# Patient Record
Sex: MALE | Race: WHITE | NOT HISPANIC OR LATINO | Employment: FULL TIME | URBAN - METROPOLITAN AREA
[De-identification: names, ages, dates, MRNs, and addresses within clinical notes are randomized per-mention and may not be internally consistent; named-entity substitution may affect disease eponyms.]

---

## 2018-11-06 ENCOUNTER — TELEPHONE (OUTPATIENT)
Dept: PEDIATRICS | Facility: CLINIC | Age: 18
End: 2018-11-06

## 2018-11-06 NOTE — TELEPHONE ENCOUNTER
11/6/2018    Call Regarding ReattributionPhysical    Attempt 1    Message on voicemail     Comments:       Outreach   ZIGGY

## 2018-12-23 ENCOUNTER — OFFICE VISIT (OUTPATIENT)
Dept: URGENT CARE | Facility: URGENT CARE | Age: 18
End: 2018-12-23
Payer: COMMERCIAL

## 2018-12-23 VITALS
BODY MASS INDEX: 32.55 KG/M2 | HEART RATE: 126 BPM | WEIGHT: 240 LBS | SYSTOLIC BLOOD PRESSURE: 135 MMHG | DIASTOLIC BLOOD PRESSURE: 89 MMHG | OXYGEN SATURATION: 98 % | TEMPERATURE: 99 F

## 2018-12-23 DIAGNOSIS — T78.2XXA ACUTE ANAPHYLAXIS, INITIAL ENCOUNTER: Primary | ICD-10-CM

## 2018-12-23 PROCEDURE — 96372 THER/PROPH/DIAG INJ SC/IM: CPT | Performed by: INTERNAL MEDICINE

## 2018-12-23 PROCEDURE — 99213 OFFICE O/P EST LOW 20 MIN: CPT | Performed by: INTERNAL MEDICINE

## 2018-12-23 RX ORDER — EPINEPHRINE 1 MG/ML
0.3 INJECTION, SOLUTION, CONCENTRATE INTRAVENOUS ONCE
Status: COMPLETED | OUTPATIENT
Start: 2018-12-23 | End: 2018-12-23

## 2018-12-23 RX ORDER — DIPHENHYDRAMINE HYDROCHLORIDE 50 MG/ML
50 INJECTION INTRAMUSCULAR; INTRAVENOUS ONCE
Status: COMPLETED | OUTPATIENT
Start: 2018-12-23 | End: 2018-12-23

## 2018-12-23 RX ADMIN — EPINEPHRINE 0.3 MG: 1 INJECTION, SOLUTION, CONCENTRATE INTRAVENOUS at 12:18

## 2018-12-23 RX ADMIN — DIPHENHYDRAMINE HYDROCHLORIDE 50 MG: 50 INJECTION INTRAMUSCULAR; INTRAVENOUS at 12:20

## 2018-12-23 NOTE — PROGRESS NOTES
SUBJECTIVE:  Jamari Cota, a 18 year old male, presents for evaluation of difficulty breathing.  He has a history of dog allergy. This started just this AM.  He describes a feeling of a ball in the throat, making it difficult to breath and swallow.  States that he has been exposed to dogs and cats today.  States that the only thing he had to eat or drink this AM was coffee.  Having numbness in the mouth.    OBJECTIVE:  /89   Pulse 126   Temp 99  F (37.2  C) (Tympanic)   Wt 108.9 kg (240 lb)   SpO2 98%   BMI 32.55 kg/m    GEN: adult male, not able to speak full sentences, shaking  HEENT: edema of the palate and uvula  LUNG: breath sounds are present throughout but diminished; no clear stridor or wheeze  CARDIAC: regular tachycardia, normal S1/S2, no murmur or rub    CLINIC INTERVENTIONS: epinephrine 0.3 mg and diphenhydramine 50 mg given intramuscular.  911 called for emergent ambulance transport to ER.    ASSESSMENT/PLAN:    ICD-10-CM    1. Acute anaphylaxis, initial encounter T78.2XXA EPINEPHrine PF (ADRENALIN) injection 0.3 mg     diphenhydrAMINE (BENADRYL) injection 50 mg       Zeferino Garcia MD

## 2019-04-09 ENCOUNTER — TELEPHONE (OUTPATIENT)
Dept: PEDIATRICS | Facility: CLINIC | Age: 19
End: 2019-04-09

## 2019-04-09 NOTE — TELEPHONE ENCOUNTER
4/9/2019    Call Regarding ReattributionPhysical       Attempt 1    Message on voicemail    Comments:       Outreach   WYATT

## 2019-12-16 ENCOUNTER — HEALTH MAINTENANCE LETTER (OUTPATIENT)
Age: 19
End: 2019-12-16

## 2020-07-08 ENCOUNTER — VIRTUAL VISIT (OUTPATIENT)
Dept: INTERNAL MEDICINE | Facility: CLINIC | Age: 20
End: 2020-07-08
Payer: COMMERCIAL

## 2020-07-08 VITALS — WEIGHT: 230 LBS | BODY MASS INDEX: 31.19 KG/M2

## 2020-07-08 DIAGNOSIS — R05.9 COUGH: Primary | ICD-10-CM

## 2020-07-08 PROCEDURE — 99213 OFFICE O/P EST LOW 20 MIN: CPT | Mod: 95 | Performed by: INTERNAL MEDICINE

## 2020-07-08 RX ORDER — LEVOCETIRIZINE DIHYDROCHLORIDE 5 MG/1
5 TABLET, FILM COATED ORAL
COMMUNITY
End: 2021-10-01

## 2020-07-08 NOTE — PATIENT INSTRUCTIONS
COVID PCR testing ordered - you will be contacted to schedule this.    You should self isolate for at least 7 days from the start of his symptoms; you may discontinue self isolation after 7 days provided you have been fever free for 3 days (without medication) and your respiratory symptoms improved (resolution not necessary).

## 2020-07-08 NOTE — PROGRESS NOTES
"Jamari Cota is a 20 year old male who is being evaluated via a billable video visit.      The patient has been notified of following:     \"This video visit will be conducted via a call between you and your physician/provider. We have found that certain health care needs can be provided without the need for an in-person physical exam.  This service lets us provide the care you need with a video conversation.  If a prescription is necessary we can send it directly to your pharmacy.  If lab work is needed we can place an order for that and you can then stop by our lab to have the test done at a later time.    Video visits are billed at different rates depending on your insurance coverage.  Please reach out to your insurance provider with any questions.    If during the course of the call the physician/provider feels a video visit is not appropriate, you will not be charged for this service.\"    Patient has given verbal consent for Video visit? Yes    How would you like to obtain your AVS? Greg     Patient would like th video invitation sent by: Send to e-mail at: [email or web address]     Will anyone else be joining your video visit? No     VIDEO VISIT                                                      SUBJECTIVE:                                                      HPI: Jamari Cota is a pleasant 20 year old male who requested a video visit to discuss new symptoms:    Symptoms include headache, dry cough, nausea, and fatigue. They have been ongoing for the last 2 days. No known COVID-19 exposures, but COVID-19 is considered widespread at this time and can be asymptomatic.    No fevers or chills. No runny nose or sore throat. No shortness of breath or chest tightness. No diarrhea. No loss of bowel or taste.    No active medical problems.    OBJECTIVE:                                                      General: appears well    ASSESSMENT/PLAN:                                                      (R05) Cough  " (primary encounter diagnosis)  Comment: suspect symptoms represent COVID-19 infection.  Plan: COVID PCR testing ordered - patient be contacted to schedule; patient should self isolate for at least 7 days from the start of his symptoms; patient may discontinue self isolation after 7 days provided he has been fever free for 3 days and his respiratory symptoms improved (resolution not necessary).    Total time of video call between patient and provider was 8 minutes (11:37-11:45am).    (Chart documentation was completed, in part, with [employer] voice-recognition software. Even though reviewed, some grammatical, spelling, and word errors may remain.)    Destiny Prather MD   [address] [address]  T: [phone], F: [phone]

## 2020-07-08 NOTE — LETTER
07/08/20      Jamari Cota  2000  [address]  [facility] [address]        To whom it may concern,    Please excuse Mr. Cota from starting today. He will be needing time to rest and recuperate from an illness that I am seeing him for. He may return to work in 1 week provided he has been fever free x 3 days (without medication) and his respiratory symptoms have improved (though they do not need to resolve prior to him returning to work).     Please contact me with any question or concerns.        Destiny Prather MD   [place] [address] [place] [address]  T: [phone], F: [phone]

## 2021-01-15 ENCOUNTER — HEALTH MAINTENANCE LETTER (OUTPATIENT)
Age: 21
End: 2021-01-15

## 2021-01-16 ENCOUNTER — OFFICE VISIT (OUTPATIENT)
Dept: URGENT CARE | Facility: URGENT CARE | Age: 21
End: 2021-01-16
Payer: COMMERCIAL

## 2021-01-16 VITALS
DIASTOLIC BLOOD PRESSURE: 94 MMHG | HEIGHT: 72 IN | SYSTOLIC BLOOD PRESSURE: 145 MMHG | BODY MASS INDEX: 31.69 KG/M2 | WEIGHT: 234 LBS | HEART RATE: 104 BPM | OXYGEN SATURATION: 98 % | TEMPERATURE: 97.5 F

## 2021-01-16 DIAGNOSIS — L29.9 ITCHING: ICD-10-CM

## 2021-01-16 DIAGNOSIS — L50.9 HIVES: Primary | ICD-10-CM

## 2021-01-16 PROCEDURE — 96372 THER/PROPH/DIAG INJ SC/IM: CPT | Performed by: PHYSICIAN ASSISTANT

## 2021-01-16 PROCEDURE — 99214 OFFICE O/P EST MOD 30 MIN: CPT | Mod: 25 | Performed by: PHYSICIAN ASSISTANT

## 2021-01-16 RX ORDER — METHYLPREDNISOLONE SOD SUCC 125 MG
125 VIAL (EA) INJECTION ONCE
Status: COMPLETED | OUTPATIENT
Start: 2021-01-16 | End: 2021-01-16

## 2021-01-16 RX ORDER — CETIRIZINE HYDROCHLORIDE 10 MG/1
10 TABLET ORAL DAILY
Qty: 20 TABLET | Refills: 0 | Status: SHIPPED | OUTPATIENT
Start: 2021-01-16 | End: 2021-01-16

## 2021-01-16 RX ORDER — PREDNISONE 20 MG/1
20 TABLET ORAL DAILY
Qty: 5 TABLET | Refills: 0 | Status: SHIPPED | OUTPATIENT
Start: 2021-01-16 | End: 2021-03-24

## 2021-01-16 RX ADMIN — Medication 125 MG: at 13:55

## 2021-01-16 ASSESSMENT — MIFFLIN-ST. JEOR: SCORE: 2099.42

## 2021-01-16 NOTE — PATIENT INSTRUCTIONS
Patient Education     Hives (Adult)  Hives are pink or red bumps on the skin. These bumps are also known as wheals. The bumps can itch, burn, or sting. Hives can occur anywhere on the body. They vary in size and shape and can form in clusters. Individual hives can appear and go away quickly. New hives may develop as old ones fade. Hives are common and usually harmless. They are not contagious. Occasionally, hives are a sign of a serious allergy.  Hives are often caused by an allergic reaction. They may occur from:    Certain foods, such as shellfish, nuts, tomatoes, or berries    Contact with something in the environment, such as pollens, animals, or mold    Certain medicines    Sun or cold air    Viral infections, such as a cold, the flu, or strep throat  If the hives continue to come and go over many weeks without any other symptoms (chronic hives), the cause may be very hard to figure out.  You may be prescribed medicines to ease swelling and itching. Follow all instructions when using these medicines. The hives will usually fade in a few days. But they can last for weeks or months.  Home care  Follow these tips:    Try to find the cause of the hives and eliminate it. Discuss possible causes with your healthcare provider. Your healthcare provider may ask you to keep track of the food you eat and your lifestyle to help find the cause of the hives.    Don t scratch the hives. Scratching will delay healing. To reduce itching, apply cool, wet compresses to the skin.    Dress in soft, loose cotton clothing.    Don t bathe in hot water. This can make the itching worse.    Apply an ice pack or cool pack wrapped in a thin towel to your skin. This will help reduce redness and itching. But if your hives were caused by exposure to cold, then do not apply more cold to them.    You may use over-the counter antihistamines to reduce itching. Some older antihistamines, such as diphenhydramine and chlorpheniramine, are  inexpensive. But they need to be taken often and may make you sleepy. They are best used at bedtime. Don t use diphenhydramine if you have glaucoma or have trouble urinating because of an enlarged prostate. Newer antihistamines, such as loratadine, cetirizine, levocetirizine, and fexofenadine, are generally more expensive. But they tend to have fewer side effects. They can be taken less often.    Another type of antihistamine is used to treat heartburn. This type includes ranitidine, nizatidine, famotidine, and cimetidine. These are sometimes used along with the above antihistamines if a single medicine is not working.    If the hives are severe and you do not respond well to other medicines, you may be given a steroid, such as prednisone, to take for a short time. Follow all instructions carefully when taking this medicine. Tell your healthcare provider about any side effects.  Follow-up care  Follow up with your healthcare provider if your symptoms don't get better in 2 days. Ask your provider about allergy testing if you have had a severe reaction or have had several episodes of hives. Allergy testing may help figure out what you are allergic to. You may need blood tests, a urine test, or skin tests.  When to seek medical advice  Call your healthcare provider right away if any of these occur:    Fever of 100.4 F (38.0 C) or higher, or as directed by your healthcare provider    Redness, swelling, or pain    Foul-smelling fluid coming from the rash  Call 911  Call 911 if any of the following occur:    Swelling of the face, throat, or tongue    Trouble breathing or swallowing    Dizziness, weakness, or fainting  [employer] last reviewed this educational content on 6/1/2019 2000-2020 [employer]. [address] [place] [address], [place] [address]. All rights reserved. This information is not intended as a substitute for professional medical care. Always follow your healthcare professional's instructions.

## 2021-01-16 NOTE — PROGRESS NOTES
CHIEF COMPLAINT:   Chief Complaint   Patient presents with     Urgent Care     Pt in clinic c/o severe body itching.     Derm Problem       HPI: Jamari Cota is a 20 year old male who presents to clinic today for evaluation of itching. Patient reports that symptoms started approximately one month ago. He has had intermittent episodes since that time. He usually treats his symptoms with lotions, walking outside etc.   No new medications or products. He denies having fever, chills, tongue / lip swelling, difficulty breathing, wheezing or syncope.     Past Medical History:   Diagnosis Date     Otitis media with effusion     chronic     Varicella     about age 3     Past Surgical History:   Procedure Laterality Date     PE TUBES       SURGICAL HISTORY OF -       arm surgery     Social History     Tobacco Use     Smoking status: Never Smoker     Smokeless tobacco: Never Used     Tobacco comment: non smoking home, mother and step father smoke   Substance Use Topics     Alcohol use: No     Alcohol/week: 0.0 standard drinks     Current Outpatient Medications   Medication     fluticasone (FLONASE) 50 MCG/ACT nasal spray     levocetirizine (XYZAL) 5 MG tablet     Current Facility-Administered Medications   Medication     methylPREDNISolone sodium succinate (solu-MEDROL) injection 125 mg     Allergies   Allergen Reactions     Codeine      Hyper       10 point ROS of systems including Constitutional, Eyes, Respiratory, Cardiovascular, Gastroenterology, Genitourinary, Integumentary, Muscularskeletal, Psychiatric were all negative except for pertinent positives noted in my HPI.        Exam:  BP (!) 145/94   Pulse 104   Temp 97.5  F (36.4  C) (Oral)   Ht 1.829 m (6')   Wt 106.1 kg (234 lb)   SpO2 98%   BMI 31.74 kg/m    Constitutional: healthy, alert and no distress  Head: Normocephalic, atraumatic.  Eyes: conjunctiva clear, no drainage  ENT: TMs clear and shiny marisela, nasal mucosa pink and moist, throat without tonsillar  hypertrophy or erythema. No tongue or lip swelling. No trismus or drooling.   Neck: neck is supple, no cervical lymphadenopathy or nuchal rigidity  Cardiovascular: RRR  Respiratory: CTA bilaterally, no rhonchi or rales  Gastrointestinal: soft and nontender  Skin: multiple pleomorphic, raised, well-defined, blanching patches with wheals and flares.  Neurologic: Speech clear, gait normal. Moves all extremities.      ASSESSMENT/PLAN:  1. Hives  - methylPREDNISolone sodium succinate (solu-MEDROL) injection 125 mg  - predniSONE (DELTASONE) 20 MG tablet; Take 1 tablet (20 mg) by mouth daily  Dispense: 5 tablet; Refill: 0    2. Itching  - predniSONE (DELTASONE) 20 MG tablet; Take 1 tablet (20 mg) by mouth daily  Dispense: 5 tablet; Refill: 0      20 year old male presents to the clinic for evaluation of rash. He continues to have recurring episodes. Rash in appearance is consistent with hives. No evidence of anaphyalxis or severe systemic reaction. He was quite uncomfortable when he arrived in the clinic, and was given 125mg of solumedrol which greatly improved his symptoms.   Will have him take prednisone at home and continue with Xyzal.   Follow-up with PCP if symptoms fail to resolve or continue to recur.     Diagnosis and treatment plan was reviewed with patient and/or family.   We went over any labs or imaging. Discussed worsening symptoms or little to no relief despite treatment plan to follow-up with PCP   Patient verbalizes understanding. All questions were addressed and answered.   Janell Howard PA-C

## 2021-03-24 ENCOUNTER — OFFICE VISIT (OUTPATIENT)
Dept: INTERNAL MEDICINE | Facility: CLINIC | Age: 21
End: 2021-03-24
Payer: COMMERCIAL

## 2021-03-24 VITALS
OXYGEN SATURATION: 98 % | WEIGHT: 265.3 LBS | SYSTOLIC BLOOD PRESSURE: 122 MMHG | HEART RATE: 95 BPM | TEMPERATURE: 97.6 F | BODY MASS INDEX: 35.93 KG/M2 | HEIGHT: 72 IN | DIASTOLIC BLOOD PRESSURE: 88 MMHG

## 2021-03-24 DIAGNOSIS — L50.9 URTICARIA: Primary | ICD-10-CM

## 2021-03-24 DIAGNOSIS — J30.2 SEASONAL ALLERGIC RHINITIS, UNSPECIFIED TRIGGER: ICD-10-CM

## 2021-03-24 PROCEDURE — 99213 OFFICE O/P EST LOW 20 MIN: CPT | Performed by: INTERNAL MEDICINE

## 2021-03-24 RX ORDER — HYDROXYZINE HYDROCHLORIDE 25 MG/1
25 TABLET, FILM COATED ORAL 3 TIMES DAILY PRN
Qty: 90 TABLET | Refills: 0 | Status: SHIPPED | OUTPATIENT
Start: 2021-03-24 | End: 2021-06-19

## 2021-03-24 RX ORDER — MONTELUKAST SODIUM 10 MG/1
10 TABLET ORAL AT BEDTIME
Qty: 90 TABLET | Refills: 0 | Status: SHIPPED | OUTPATIENT
Start: 2021-03-24 | End: 2021-06-19

## 2021-03-24 RX ORDER — FAMOTIDINE 40 MG/1
40 TABLET, FILM COATED ORAL DAILY
Qty: 90 TABLET | Refills: 0 | Status: SHIPPED | OUTPATIENT
Start: 2021-03-24 | End: 2021-06-19

## 2021-03-24 ASSESSMENT — MIFFLIN-ST. JEOR: SCORE: 2236.39

## 2021-03-24 NOTE — PROGRESS NOTES
Assessment & Plan     (L50.9) Urticaria  (primary encounter diagnosis)  Comment:   Urticaria from unknown source.   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 He does not pretreat his living [place] backyard.  No evidence for respiratory compromise at this time.  The symptoms are not bad enough to warrant oral steroids.   Recommended the patient take over the counter non-sedating antihistamines indefinitely.  Will have the patient continue to check to see if there are any new features in their life that may have caused this and eliminate any items that might have caused this (being sure to let us know before they stop any medications).   Due to the extensive and persistent nature of his symptoms, return to allergy clinic for further evaluation. He may need allergy injection therapy.     Add montelukast once daily until further notice.  Add additional histamine blocking, famotidine 40 mg once daily.  Plan: ALLERGY/ASTHMA ADULT REFERRAL, montelukast         (SINGULAIR) 10 MG tablet, hydrOXYzine (ATARAX)         25 MG tablet, famotidine (PEPCID) 40 MG tablet            (J30.2) Seasonal allergic rhinitis, unspecified trigger  Comment: Reviewed seasonal allergies/ environmental allergies/allergic and/or chronic rhintis with the pt. and available treatment options.  Pt understands that the insurance companies have lately desiring to see trial and failure of claritin OTC before covering prescription.  Also disucssed the role of steroid nasal sprays in these types of situations. Discussed the concept of avoidance measures and taking an active role in modifying whatever can be changed in preventing exposures to knwon allergens.  Also discussed the imprtance of reconsdiering ets if they are part of the problem. Also discussed the absolute need for smoking cessation if any tobacco abuse present.  Plan: montelukast (SINGULAIR) 10 MG tablet                        BMI:   Estimated body mass  index is 35.98 kg/m  as calculated from the following:    Height as of this encounter: 1.829 m (6').    Weight as of this encounter: 120.3 kg (265 lb 4.8 oz).           Return in about 3 months (around 6/24/2021) for for recheck, if the symptoms fail to improve.    Toby Beaulieu MD  [facility] MYLES Kauffman is a 21 year old who presents for the following health issues     HPI     Derm Problem  Onset/Duration: on and off x3 months   Description  Location: whole body, worse on head, shoulders and legs   Character: red, itchy and burning   Itching: moderate to severe  Intensity:  moderate  Progression of Symptoms:  same and intermittent  Accompanying signs and symptoms:   Fever: no  Body aches or joint pain: no  Sore throat symptoms: no  Recent cold symptoms: no  History:           Previous episodes of similar rash: None  New exposures:  None  Recent travel: no  Exposure to similar rash: no  Precipitating or alleviating factors:   Therapies tried and outcome: none    Ongoing issues of urticaria over the last few months. Diffuse itching and occasional hives on his upper torso legs arms head.  All of the symptoms occurring despite taking daily Xyzal for his usual environmental allergies.  Had acute episode of urticaria associated severe shortness of breath and tightness of breathing for which she received epinephrine injection at the urgent care. Seen initially in urgent care but then transported to [facility] reason observed overnight.  Seen by allergist in follow-up, allergy testing reviewed intense allergies to birch trees among other things. The allergist recommended daily antihistamine use, with plans to follow-up if he did not improve..    That was the one only episode of severe shortness of breath associated with his recent symptoms.    Many year history of seasonal allergies with congestion rhinorrhea watery eyes.  **I reviewed the information recorded in the  patient's EPIC chart (including but not limited to medical history, surgical history, family history, problem list, medication list, and allergy list) and updated the information as indicated based on the patients reported information.         Review of Systems   Constitutional, HEENT, cardiovascular, pulmonary, gi and gu systems are negative, except as otherwise noted.      Objective    There were no vitals taken for this visit.  There is no height or weight on file to calculate BMI.  Physical Exam   GENERAL alert and no distress  EYES:  Normal sclera,conjunctiva, EOMI  HENT: oral and posterior pharynx without lesions or erythema, facies symmetric  NECK: Neck supple. No LAD, without thyroidmegaly.  RESP: Clear to ausculation bilaterally without wheezes or crackles. Normal BS in all fields.  CV: RRR normal S1S2 without murmurs, rubs or gallops.  LYMPH: no cervical lymph adenopathy appreciated  MS: extremities- no gross deformities of the visible extremities noted,   EXT:  no lower extremity edema  PSYCH: Alert and oriented times 3; speech- coherent  SKIN:  No obvious significant skin lesions on visible portions of face

## 2021-03-24 NOTE — PATIENT INSTRUCTIONS
"*  Urticaria or \"hives\" is a systemic allergic reaction that needs to be \"treated from the inside\".  Avoid any known triggers (if you can find out what they might have been.  Sometime you never find out what triggered the reaction.      *  Take over the counter allergy tablet every day.   Take either Xyzal (levocetirizine), or generic Allegra (fexofenadine), generic Zyrtec (Cetirizine), or generic Claritin (Loratadine) once per day for the next 3-4 weeks.  Sometimes the lingering allergy immune complexes can linger in the body for 2-4 weeks after the exposure.  Insurances do not cover over the counter medications.     *  MONTELUKAST (GENERIC SINGULAIR):  Take Montelukast (generic Singulair) 10 mg once per day every day during your main allergy and/or asthma season(s).  This medication will help reduce the inflammation inside your airways and thereby help reduce your asthma and allergy season.  It is not a \"rescue medication\" and will not get you out of acute troubles, always use your rescue albuterol inhaler if you develop any acute breathing troubles.       *  Also take Famotidine (another histamine blocker)    *  Hydroxyzine 25 or 50 mg 2-4 times per day as needed for itching relief, perhaps more in the evening or bedtime.    Beware of drowsiness after taking this medication, do not drive, use dangerous machinery, or perform dangerous tasks after taking this.      *  Allergy consultation:    --[facility] [phone]   [email or web address]    --[facility] [phone]   [email or web address]    --[facility] [phone]   [email or web address]          SEASONAL ALLERGIES:     *  Take one of the following over the counter non-sedating anti-histamines allergy tablet once per day, every day for the next 4-8 weeks during the duration of the allergy season.      --generic Allegra " "(fexofenidine)  OR  --generic Claritin (loratidine)     OR  --generic Zyrtec (cetirizine)      *  IF YOU HAVE A LOT OF EYE IRRITATION FROM ALLERGIES:Use allergy eye drops IF NEEDED for allergic conjunctivitis     --Patanol (or similar over the counter product):  1 drop into the affected eye twice per day as needed during the main allergy season(s)     --if this prescription eye drop is not covered by your insurance, then have the pharmacist direct you to a similar over the counter version.          *  IF YOU HAVE A LOT OF NASAL OR SINUS CONGESTION:  Use steroid nasal spray (available over the counter), Use 2 sprays into each nostril once per day, every day regardless of how you feel for the next 4-8 weeks, depending on the length of the allergy season.  This type of steroid nasal spray will take at least 10 days to reach full effect, please use it for at least 3 full weeks before deciding if it doesn't work for you.       --typical brands include Flonase (fluticasone) or Nasonex (mometasone) or Nasocort (triamcinolone)    Examples of steroid nasal spray:  (cheapest prices are from [employer] or [employer])             * Beware of decongestants or medications preparations that have a \"D\", these contain pseudoephedrine or phenylephrine which may raise your blood pressure. If your blood pressure is well controlled and you are not on multiple medications, then you may be able to take small amounts of decongestant without a large chance of side effects, but please monitor your blood pressure and if >140/90 while on the decongestants, then stop those products.     *  If you cannot tolerate decongestants or have been told not to take decongestants, you can use chlortrimeton (chlorpheniramine) if you react poorly to decongestants.  Always try and use the lowest dose needed.  If you have a low of overnight or early morning allergy symptoms, then try taking you favorite nighttime multi symptom cold reliever medication (such as " Nyquil, Vicks Formula 44, etc.)

## 2021-05-04 ENCOUNTER — IMMUNIZATION (OUTPATIENT)
Dept: NURSING | Facility: CLINIC | Age: 21
End: 2021-05-04
Payer: COMMERCIAL

## 2021-05-04 PROCEDURE — 91300 PR COVID VAC PFIZER DIL RECON 30 MCG/0.3 ML IM: CPT

## 2021-05-04 PROCEDURE — 0001A PR COVID VAC PFIZER DIL RECON 30 MCG/0.3 ML IM: CPT

## 2021-05-25 ENCOUNTER — IMMUNIZATION (OUTPATIENT)
Dept: NURSING | Facility: CLINIC | Age: 21
End: 2021-05-25
Attending: INTERNAL MEDICINE
Payer: COMMERCIAL

## 2021-05-25 PROCEDURE — 91300 PR COVID VAC PFIZER DIL RECON 30 MCG/0.3 ML IM: CPT

## 2021-05-25 PROCEDURE — 0002A PR COVID VAC PFIZER DIL RECON 30 MCG/0.3 ML IM: CPT

## 2021-06-19 ENCOUNTER — MYC REFILL (OUTPATIENT)
Dept: INTERNAL MEDICINE | Facility: CLINIC | Age: 21
End: 2021-06-19

## 2021-06-19 DIAGNOSIS — L50.9 URTICARIA: ICD-10-CM

## 2021-06-19 DIAGNOSIS — J30.2 SEASONAL ALLERGIC RHINITIS, UNSPECIFIED TRIGGER: ICD-10-CM

## 2021-06-21 RX ORDER — HYDROXYZINE HYDROCHLORIDE 25 MG/1
25 TABLET, FILM COATED ORAL 3 TIMES DAILY PRN
Qty: 90 TABLET | Refills: 2 | Status: SHIPPED | OUTPATIENT
Start: 2021-06-21

## 2021-06-21 RX ORDER — FAMOTIDINE 40 MG/1
40 TABLET, FILM COATED ORAL DAILY
Qty: 90 TABLET | Refills: 2 | Status: SHIPPED | OUTPATIENT
Start: 2021-06-21 | End: 2021-10-01

## 2021-06-21 RX ORDER — MONTELUKAST SODIUM 10 MG/1
10 TABLET ORAL AT BEDTIME
Qty: 90 TABLET | Refills: 2 | Status: SHIPPED | OUTPATIENT
Start: 2021-06-21 | End: 2021-10-01

## 2021-09-04 ENCOUNTER — HEALTH MAINTENANCE LETTER (OUTPATIENT)
Age: 21
End: 2021-09-04

## 2021-09-28 ENCOUNTER — HOSPITAL ENCOUNTER (EMERGENCY)
Facility: CLINIC | Age: 21
Discharge: HOME | End: 2021-09-28
Attending: EMERGENCY MEDICINE | Admitting: EMERGENCY MEDICINE
Payer: COMMERCIAL

## 2021-09-28 ENCOUNTER — APPOINTMENT (OUTPATIENT)
Dept: CT IMAGING | Facility: CLINIC | Age: 21
End: 2021-09-28
Attending: EMERGENCY MEDICINE
Payer: COMMERCIAL

## 2021-09-28 VITALS
SYSTOLIC BLOOD PRESSURE: 127 MMHG | TEMPERATURE: 98 F | DIASTOLIC BLOOD PRESSURE: 93 MMHG | RESPIRATION RATE: 16 BRPM | HEIGHT: 72 IN | WEIGHT: 270 LBS | HEART RATE: 89 BPM | OXYGEN SATURATION: 98 % | BODY MASS INDEX: 36.57 KG/M2

## 2021-09-28 DIAGNOSIS — R79.89 ELEVATED LIVER FUNCTION TESTS: ICD-10-CM

## 2021-09-28 DIAGNOSIS — R10.9 ABDOMINAL WALL PAIN: ICD-10-CM

## 2021-09-28 LAB
ALBUMIN SERPL-MCNC: 4.7 G/DL (ref 3.4–5)
ALP SERPL-CCNC: 67 U/L (ref 40–150)
ALT SERPL W P-5'-P-CCNC: 236 U/L (ref 0–70)
ANION GAP SERPL CALCULATED.3IONS-SCNC: 8 MMOL/L (ref 3–14)
AST SERPL W P-5'-P-CCNC: 78 U/L (ref 0–45)
BASOPHILS # BLD AUTO: 0.1 10E3/UL (ref 0–0.2)
BASOPHILS NFR BLD AUTO: 1 %
BILIRUB SERPL-MCNC: 1.9 MG/DL (ref 0.2–1.3)
BUN SERPL-MCNC: 15 MG/DL (ref 7–30)
CALCIUM SERPL-MCNC: 9.2 MG/DL (ref 8.5–10.1)
CHLORIDE BLD-SCNC: 104 MMOL/L (ref 94–109)
CO2 SERPL-SCNC: 25 MMOL/L (ref 20–32)
CREAT SERPL-MCNC: 1.1 MG/DL (ref 0.66–1.25)
EOSINOPHIL # BLD AUTO: 0.2 10E3/UL (ref 0–0.7)
EOSINOPHIL NFR BLD AUTO: 2 %
ERYTHROCYTE [DISTWIDTH] IN BLOOD BY AUTOMATED COUNT: 12.1 % (ref 10–15)
GFR SERPL CREATININE-BSD FRML MDRD: >90 ML/MIN/1.73M2
GLUCOSE BLD-MCNC: 91 MG/DL (ref 70–99)
HCT VFR BLD AUTO: 51 % (ref 40–53)
HGB BLD-MCNC: 17.2 G/DL (ref 13.3–17.7)
HOLD SPECIMEN: NORMAL
IMM GRANULOCYTES # BLD: 0 10E3/UL
IMM GRANULOCYTES NFR BLD: 0 %
LIPASE SERPL-CCNC: 158 U/L (ref 73–393)
LYMPHOCYTES # BLD AUTO: 3.5 10E3/UL (ref 0.8–5.3)
LYMPHOCYTES NFR BLD AUTO: 41 %
MCH RBC QN AUTO: 28.4 PG (ref 26.5–33)
MCHC RBC AUTO-ENTMCNC: 33.7 G/DL (ref 31.5–36.5)
MCV RBC AUTO: 84 FL (ref 78–100)
MONOCYTES # BLD AUTO: 0.8 10E3/UL (ref 0–1.3)
MONOCYTES NFR BLD AUTO: 9 %
NEUTROPHILS # BLD AUTO: 4 10E3/UL (ref 1.6–8.3)
NEUTROPHILS NFR BLD AUTO: 47 %
NRBC # BLD AUTO: 0 10E3/UL
NRBC BLD AUTO-RTO: 0 /100
PLATELET # BLD AUTO: 314 10E3/UL (ref 150–450)
POTASSIUM BLD-SCNC: 3.5 MMOL/L (ref 3.4–5.3)
PROT SERPL-MCNC: 8.5 G/DL (ref 6.8–8.8)
RBC # BLD AUTO: 6.05 10E6/UL (ref 4.4–5.9)
SODIUM SERPL-SCNC: 137 MMOL/L (ref 133–144)
WBC # BLD AUTO: 8.6 10E3/UL (ref 4–11)

## 2021-09-28 PROCEDURE — 83690 ASSAY OF LIPASE: CPT | Performed by: EMERGENCY MEDICINE

## 2021-09-28 PROCEDURE — 99285 EMERGENCY DEPT VISIT HI MDM: CPT | Mod: 25

## 2021-09-28 PROCEDURE — 85025 COMPLETE CBC W/AUTO DIFF WBC: CPT | Performed by: EMERGENCY MEDICINE

## 2021-09-28 PROCEDURE — 250N000011 HC RX IP 250 OP 636: Performed by: EMERGENCY MEDICINE

## 2021-09-28 PROCEDURE — 80053 COMPREHEN METABOLIC PANEL: CPT | Performed by: EMERGENCY MEDICINE

## 2021-09-28 PROCEDURE — 250N000009 HC RX 250: Performed by: EMERGENCY MEDICINE

## 2021-09-28 PROCEDURE — 36415 COLL VENOUS BLD VENIPUNCTURE: CPT | Performed by: EMERGENCY MEDICINE

## 2021-09-28 PROCEDURE — 74177 CT ABD & PELVIS W/CONTRAST: CPT

## 2021-09-28 RX ORDER — IOPAMIDOL 755 MG/ML
135 INJECTION, SOLUTION INTRAVASCULAR ONCE
Status: COMPLETED | OUTPATIENT
Start: 2021-09-28 | End: 2021-09-28

## 2021-09-28 RX ADMIN — SODIUM CHLORIDE 79 ML: 900 INJECTION INTRAVENOUS at 21:26

## 2021-09-28 RX ADMIN — IOPAMIDOL 135 ML: 755 INJECTION, SOLUTION INTRAVENOUS at 21:26

## 2021-09-28 ASSESSMENT — MIFFLIN-ST. JEOR: SCORE: 2257.71

## 2021-09-28 ASSESSMENT — ENCOUNTER SYMPTOMS
VOMITING: 0
SORE THROAT: 0
ABDOMINAL PAIN: 1
COUGH: 0
FEVER: 0

## 2021-09-29 NOTE — ED PROVIDER NOTES
History   Chief Complaint:  Abdominal Pain     The history is provided by the patient.      Jamari Cota is a 21 year old male with history of obesity and allergic rhinitis who presents with abdominal pain. The patient reports that he received blood work for an allergy test back in August with his PCP that revealed elevated liver function. The allergy is thought to be environmental based on testing. He had blood work again in September that still showed elevated liver function. He has an ultrasound scheduled for tomorrow at [facility] but was told by his PCP to come to the ED due to the abdominal pain. It began today and is localized in his upper left abdomen. The pain is worse when sitting rather than standing, and it worsens with touch. He denies any recent injury or trauma to the area. He does note that he has started to jog recently. He has been having normal bowel movements and no drastic diet changes. He denies any fever, cough, sore throat, or vomiting.  Of note he does take CBD and supplements.  He has recently cut out alcohol.    Lab results from [facility] visit on 08/17/2021:  CMP: AST 71(H), (H), o/w WNL (Creatinine: 1.00)    Lab results from [facility] visit on 09/15/2021:  CMP: AST 69(H), (H), Bilirubin 1.8(H), o/w WNL (Creatinine: 1.00)    Review of Systems   Constitutional: Negative for fever.   HENT: Negative for sore throat.    Respiratory: Negative for cough.    Gastrointestinal: Positive for abdominal pain. Negative for vomiting.   All other systems reviewed and are negative.        Allergies:  Codeine    Medications:  Pepcid  Singulair  Atarax    Past Medical History:    Allergic rhinitis  Depression  Obesity  Sepsis      Past Surgical History:    PE tube placement   Arm surgery  [place] teeth extraction    Family History:    Allergic rhinitis, father    Social History:  Presents to the ED alone.    Physical Exam     Patient Vitals for  the past 24 hrs:   BP Temp Temp src Pulse Resp SpO2 Height Weight   09/28/21 1953 (!) 127/93 98  F (36.7  C) Oral 89 16 98 % -- --   09/28/21 1658 (!) 155/87 98.8  F (37.1  C) Temporal 110 18 99 % 1.829 m (6') 122.5 kg (270 lb)       Physical Exam  Physical Exam   Constitutional:  Patient is oriented to person, place, and time. They appear well-developed and well-nourished. Mild distress secondary to abdominal pain.   HENT:   Mouth/Throat:   Oropharynx is clear and moist.   Eyes:    Conjunctivae normal and EOM are normal. Pupils are equal, round, and reactive to light.   Neck:    Normal range of motion.   Cardiovascular: Normal rate, regular rhythm and normal heart sounds.  Exam reveals no gallop and no friction rub.  No murmur heard.  Pulmonary/Chest:  Effort normal and breath sounds normal. Patient has no wheezes. Patient has no rales. No pleuritic pain.  Abdominal:   Soft. Bowel sounds are normal. Patient exhibits no mass. There is tenderness to palpation on the left upper abdomen.  There is no rebound and no guarding.   Musculoskeletal:  Normal range of motion. Patient exhibits no edema.   Neurological:   Patient is alert and oriented to person, place, and time. Patient has normal strength. No cranial nerve deficit or sensory deficit. GCS 15  Skin:   Skin is warm and dry. No rash noted. No erythema.   Psychiatric:   Patient has a normal mood and affect. Patient's behavior is normal. Judgment and thought content normal.     Emergency Department Course     Imaging:  CT Abdomen/Pelvis w contrast:   1.  There is marked diffuse fatty infiltration of the liver.   2.  No other cause for abdominal pain is identified.      Reading per radiology    Laboratory:  CBC: WBC 8.6, HGB 17.2,    CMP: AST 78(H), (H), Bilirubin 1.9(H), o/w WNL (Creatinine: 1.10)    Lipase: 158    Emergency Department Course:  Reviewed:  I reviewed the patient's nursing notes, vitals, past medical records, Care Everywhere.      Assessments:  2058 I performed an assessment and examination of the patient as noted above.      2219 Findings and plan explained to the Patient. Patient discharged home with instructions regarding supportive care, medications, and reasons to return. The importance of close follow-up was reviewed.     Disposition:  The patient was discharged to home.     Impression & Plan   Medical Decision Making:  Jamari Cota is a 21 year old male presenting to the emergency department with left upper quadrant abdominal pain.  No chest pain shortness of breath, cough.  He had very reproducible pain to palpation on the left side of his abdomen.  There is no bruising, redness, ecchymosis.  Basic blood work does show persistently elevated liver function.  Based on where he had his pain I elected to do a CT.  This shows fatty infiltration of the liver which he was previously aware of.  There are otherwise no acute findings.  I suspect that his discomfort based on exam is due to abdominal muscle pain.  At this time I counseled him regarding his results.  Recommended he get his liver function rechecked in a month.  Cut out CBD and other supplements as well as Tylenol products.  He will follow-up with his outpatient ultrasound and follow-up with his primary care doctor in a week.    Covid-19  Jamari Cota was evaluated during a global COVID-19 pandemic, which necessitated consideration that the patient might be at risk for infection with the SARS-CoV-2 virus that causes COVID-19.   Applicable protocols for evaluation were followed during the patient's care.     Diagnosis:    ICD-10-CM    1. Abdominal wall pain  R10.9    2. Elevated liver function tests  R79.89      Scribe Disclosure:  I, Nellie Marina, am serving as a scribe at 8:46 PM on 9/28/2021 to document services personally performed by Uma Weinstein MD based on my observations and the provider's statements to me.       Uma Weinstein MD  09/28/21  0315

## 2021-09-29 NOTE — DISCHARGE INSTRUCTIONS
Your liver function tests are elevated today but stable from your previous checks.  Avoid ibuprofen products stop using any supplements or CBD products.  Have this rechecked in 1 month.

## 2021-10-01 ENCOUNTER — OFFICE VISIT (OUTPATIENT)
Dept: INTERNAL MEDICINE | Facility: CLINIC | Age: 21
End: 2021-10-01
Payer: COMMERCIAL

## 2021-10-01 VITALS
BODY MASS INDEX: 35.25 KG/M2 | OXYGEN SATURATION: 98 % | HEART RATE: 107 BPM | RESPIRATION RATE: 16 BRPM | TEMPERATURE: 98.4 F | DIASTOLIC BLOOD PRESSURE: 84 MMHG | HEIGHT: 73 IN | SYSTOLIC BLOOD PRESSURE: 124 MMHG | WEIGHT: 266 LBS

## 2021-10-01 DIAGNOSIS — Z11.4 ENCOUNTER FOR SCREENING FOR HIV: ICD-10-CM

## 2021-10-01 DIAGNOSIS — R79.89 ELEVATED LFTS: ICD-10-CM

## 2021-10-01 DIAGNOSIS — Z11.59 NEED FOR HEPATITIS C SCREENING TEST: ICD-10-CM

## 2021-10-01 DIAGNOSIS — K76.0 FATTY LIVER: Primary | ICD-10-CM

## 2021-10-01 DIAGNOSIS — L29.9 ITCHING: ICD-10-CM

## 2021-10-01 LAB
CHOLEST SERPL-MCNC: 241 MG/DL
FASTING STATUS PATIENT QL REPORTED: YES
GGT SERPL-CCNC: 107 U/L (ref 0–75)
HAV IGG SER QL IA: REACTIVE
HBV CORE AB SERPL QL IA: NONREACTIVE
HBV SURFACE AB SERPL IA-ACNC: 0.95 M[IU]/ML
HBV SURFACE AG SERPL QL IA: NONREACTIVE
HCV AB SERPL QL IA: NONREACTIVE
HDLC SERPL-MCNC: 30 MG/DL
HIV 1+2 AB+HIV1 P24 AG SERPL QL IA: NONREACTIVE
IRON SATN MFR SERPL: 34 % (ref 15–46)
IRON SERPL-MCNC: 109 UG/DL (ref 35–180)
LDLC SERPL CALC-MCNC: 180 MG/DL
NONHDLC SERPL-MCNC: 211 MG/DL
TIBC SERPL-MCNC: 318 UG/DL (ref 240–430)
TRIGL SERPL-MCNC: 154 MG/DL

## 2021-10-01 PROCEDURE — 82977 ASSAY OF GGT: CPT | Performed by: NURSE PRACTITIONER

## 2021-10-01 PROCEDURE — 86708 HEPATITIS A ANTIBODY: CPT | Performed by: NURSE PRACTITIONER

## 2021-10-01 PROCEDURE — 86803 HEPATITIS C AB TEST: CPT | Performed by: NURSE PRACTITIONER

## 2021-10-01 PROCEDURE — 99215 OFFICE O/P EST HI 40 MIN: CPT | Performed by: NURSE PRACTITIONER

## 2021-10-01 PROCEDURE — 80061 LIPID PANEL: CPT | Performed by: NURSE PRACTITIONER

## 2021-10-01 PROCEDURE — 86704 HEP B CORE ANTIBODY TOTAL: CPT | Performed by: NURSE PRACTITIONER

## 2021-10-01 PROCEDURE — 36415 COLL VENOUS BLD VENIPUNCTURE: CPT | Performed by: NURSE PRACTITIONER

## 2021-10-01 PROCEDURE — 86038 ANTINUCLEAR ANTIBODIES: CPT | Performed by: NURSE PRACTITIONER

## 2021-10-01 PROCEDURE — 86706 HEP B SURFACE ANTIBODY: CPT | Performed by: NURSE PRACTITIONER

## 2021-10-01 PROCEDURE — 87389 HIV-1 AG W/HIV-1&-2 AB AG IA: CPT | Performed by: NURSE PRACTITIONER

## 2021-10-01 PROCEDURE — 83550 IRON BINDING TEST: CPT | Performed by: NURSE PRACTITIONER

## 2021-10-01 PROCEDURE — 87340 HEPATITIS B SURFACE AG IA: CPT | Performed by: NURSE PRACTITIONER

## 2021-10-01 PROCEDURE — 86709 HEPATITIS A IGM ANTIBODY: CPT | Performed by: NURSE PRACTITIONER

## 2021-10-01 ASSESSMENT — MIFFLIN-ST. JEOR: SCORE: 2247.51

## 2021-10-01 NOTE — PROGRESS NOTES
"    Assessment & Plan     Fatty liver  Needs further evaluation - lab today   GI referral   He is very young for fatty liver   - Lipid panel reflex to direct LDL Fasting  - Adult Gastro Ref - Consult Only  - Anti Nuclear Eliza IgG by IFA with Reflex  - Hepatitis Antibody A IgG  - Hepatitis B surface antigen  - Hepatitis B Surface Antibody  - Hepatitis B core antibody  - Iron and iron binding capacity  - GGT    Need for hepatitis C screening test    - Hepatitis C Screen Reflex to HCV RNA Quant and Genotype    Elevated LFTs  Needs further evaluation   - Lipid panel reflex to direct LDL Fasting  - Hepatitis C Screen Reflex to HCV RNA Quant and Genotype  - Adult Gastro Ref - Consult Only  - Anti Nuclear Eliza IgG by IFA with Reflex  - Hepatitis Antibody A IgG  - Hepatitis B surface antigen  - Hepatitis B Surface Antibody  - Hepatitis B core antibody  - Iron and iron binding capacity  - GGT    Encounter for screening for HIV    - HIV Antigen Antibody Combo; Future    Itching  Hydroxyzine working         40 minutes spent on the date of the encounter doing chart review, history and exam, documentation and further activities per the note       BMI:   Estimated body mass index is 35.58 kg/m  as calculated from the following:    Height as of this encounter: 1.842 m (6' 0.5\").    Weight as of this encounter: 120.7 kg (266 lb).   Weight management plan: Discussed healthy diet and exercise guidelines    Patient Instructions   Lab in [address]    Gastroenterology referral       Return in about 4 weeks (around 10/29/2021) for GI referral - follow up depending on lab  .    LUPILLO Vilchis CNP  M [facility] is a 21 year old who presents for the following health issues     HPI   Establish care   Concerns regarding fatty liver     He has been seen and had lab and imaging done which shows progressive increase in ALT, AST and bili over past couple weeks and fatty liver on ct scan     He " "denies a lot of alcohol use and currently no alcohol being taken     He has had some itching which hydroxyzine has helped with         Review of Systems   Constitutional, HEENT, cardiovascular, pulmonary, GI, , musculoskeletal, neuro, skin, endocrine and psych systems are negative, except as otherwise noted.      Objective    /84   Pulse 107   Temp 98.4  F (36.9  C) (Oral)   Resp 16   Ht 1.842 m (6' 0.5\")   Wt 120.7 kg (266 lb)   SpO2 98%   BMI 35.58 kg/m    Body mass index is 35.58 kg/m .  Physical Exam   GENERAL: alert and no distress  RESP: lungs clear to auscultation - no rales, rhonchi or wheezes  CV: regular rate and rhythm, normal S1 S2, no S3 or S4, no murmur, click or rub, no peripheral edema and peripheral pulses strong  ABDOMEN: soft, nontender, no hepatosplenomegaly, no masses and bowel sounds normal  MS: no gross musculoskeletal defects noted, no edema  NEURO: Normal strength and tone, mentation intact and speech normal  PSYCH: mentation appears normal, affect normal/bright    Lab             "

## 2021-10-05 LAB
ANA SER QL IF: NEGATIVE
HAV IGM SERPL QL IA: NONREACTIVE

## 2021-10-06 DIAGNOSIS — E78.00 HYPERCHOLESTEREMIA: Primary | ICD-10-CM

## 2021-10-06 RX ORDER — ROSUVASTATIN CALCIUM 20 MG/1
20 TABLET, COATED ORAL DAILY
Qty: 90 TABLET | Refills: 3 | Status: SHIPPED | OUTPATIENT
Start: 2021-10-06 | End: 2022-09-02

## 2021-11-08 ENCOUNTER — VIRTUAL VISIT (OUTPATIENT)
Dept: GASTROENTEROLOGY | Facility: CLINIC | Age: 21
End: 2021-11-08
Attending: NURSE PRACTITIONER
Payer: COMMERCIAL

## 2021-11-08 VITALS — BODY MASS INDEX: 36.57 KG/M2 | WEIGHT: 270 LBS | HEIGHT: 72 IN

## 2021-11-08 DIAGNOSIS — E78.2 MIXED HYPERLIPIDEMIA: Primary | ICD-10-CM

## 2021-11-08 PROCEDURE — 99204 OFFICE O/P NEW MOD 45 MIN: CPT | Mod: GT | Performed by: INTERNAL MEDICINE

## 2021-11-08 ASSESSMENT — MIFFLIN-ST. JEOR: SCORE: 2257.71

## 2021-11-08 ASSESSMENT — PAIN SCALES - GENERAL: PAINLEVEL: NO PAIN (0)

## 2021-11-08 NOTE — NURSING NOTE
Chief Complaint   Patient presents with     New Patient     Fatty Liver; Elevated LFT's       Vitals:    11/08/21 1239   Weight: 122.5 kg (270 lb)   Height: 1.829 m (6')       Body mass index is 36.62 kg/m .    Misti Mars MA

## 2021-11-08 NOTE — PROGRESS NOTES
Jamari is a 21 year old who is being evaluated via a billable video visit.      How would you like to obtain your AVS? MyChart  If the video visit is dropped, the invitation should be resent by: Text to cell phone: [phone]  Will anyone else be joining your video visit? No      Video Start Time: 1:04 PM.    [facility]    Hepatology New Patient Visit    Referring provider:  Twyla Sterling    Chief complaint: Fatty infiltration of the liver      HPI: Mr. Cota is a 21-year-old male whom we have a video visit with him for abnormal liver function tests.  In summary he carries a diagnosis of hyperlipidemia, and obesity and his primary care physician did a full work-up.  This included etiology for abnormal liver function tests and imaging.  The etiology for abnormal liver function test this did not show viral hepatitis nor autoimmune markers.  His iron studies were also normal,    Mr. Cota denies denies jaundice, abdominal distension, lower extremity edema, lethargy or confusion. No history of melena, hematemesis or hematochezia.  He did have mild pruritus which responded to antihistamines.  He denies any abdominal pain nausea vomiting and he is moving his bowels at least once a day with no blood in it. Patient denies fevers, sweats or chills. His  Weight his weight is 270 pounds and he is 6 feet tall.  As far as vaccinations for the Covid is consented he did get the Pfizer x2 doses.    Medical hx Surgical hx   Past Medical History:   Diagnosis Date     Fatty liver      Otitis media with effusion     chronic     Varicella     about age 3      Past Surgical History:   Procedure Laterality Date     PE TUBES       SURGICAL HISTORY OF -       arm surgery          Medications  Prior to Admission medications    Medication Sig Start Date End Date Taking? Authorizing Provider   hydrOXYzine (ATARAX) 25 MG tablet Take 1 tablet (25 mg) by mouth 3 times daily as needed for itching 6/21/21  Yes Smith,  Toby Motta MD   rosuvastatin (CRESTOR) 20 MG tablet Take 1 tablet (20 mg) by mouth daily 10/6/21  Yes Twyla Sterling APRN CNP       Allergies  Allergies   Allergen Reactions     Codeine      Hyper       Family hx Social hx   Family History   Problem Relation Age of Onset     Diabetes Paternal Grandmother         Type II     Diabetes Paternal Grandfather         Type II     Cancer Maternal Uncle         eye cancer     Hypertension No family hx of      Coronary Artery Disease No family hx of      Breast Cancer No family hx of      Colon Cancer No family hx of      Prostate Cancer No family hx of       Social History     Tobacco Use     Smoking status: Never Smoker     Smokeless tobacco: Never Used     Tobacco comment: non smoking home, mother and step father smoke   Vaping Use     Vaping Use: Never used   Substance Use Topics     Alcohol use: No     Alcohol/week: 0.0 standard drinks     Drug use: No          Review of systems  Mr. Cota denies any recent infections. Denies any headaches or seizures.  He denies also any cough shortness of breath or chest pain.  He has no known diabetes.  He has no thyroid disease.  He also denies any anemia or easy bruising.  He is not known to have any psychiatric problems.  He has no eye hearing or skin issues.  He denies also any musculoskeletal problems.  Otherwise comprehensive review of symptoms was noncontributory.    Examination  Ht 1.829 m (6')   Wt 122.5 kg (270 lb)   BMI 36.62 kg/m    Body mass index is 36.62 kg/m .    Gen- well, NAD, A+Ox3, normal color  Psych- normal mood    Laboratory  Lab Results   Component Value Date     09/28/2021     02/13/2008    POTASSIUM 3.5 09/28/2021    POTASSIUM 3.6 02/13/2008    CHLORIDE 104 09/28/2021    CHLORIDE 101 02/13/2008    CO2 25 09/28/2021    CO2 28 02/13/2008    BUN 15 09/28/2021    BUN 7 02/13/2008    CR 1.10 09/28/2021    CR 0.50 02/13/2008       Lab Results   Component Value Date    BILITOTAL 1.9  09/28/2021     09/28/2021    AST 78 09/28/2021    ALKPHOS 67 09/28/2021       Lab Results   Component Value Date    ALBUMIN 4.7 09/28/2021    PROTTOTAL 8.5 09/28/2021        Lab Results   Component Value Date    WBC 8.6 09/28/2021    WBC 15.6 02/13/2008    HGB 17.2 09/28/2021    HGB 12.3 02/13/2008    MCV 84 09/28/2021    MCV 81 02/13/2008     09/28/2021     02/13/2008       No results found for: INR      Radiology    Assessment  21 year old male with obesity and hyperlipidemia.  Imaging which was a CT scan showing marked diffuse fatty infiltration of the liver.  HOANG is the most likely diagnosis.    Plan  We will plan to do some lifestyle modifications including diet and physical activity.  He will try it himself initially and if that works well in good, otherwise he will be referred to the medical weight management group.    For his hyperlipidemia he will need to follow-up with our lipid clinic with Dr. Trent Hirsch. from cardiology.    He will be seen here in 3 months and at that time we will look if he has made any progress.  For all his other medical issues he will follow with his primary care physician.    This was a 45-minute visit of which more than 50% was spent in coordination of care and chart review.    Carlota Heard MD  Hepatology  [facility]      Video-Visit Details    Type of service:  Video Visit    Video End Time:1:29 PM.    Originating Location (pt. Location): Home    Distant Location (provider location):  [facility]     Platform used for Video Visit: Remicalm

## 2021-12-03 ENCOUNTER — DOCUMENTATION ONLY (OUTPATIENT)
Dept: BEHAVIORAL HEALTH | Facility: HOSPITAL | Age: 21
End: 2021-12-03
Payer: COMMERCIAL

## 2021-12-03 ENCOUNTER — VIRTUAL VISIT (OUTPATIENT)
Dept: BEHAVIORAL HEALTH | Facility: HOSPITAL | Age: 21
End: 2021-12-03
Payer: COMMERCIAL

## 2021-12-03 DIAGNOSIS — F43.21 ADJUSTMENT DISORDER WITH DEPRESSED MOOD: Primary | ICD-10-CM

## 2021-12-03 PROCEDURE — 90834 PSYTX W PT 45 MINUTES: CPT | Mod: GT,95 | Performed by: SOCIAL WORKER

## 2021-12-03 ASSESSMENT — ANXIETY QUESTIONNAIRES
1. FEELING NERVOUS, ANXIOUS, OR ON EDGE: SEVERAL DAYS
GAD7 TOTAL SCORE: 5
3. WORRYING TOO MUCH ABOUT DIFFERENT THINGS: SEVERAL DAYS
4. TROUBLE RELAXING: SEVERAL DAYS
7. FEELING AFRAID AS IF SOMETHING AWFUL MIGHT HAPPEN: NOT AT ALL
8. IF YOU CHECKED OFF ANY PROBLEMS, HOW DIFFICULT HAVE THESE MADE IT FOR YOU TO DO YOUR WORK, TAKE CARE OF THINGS AT HOME, OR GET ALONG WITH OTHER PEOPLE?: SOMEWHAT DIFFICULT
7. FEELING AFRAID AS IF SOMETHING AWFUL MIGHT HAPPEN: NOT AT ALL
5. BEING SO RESTLESS THAT IT IS HARD TO SIT STILL: SEVERAL DAYS
GAD7 TOTAL SCORE: 5
GAD7 TOTAL SCORE: 5
6. BECOMING EASILY ANNOYED OR IRRITABLE: NOT AT ALL
2. NOT BEING ABLE TO STOP OR CONTROL WORRYING: SEVERAL DAYS

## 2021-12-03 ASSESSMENT — PATIENT HEALTH QUESTIONNAIRE - PHQ9
SUM OF ALL RESPONSES TO PHQ QUESTIONS 1-9: 6
SUM OF ALL RESPONSES TO PHQ QUESTIONS 1-9: 6
10. IF YOU CHECKED OFF ANY PROBLEMS, HOW DIFFICULT HAVE THESE PROBLEMS MADE IT FOR YOU TO DO YOUR WORK, TAKE CARE OF THINGS AT HOME, OR GET ALONG WITH OTHER PEOPLE: SOMEWHAT DIFFICULT

## 2021-12-03 NOTE — PROGRESS NOTES
[facility]   Mental Health & Addiction Services     Progress Note - Initial Visit    Patient  Name:  Jamari Cota Date: 12/03/2021         Service Type: Individual     Visit Start Time: 8:00  Visit End Time: 8:52    Visit #: 1    Attendees: Client    Service Modality:  Video Visit:      Provider verified identity through the following two step process.  Patient provided:  Patient address; full name    Telemedicine Visit: The patient's condition can be safely assessed and treated via synchronous audio and visual telemedicine encounter.      Reason for Telemedicine Visit: Services only offered telehealth    Originating Site (Patient Location): Patient's home    Distant Site (Provider Location): [facility]    Consent:  The patient/guardian has verbally consented to: the potential risks and benefits of telemedicine (video visit) versus in person care; bill my insurance or make self-payment for services provided; and responsibility for payment of non-covered services.     Patient would like the video invitation sent by:  My Chart    Mode of Communication:  Video Conference via Amwell    As the provider I attest to compliance with applicable laws and regulations related to telemedicine.    DATA:   Interactive Complexity: No   Crisis: No     Presenting Concerns/  Current Stressors:  Much has been going on over time: Medical health issues( Liver Issues that dictated different life styles. Has to lose lbs 70 by March next year) down 20 lbs already. Food also is restricted. Can no longer drink alcohol which was helping with socialization. Graduated from College with a degree in computer science, in the middle of the pandemic last year. Has not found a job in his field. Feels okay for current FT job but not what he wishes to be at for a long time. Family issues: Maternal Grand father past away 2 months ago. Were very close. Step father has been  "diagnosed with stage 4 lung cancer since 5-6 years ago. He is getting weaker due to treatments. Mother is not accepting the patient as he is: bisexual/singh.  Patient broke up with his girl friend on 1/15/2020 after 4 years in relationship. Step little sister(15) is having some issues due to seeing father ill all the time. Patient has had a short term counseling while in school at [place] [employer]. He did not get much. Today, he stated,  \"I want to make a plan of how I should go about feeling better\"    ASSESSMENT:  Mental Status Assessment:  Appearance:   Appropriate   Eye Contact:   Good   Psychomotor Behavior: Normal   Attitude:   Cooperative   Orientation:   Person Place Time Situation  Speech   Rate / Production: Normal/ Responsive   Volume:  Normal   Mood:    Anxious  Depressed   Affect:    Appropriate   Thought Content:  Clear   Thought Form:  Coherent  Logical   Insight:    Good     Safety Issues and Plan for Safety and Risk Management:     [place] Suicide Severity Rating Scale (Short Version)  [place] Suicide Severity Rating (Short Version) 9/28/2021 12/3/2021   Over the past 2 weeks have you felt down, depressed, or hopeless? no no   Over the past 2 weeks have you had thoughts of killing yourself? no no   Have you ever attempted to kill yourself? no no     Patient denies current fears or concerns for personal safety.  Patient denies current or recent suicidal ideation or behaviors.  Patient denies current or recent homicidal ideation or behaviors.  Patient denies current or recent self injurious behavior or ideation.  Patient denies other safety concerns.  Recommended that patient call 911 or go to the local ED should there be a change in any of these risk factors.  Patient reports there are no firearms in the house.     Diagnostic Criteria:  Anxiety:   - Being easily fatigued- several days   - Difficulty concentrating- several days    - Irritability- several days   - Difficulty falling or staying asleep, or " restless unsatisfying sleep- several days    Depression:   - Depressed mood- several days     - Diminished interest or pleasure in all, or almost all, activities- more than half the days    - Fatigue or loss of energy- several days   - Diminished ability to think or concentrate, or indecisiveness- several days     DSM5 Diagnoses: (Sustained by DSM5 Criteria Listed Above)  Diagnoses: Adjustment Disorders  309.28 (F43.23) With mixed anxiety and depressed mood.  Psychosocial & Contextual Factors:  Health issues, family illness, recent loss of grand father. Step father has stage 4 lung cancer for the last 5-6 years. Issues around acceptance from mom, concerns about little sister. On the read for a suitable employment in the field he has education in.   WHODAS 2.0 (12 item):   WHODAS 2.0 Total Score 11/29/2021   Total Score 24   Total Score MyChart 24     Intervention:   Mindfulness- Patient was educated on relaxation and mindfulness techniques and some education on CBT:  on how our thoughts influence our feelings and behaviors. encouagement to continue practicing current coping skills.   Collateral Reports Completed:  Not Applicable    PLAN: (Homework, other):  1. Provider will continue Diagnostic Assessment.  Patient was given the following to do until next session: 12/14/2021    2. Provider recommended the following referrals:  Referral will be made as necessary and appropriate with a discussion with the patient.     3.  Suicide Risk and Safety Concerns were assessed for Jamari Cota.    Patient meets the following risk assessment and triage: When the [place] Suicide Severity Rating Scale has been completed, the patient identifies lifetime history of suicidal ideation and/or Suicidal Behavior that is greater than 10 years.      The recommendation is to provide the Brief Safety Plan:    Adult Short Safety Plan:   Name: Jamari Cota  YOB: 2000  Date: December 3, 2021   My primary care provider: No  Ref-Primary, Physician  My primary care clinic: Has specilists  My prescriber: Not ID  Other care team support:  Patient is working with a specialist for his GI issues.    My Triggers:  Relationship conflict : sexual orientation not accepted by mother. was very close to grand father , he passed away.  very close to step father, he is very ill. , Financial concerns : would like to find a job in his field and Medical Health : GI concerns affect his daily functionning, his social life.      Additional People, Places, and Things that I can access for support: some friends, step father, father some times         What is important to me and makes life worth living: think about a better future, family.        GREEN    Good Control  1. I feel good  2. No suicidal thoughts   3. Can work, sleep and play      Action Steps  1. Self-care: balanced meals, exercising, sleep practices, etc.  2. Take your medications as prescribed.  3. Continue meetings with therapist and prescriber.  4.  Do the healthy things that I enjoy.  5.            YELLOW  Getting Worse  I have ANY of these:  1. I do not feel good  2. Difficulty Concentrating  3. Sleep is changing  4. Increase/Change in my thoughts to hurt self and/or others, but I can still manage and not act on it.   5. Not taking care of self.  6.              Action Steps (in addition to the above):  1. Inform your therapist and psychiatric prescriber/PCP.  2. Keep taking your medications as prescribed.    3. Turn to people you can ask for help.  4. Use internal coping strategies -see below.  5. Create safe [id number]: notify friends/family of increase in symptoms  6.            RED  Get Help  If I have ANY of these:  1. Current and uncontrollable thoughts and/or behaviors to hurt self and/or others.   2.   Actions to manage my safety  1. Contact your emergency person: Pam Walsh @ [phone]  2. Call my crisis team- [facility] [phone] [facility]  Emergencies  3. Or Call 911 or go to the emergency room right away  4.          My Internal Coping Strategies include the following:  take a bath, blow bubbles, belly breathing, arts and crafts, color, chew gum, fidget toys, use my coping box, exercise and use my coping skills    [End for Brief Safety Plan]     Safety Concerns  How To Identify Situations That Make Your Mental Health Worse:  Triggers are things that make your mental health worse.  Look at the list below to help you find your triggers and what you can do about them.     1. Identify Early Warning Signs:  Sometimes symptoms return, even when people do their best to stay well. Symptoms can develop over a short period of time with little or no warning, but most of the time they emerge gradually over several weeks.  Early warning signs are changes that people experience when a relapse is starting. Some early warning signs are common and others are not as common.     Common Early Warning Signs:    Feeling tense or nervous, Eating less or eating more, Trouble sleeping -either too much or too little sleep, Feeling depressed or low, Feeling irritable, Feeling like not being around other people, Trouble concentrating, Urges to harm self, Urges to harm others and Urges to use drugs or alcohol .    2. Identify action steps to take when warning signs are noticed:  Taking Action- It is important to take action if you are experiencing early warning signs of a relapse.  The faster you act, the more likely it is that you can avoid a full relapse.  It is helpful to identify several specific ways to cope with symptoms.      The following is my list of symptoms and coping strategies that I can use when they are present:    Symptom Coping Strategies   Anxiety -Talk with someone in your support system and let him or her know how you are feeling.  -Use relaxation techniques such as deep breathing or imagery.  -Use positive affirmations to counteract negative self-talk such  as  I am learning to let go of worry.    Depression - Schedule your day; include activities you have to do and activities you enjoy doing.  - Get some exercise - walk, run, bike, or swim.  - Give yourself credit for even the smallest things you get done.   Sleep Difficulties   - Go to sleep at the same time every day.  - Do something relaxing before bed, such as drinking herbal tea or listening to music.  - Avoid having discussions about upsetting topics before going to bed.   Concentration Difficulties - Minimize distractions so there is only one thing for you to focus on at a time.    - Ask the person you are having a conversation with to slow down or repeat things you are unsure of.      Alex Merchant [facility]  December 3, 2021    Answers for HPI/ROS submitted by the patient on 12/3/2021  If you checked off any problems, how difficult have these problems made it for you to do your work, take care of things at home, or get along with other people?: Somewhat difficult  PHQ9 TOTAL SCORE: 6  MILAD 7 TOTAL SCORE: 5

## 2021-12-04 ASSESSMENT — ANXIETY QUESTIONNAIRES: GAD7 TOTAL SCORE: 5

## 2021-12-04 ASSESSMENT — PATIENT HEALTH QUESTIONNAIRE - PHQ9: SUM OF ALL RESPONSES TO PHQ QUESTIONS 1-9: 6

## 2021-12-14 ENCOUNTER — VIRTUAL VISIT (OUTPATIENT)
Dept: BEHAVIORAL HEALTH | Facility: HOSPITAL | Age: 21
End: 2021-12-14
Payer: COMMERCIAL

## 2021-12-14 DIAGNOSIS — F43.23 ADJUSTMENT DISORDER WITH MIXED ANXIETY AND DEPRESSED MOOD: Primary | ICD-10-CM

## 2021-12-14 PROCEDURE — 90791 PSYCH DIAGNOSTIC EVALUATION: CPT | Mod: GT,95 | Performed by: SOCIAL WORKER
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
## 2021-12-27 ENCOUNTER — VIRTUAL VISIT (OUTPATIENT)
Dept: BEHAVIORAL HEALTH | Facility: HOSPITAL | Age: 21
End: 2021-12-27
Payer: COMMERCIAL

## 2021-12-27 DIAGNOSIS — F43.23 ADJUSTMENT DISORDER WITH MIXED ANXIETY AND DEPRESSED MOOD: Primary | ICD-10-CM

## 2021-12-27 PROCEDURE — 90834 PSYTX W PT 45 MINUTES: CPT | Mod: GT,95 | Performed by: SOCIAL WORKER

## 2021-12-27 NOTE — PROGRESS NOTES
Progress Note    Patient Name: Jamari Cota  Date: 12/27/2021         Service Type: Individual      Session Start Time: 12:01  Session End Time: 12:53     Session Length:52    Session #1 for therapy    Attendees: Client    Service Modality:  Video Visit:      Provider verified identity through the following two step process.  Patient provided:  Patient is known previously to provider    Telemedicine Visit: The patient's condition can be safely assessed and treated via synchronous audio and visual telemedicine encounter.      Reason for Telemedicine Visit: Services only offered telehealth    Originating Site (Patient Location): Patient's home    Distant Site (Provider Location): Provider Remote Setting    Consent:  The patient/guardian has verbally consented to: the potential risks and benefits of telemedicine (video visit) versus in person care; bill my insurance or make self-payment for services provided; and responsibility for payment of non-covered services.     Patient would like the video invitation sent by:  My Chart    Mode of Communication:  Video Conference via [employer]    As the provider I attest to compliance with applicable laws and regulations related to telemedicine.     Treatment Plan Last Reviewed: 12/27/2021/Initial    PHQ-9 / MILAD-7 :6/5    DATA  Interactive Complexity: No  Crisis: No       Progress Since Last Session (Related to Symptoms / Goals / Homework):   Symptoms: No change : today is the first therapy session following the DA.    Homework: Achieved / completed to satisfaction: Has been writing which he notes helps to keep his thoughts together and express himself through writing.       Episode of Care Goals: Satisfactory progress - ACTION (Actively working towards change); Intervened by reinforcing change plan / affirming steps taken: Patient is taking action clearing his schedule for sessions and trying some coping skills discussed so far.  "     Current / Ongoing Stressors and Concerns: Patient has been reflecting on the previous sessions doing his assessment. Has started practicing some free reading and notes some how it helps. Spent a couple of days with mom, step father, and sister. No concerns during that time. Continues to work on his weight control goal. Notes progress as he is up to Lbs 30. Still feels much stress adjusting to the change especially around social interactions. He is not sure how to conduct himself with friends without having to drink. Though patient also notes it is difficult for him to feel happy. Stated, \" I don't know what to do to get better, get to the point where I don't feel sad\" . He shared, yes he can feel happy but it is short lived. He notes he tends to feel he has to be sad by default. Though he recalls being happy sometimes before college time. He is not sure how to regain that but he notes he is ready to work to figure things out. Patient expressed he can not pin point what is making him sad. Though, he lost his grand pas 3 months who was his best friend. Step father has been very weak as he is battling a stage 4 cancer for the last 5-6 years. There has been other stressors like breaking up with girl friend early in 2020. Difficulties to communicate with family about his sexual orientation and of changes in his diet dictated by his own health issues. Patient has been exposed to therapy before for a short time. He is open to start processing these issues for support and coping strategies.      Treatment Objective(s) Addressed in This Session:   identify at least 4  stressors which contribute to feelings of depression and  stressors which contribute to feelings of anxiety  increase understanding of steps in the grief process      Intervention:   CBT: Introduction to CBT modality and it's components. Patient to read more on the hand out provided via [employer].     Situation        Automatic Thoughts  Cognitive " Distortions      Feelings        Behavior        Questioning Thoughts          Motivational Interviewing    MI Intervention: Co-Developed Goal: Targeting anxiety, depression in the context of adjustment and grief. , Expressed Empathy/Understanding and Supported Autonomy, Collaboration, Evocation     Change Talk Expressed by the Patient: Committment to change    Provider Response to Change Talk: E - Evoked more info from patient about behavior change, A - Affirmed patient's thoughts, decisions, or attempts at behavior change, R - Reflected patient's change talk and S - Summarized patient's change talk statements    ASSESSMENT: Current Emotional / Mental Status (status of significant symptoms):   Risk status (Self / Other harm or suicidal ideation)   Patient denies current fears or concerns for personal safety.   Patient denies current or recent suicidal ideation or behaviors.   Patient denies current or recent homicidal ideation or behaviors.   Patient denies current or recent self injurious behavior or ideation.   Patient denies other safety concerns.   Patient reports there has been no change in risk factors since their last session.     Patient reports there has been no change in protective factors since their last session.     Recommended that patient call 911 or go to the local ED should there be a change in any of these risk factors.     Appearance:   Appropriate    Eye Contact:   Good    Psychomotor Behavior: Normal    Attitude:   Cooperative    Orientation:   Person Place Time Situation   Speech    Rate / Production: Normal/ Responsive Normal     Volume:  Normal    Mood:    Anxious  Depressed    Affect:    Appropriate    Thought Content:  Clear    Thought Form:  Coherent  Logical    Insight:    Good      Medication Review:   No changes to current psychiatric medication(s)     Current Outpatient Medications:      hydrOXYzine (ATARAX) 25 MG tablet, Take 1 tablet (25 mg) by mouth 3 times daily as needed for  itching, Disp: 90 tablet, Rfl: 2     rosuvastatin (CRESTOR) 20 MG tablet, Take 1 tablet (20 mg) by mouth daily, Disp: 90 tablet, Rfl: 3     Medication Compliance:   Yes     Changes in Health Issues:   None reported- no change as of today     Chemical Use Review:   Substance Use: Chemical use reviewed, no active concerns identified      Tobacco Use: No current tobacco use.      Diagnosis:  1. Adjustment disorder with mixed anxiety and depressed mood      Collateral Reports Completed:   Not Applicable- None assigned to patient at this time.     PLAN: (Patient Tasks / Therapist Tasks / Other)  Patient will reflect on today's discussion around his care  Patient will review his Treatment plan for questions at the next visit.   Patient will review his reading materials sent via my chart  Patient's next visit is in 2 weeks    AWA Amado                                 _________________________________________________________________  Treatment Plan    Patient's Name: Jamari Cota  YOB: 2000    Date: 12/27/2021    DSM5 Diagnoses: Adjustment Disorders  309.28 (F43.23) With mixed anxiety and depressed mood     Psychosocial / Contextual Factors: Adjusting to different issues: personal, family, social, and work.    WHODAS: 24    Referral / Collaboration:  Referral to another professional/service is not indicated at this time..    Anticipated number of session or this episode of care: 12    MeasurableTreatment Goal(s) related to diagnosis / functional impairment(s)  Goal 1: Patient will stabilize anxiety level while increasing ability to function on a daily basis.    I will know I've met my goal when anxiety level of 4/4 is reduced to 3/4 or better by the next review.      Objective #A (Patient Action)    Patient will identify at least 4 stressors which contribute to feelings of anxiety.  Status: New - Date: 12/27/2021   Intervention(s)  Therapist will assign homework : read and practice coping  "skills as assigned.    Objective #B  Patient will identify at least 4 strategies to more effectively address stressors.  Status: New - Date: 12/27/2021   Intervention(s)  Therapist will teach distraction skills. using the 5 senses to alleviate the level of stress.    Objective #C  Patient will Identify negative self-talk and behaviors: challenge core beliefs, myths, and actions.  Status: New - Date: 12/27/2021   Intervention(s)  Therapist will teach CBT skills .    Goal 2: Patient will stabilize depression level while increasing ability to function on a daily basis.    I will know I've met my goal when depression level of 4/4 is reduced to 3/4 or better by the next review.      Objective #A (Patient Action)    Status: New - Date: 12/27/2021   Patient will Improve concentration, focus, and mindfulness in daily activities .  Intervention(s)  Therapist will teach Mindfulness and mood regulation skills.    Objective #B  Patient will Increase interest, engagement, and pleasure in doing things.    Status: New - Date: 12/27/2021   Intervention(s)  Therapist will assign homework : practice distraction activities using the 5 senses.    Objective #C  Patient will Improve diet, appetite, mindful eating, and / or meal planning.  Status: New - Date: 12/27/2021   Intervention(s)  Therapist will Provide space to share feelings and thoughts, offer support,encouragement around weight control process.    Goal 3: Patient will accept the loss of her grand father and return to stable level of functioning as evidenced by a decreased level of sadness.   I will know I've met my goal when I display an understanding of the grief process and how this process may be exacerbated by or may exacerbate mental illness symptoms\"    Objective #A (Patient Action)    Status: New - Date: 12/27/2021   Patient will increase understanding of steps in the grief process.  Intervention(s)  Therapist will teach emotional regulation skills. related to the loss " and grief.    Objective #B  Patient will Express unresolved emotions regarding losses.    Status: New - Date: 12/27/2021   Intervention(s)  Therapist will provide space and time to process feellings around the grief and loss.  Redevelop a supportive social system and improve interpersonal relationships    Patient has reviewed and agreed to the above plan.    AWA Amado  December 27, 2021

## 2022-01-10 ENCOUNTER — VIRTUAL VISIT (OUTPATIENT)
Dept: BEHAVIORAL HEALTH | Facility: HOSPITAL | Age: 22
End: 2022-01-10
Payer: COMMERCIAL

## 2022-01-10 DIAGNOSIS — F43.23 ADJUSTMENT DISORDER WITH MIXED ANXIETY AND DEPRESSED MOOD: Primary | ICD-10-CM

## 2022-01-10 PROCEDURE — 90834 PSYTX W PT 45 MINUTES: CPT | Mod: GT,95 | Performed by: SOCIAL WORKER

## 2022-01-10 NOTE — PROGRESS NOTES
Progress Note    Patient Name: Jamari Cota  Date:1/10/2022         Service Type: Individual      Session Start Time: 12:02  Session End Time: 12:54     Session Length:52    Session #2     Attendees: Client    Service Modality:  Video Visit:      Provider verified identity through the following two step process.  Patient provided:  Patient is known previously to provider    Telemedicine Visit: The patient's condition can be safely assessed and treated via synchronous audio and visual telemedicine encounter.      Reason for Telemedicine Visit: Services only offered telehealth    Originating Site (Patient Location): Patient's home    Distant Site (Provider Location): Provider Remote Setting    Consent:  The patient/guardian has verbally consented to: the potential risks and benefits of telemedicine (video visit) versus in person care; bill my insurance or make self-payment for services provided; and responsibility for payment of non-covered services.     Patient would like the video invitation sent by:  My Chart    Mode of Communication:  Video Conference via [employer]    As the provider I attest to compliance with applicable laws and regulations related to telemedicine.     Treatment Plan Last Reviewed: 12/27/2021/Initial    PHQ-9 / MILAD-7 :6/5    DATA  Interactive Complexity: No  Crisis: No       Progress Since Last Session (Related to Symptoms / Goals / Homework):   Symptoms: No change : today is the first therapy session following the DA.    Homework: Achieved / completed to satisfaction: Has been writing which he notes helps to keep his thoughts together and express himself through writing. He continues to do his writing and finds it helpful.       Episode of Care Goals: Satisfactory progress - ACTION (Actively working towards change); Intervened by reinforcing change plan / affirming steps taken: Patient is showing self determination to have a different outlook on his  "life and future.      Current / Ongoing Stressors and Concerns: Patient shared his concerns today; much involving his health now, his future and how to handle certain uncomfortable concerns within the family, work and social setting. Has been feeling discouraged to have goals. Has had many and accomplished few in life.Though, remembers some of the few he accomplished such as earning a bachelors' degreed in computer science at age 19; having a 's license, buying his own car, and even rescuing a job during he pandemic which allows him to pay his rent and some other basic needs. Discussed doing and having. Discussed expectations about self, family, and work and social life. He feels he is not living up to family is much; like \" I am not a good enough brother\" Wonders how he can live comfortably like financially so he does not have to worry should he have to need more advanced care. Or how to live comfortably without having to hid who his is to people in his life or or at work. Has been careful about what to share based on some cases against one's sexual orientation. Has heard comments by his own family and wonders what they would say should they find out certain things. Patient finds some comforts in writing/journaling. He has been reviewing some CBT skills which help him to focus on positive thoughts and small accomplishments as well defining his own okay versus what the world says. Patient will review his expectations vis a vis self, family and the rest of the world. Patient indicated that there are some topics that will be hard to tackle now but will be addressed when he is ready.  He is open to continue processing these issues for support and coping strategies.      Treatment Objective(s) Addressed in This Session:   identify at least 4  stressors which contribute to feelings of depression and  stressors which contribute to feelings of anxiety  increase understanding of steps in the grief process " "     Intervention:   CBT: Review the CBT modality and it's components. Patient to continue to practice his 3 Cs as needed.    Situation        Automatic Thoughts  Cognitive Distortions      Feelings        Behavior        Questioning Thoughts          Motivational Interviewing  MI Intervention: Co-Developed Goal: Targeting anxiety, depression in the context of adjustment and grief. , Expressed Empathy/Understanding and Supported Autonomy, Collaboration, Evocation     Change Talk Expressed by the Patient: Committment to change    Provider Response to Change Talk: E - Evoked more info from patient about behavior change, A - Affirmed patient's thoughts, decisions, or attempts at behavior change, R - Reflected patient's change talk and S - Summarized patient's change talk statements     Patient might be inspired by the following quote by Henrry Hollis \"Ever Tried. Ever Failed. No matter. Try again. Fail again. Fail better\"    ASSESSMENT: Current Emotional / Mental Status (status of significant symptoms):   Risk status (Self / Other harm or suicidal ideation)   Patient denies current fears or concerns for personal safety.   Patient denies current or recent suicidal ideation or behaviors.   Patient denies current or recent homicidal ideation or behaviors.   Patient denies current or recent self injurious behavior or ideation.   Patient denies other safety concerns.   Patient reports there has been no change in risk factors since their last session.     Patient reports there has been no change in protective factors since their last session.     Recommended that patient call 911 or go to the local ED should there be a change in any of these risk factors.     Appearance:   Appropriate    Eye Contact:   Good    Psychomotor Behavior: Normal    Attitude:   Cooperative    Orientation:   Person Place Time Situation   Speech    Rate / Production: Normal/ Responsive Normal     Volume:  Normal    Mood:    Anxious  Depressed " "   Affect:    Appropriate    Thought Content:  Clear    Thought Form:  Coherent  Logical    Insight:    Good      Medication Review:   No changes to current psychiatric medication(s)     Current Outpatient Medications:      hydrOXYzine (ATARAX) 25 MG tablet, Take 1 tablet (25 mg) by mouth 3 times daily as needed for itching, Disp: 90 tablet, Rfl: 2     rosuvastatin (CRESTOR) 20 MG tablet, Take 1 tablet (20 mg) by mouth daily, Disp: 90 tablet, Rfl: 3     Medication Compliance:   Yes     Changes in Health Issues:   None reported- no change as of today     Chemical Use Review:   Substance Use: Chemical use reviewed, no active concerns identified      Tobacco Use: No current tobacco use.      Diagnosis:  1. Adjustment disorder with mixed anxiety and depressed mood      Collateral Reports Completed:   Not Applicable- None assigned to patient at this time.     PLAN: (Patient Tasks / Therapist Tasks / Other)  Patient will reflect on today's discussion around his care  Patient will continue to practice his coping skills in already.   Patient will reflect on today's visit to a better decision  Patient will get familiar with his quote by Henrry Hollis \"Ever Tried. Ever Failed. No matter. Try again. Fail again. Fail better\"  Patient's next visit is in 2 weeks    AWA Amado                                 _________________________________________________________________  Treatment Plan    Patient's Name: Jamari Cota  YOB: 2000    Date: 12/27/2021    DSM5 Diagnoses: Adjustment Disorders  309.28 (F43.23) With mixed anxiety and depressed mood     Psychosocial / Contextual Factors: Adjusting to different issues: personal, family, social, and work.    WHODAS: 24    Referral / Collaboration:  Referral to another professional/service is not indicated at this time..    Anticipated number of session or this episode of care: 12    MeasurableTreatment Goal(s) related to diagnosis / functional " impairment(s)  Goal 1: Patient will stabilize anxiety level while increasing ability to function on a daily basis.    I will know I've met my goal when anxiety level of 4/4 is reduced to 3/4 or better by the next review.      Objective #A (Patient Action)    Patient will identify at least 4 stressors which contribute to feelings of anxiety.  Status: New - Date: 12/27/2021   Intervention(s)  Therapist will assign homework : read and practice coping skills as assigned.    Objective #B  Patient will identify at least 4 strategies to more effectively address stressors.  Status: New - Date: 12/27/2021   Intervention(s)  Therapist will teach distraction skills. using the 5 senses to alleviate the level of stress.    Objective #C  Patient will Identify negative self-talk and behaviors: challenge core beliefs, myths, and actions.  Status: New - Date: 12/27/2021   Intervention(s)  Therapist will teach CBT skills .    Goal 2: Patient will stabilize depression level while increasing ability to function on a daily basis.    I will know I've met my goal when depression level of 4/4 is reduced to 3/4 or better by the next review.      Objective #A (Patient Action)    Status: New - Date: 12/27/2021   Patient will Improve concentration, focus, and mindfulness in daily activities .  Intervention(s)  Therapist will teach Mindfulness and mood regulation skills.    Objective #B  Patient will Increase interest, engagement, and pleasure in doing things.    Status: New - Date: 12/27/2021   Intervention(s)  Therapist will assign homework : practice distraction activities using the 5 senses.    Objective #C  Patient will Improve diet, appetite, mindful eating, and / or meal planning.  Status: New - Date: 12/27/2021   Intervention(s)  Therapist will Provide space to share feelings and thoughts, offer support,encouragement around weight control process.    Goal 3: Patient will accept the loss of her grand father and return to stable level of  "functioning as evidenced by a decreased level of sadness.   I will know I've met my goal when I display an understanding of the grief process and how this process may be exacerbated by or may exacerbate mental illness symptoms\"    Objective #A (Patient Action)    Status: New - Date: 12/27/2021   Patient will increase understanding of steps in the grief process.  Intervention(s)  Therapist will teach emotional regulation skills. related to the loss and grief.    Objective #B  Patient will Express unresolved emotions regarding losses.    Status: New - Date: 12/27/2021   Intervention(s)  Therapist will provide space and time to process feellings around the grief and loss.  Redevelop a supportive social system and improve interpersonal relationships    Patient has reviewed and agreed to the above plan.    AWA Amado  December 27, 2021        "

## 2022-01-24 ENCOUNTER — VIRTUAL VISIT (OUTPATIENT)
Dept: BEHAVIORAL HEALTH | Facility: HOSPITAL | Age: 22
End: 2022-01-24
Payer: COMMERCIAL

## 2022-01-24 DIAGNOSIS — F43.23 ADJUSTMENT DISORDER WITH MIXED ANXIETY AND DEPRESSED MOOD: Primary | ICD-10-CM

## 2022-01-24 PROCEDURE — 90834 PSYTX W PT 45 MINUTES: CPT | Mod: GT,95 | Performed by: SOCIAL WORKER

## 2022-01-24 ASSESSMENT — ANXIETY QUESTIONNAIRES
2. NOT BEING ABLE TO STOP OR CONTROL WORRYING: SEVERAL DAYS
3. WORRYING TOO MUCH ABOUT DIFFERENT THINGS: SEVERAL DAYS
GAD7 TOTAL SCORE: 7
6. BECOMING EASILY ANNOYED OR IRRITABLE: NOT AT ALL
IF YOU CHECKED OFF ANY PROBLEMS ON THIS QUESTIONNAIRE, HOW DIFFICULT HAVE THESE PROBLEMS MADE IT FOR YOU TO DO YOUR WORK, TAKE CARE OF THINGS AT HOME, OR GET ALONG WITH OTHER PEOPLE: SOMEWHAT DIFFICULT
5. BEING SO RESTLESS THAT IT IS HARD TO SIT STILL: NEARLY EVERY DAY
1. FEELING NERVOUS, ANXIOUS, OR ON EDGE: SEVERAL DAYS
7. FEELING AFRAID AS IF SOMETHING AWFUL MIGHT HAPPEN: NOT AT ALL

## 2022-01-24 ASSESSMENT — PATIENT HEALTH QUESTIONNAIRE - PHQ9
SUM OF ALL RESPONSES TO PHQ QUESTIONS 1-9: 8
5. POOR APPETITE OR OVEREATING: SEVERAL DAYS

## 2022-01-24 NOTE — PROGRESS NOTES
Progress Note    Patient Name: Jamari Cota  Date:1/24/2022         Service Type: Individual      Session Start Time: 12:02  Session End Time: 12:45     Session Length:43    Session #3     Attendees: Client    Service Modality:  Video Visit:      Provider verified identity through the following two step process.  Patient provided:  Patient is known previously to provider    Telemedicine Visit: The patient's condition can be safely assessed and treated via synchronous audio and visual telemedicine encounter.      Reason for Telemedicine Visit: Services only offered telehealth    Originating Site (Patient Location): Patient's home    Distant Site (Provider Location): Provider Remote Setting    Consent:  The patient/guardian has verbally consented to: the potential risks and benefits of telemedicine (video visit) versus in person care; bill my insurance or make self-payment for services provided; and responsibility for payment of non-covered services.     Patient would like the video invitation sent by:  My Chart    Mode of Communication:  Video Conference via [employer]    As the provider I attest to compliance with applicable laws and regulations related to telemedicine.     Treatment Plan Last Reviewed: 12/27/2021/Initial    PHQ-9 / MILAD-7 :8/7    DATA  Interactive Complexity: No  Crisis: No       Progress Since Last Session (Related to Symptoms / Goals / Homework):   Symptoms: No change : today is the first therapy session following the DA.    Homework: Achieved / completed to satisfaction: Has been writing which he notes helps to keep his thoughts together and express himself through writing. He continues to do his writing and finds it helpful.       Episode of Care Goals: Satisfactory progress - ACTION (Actively working towards change); Intervened by reinforcing change plan / affirming steps taken: Patient is showing self determination to have a different outlook on  "his life and future.      Current / Ongoing Stressors and Concerns: Patient returned to his session with some mixed report. Has been doing well with is weight loss. Feels better and breathing much better when walking and a the gym  and noticing some more energy. He also stated he feels bad about self. He does not see/feel his success. He feels \" I don't like success\" which he also states it is not true. Only he has a hard time to see his small steps as his friends are much more better off with good jobs, independency, and stable relationships.  He notes he is not going faster to achieve these things. He would like to build his confidence which he stated is at 3/10 today. He has noted his 3 Cs are working sometimes and he plans to continue using them. His next visit is in 2 weeks.      Treatment Objective(s) Addressed in This Session:   identify at least 4  stressors which contribute to feelings of depression and  stressors which contribute to feelings of anxiety  increase understanding of steps in the grief process      Intervention:   CBT: Review the CBT modality and it's components. Patient to continue to practice his 3 Cs as needed.    Situation        Automatic Thoughts  Cognitive Distortions      Feelings        Behavior        Questioning Thoughts          Motivational Interviewing  MI Intervention: Co-Developed Goal: Targeting anxiety, depression in the context of adjustment and grief. , Expressed Empathy/Understanding and Supported Autonomy, Collaboration, Evocation     Change Talk Expressed by the Patient: Committment to change    Provider Response to Change Talk: E - Evoked more info from patient about behavior change, A - Affirmed patient's thoughts, decisions, or attempts at behavior change, R - Reflected patient's change talk and S - Summarized patient's change talk statements     Patient might be inspired by the following quote by Henrry Hollis \"Ever Tried. Ever Failed. No matter. Try again. Fail " "again. Fail better\"    ASSESSMENT: Current Emotional / Mental Status (status of significant symptoms):   Risk status (Self / Other harm or suicidal ideation)   Patient denies current fears or concerns for personal safety.   Patient denies current or recent suicidal ideation or behaviors.   Patient denies current or recent homicidal ideation or behaviors.   Patient denies current or recent self injurious behavior or ideation.   Patient denies other safety concerns.   Patient reports there has been no change in risk factors since their last session.     Patient reports there has been no change in protective factors since their last session.     Recommended that patient call 911 or go to the local ED should there be a change in any of these risk factors.     Appearance:   Appropriate    Eye Contact:   Good    Psychomotor Behavior: Normal    Attitude:   Cooperative    Orientation:   Person Place Time Situation   Speech    Rate / Production: Normal/ Responsive Normal     Volume:  Normal    Mood:    Anxious  Depressed    Affect:    Appropriate    Thought Content:  Clear    Thought Form:  Coherent  Logical    Insight:    Good      Medication Review:   No changes to current psychiatric medication(s)     Current Outpatient Medications:      hydrOXYzine (ATARAX) 25 MG tablet, Take 1 tablet (25 mg) by mouth 3 times daily as needed for itching, Disp: 90 tablet, Rfl: 2     rosuvastatin (CRESTOR) 20 MG tablet, Take 1 tablet (20 mg) by mouth daily, Disp: 90 tablet, Rfl: 3     Medication Compliance:   Yes     Changes in Health Issues:   None reported- no change as of today     Chemical Use Review:   Substance Use: Chemical use reviewed, no active concerns identified      Tobacco Use: No current tobacco use.      Diagnosis:  1. Adjustment disorder with mixed anxiety and depressed mood      Collateral Reports Completed:   Not Applicable- None assigned to patient at this time.     PLAN: (Patient Tasks / Therapist Tasks / Other): Keep " "these goals  Patient will reflect on today's discussion around his care  Patient will continue to practice his coping skills in already.   Patient will reflect on today's visit to a better decision  Patient will get familiar with his quote by Henrry Hollis \"Ever Tried. Ever Failed. No matter. Try again. Fail again. Fail better\"  Patient's next visit is in 2 weeks    Alex Merchant, LICSW                                 _________________________________________________________________  Treatment Plan    Patient's Name: Jamari Cota  YOB: 2000    Date: 12/27/2021    DSM5 Diagnoses: Adjustment Disorders  309.28 (F43.23) With mixed anxiety and depressed mood     Psychosocial / Contextual Factors: Adjusting to different issues: personal, family, social, and work.    WHODAS: 24    Referral / Collaboration:  Referral to another professional/service is not indicated at this time..    Anticipated number of session or this episode of care: 12    MeasurableTreatment Goal(s) related to diagnosis / functional impairment(s)  Goal 1: Patient will stabilize anxiety level while increasing ability to function on a daily basis.    I will know I've met my goal when anxiety level of 4/4 is reduced to 3/4 or better by the next review.      Objective #A (Patient Action)    Patient will identify at least 4 stressors which contribute to feelings of anxiety.  Status: New - Date: 12/27/2021   Intervention(s)  Therapist will assign homework : read and practice coping skills as assigned.    Objective #B  Patient will identify at least 4 strategies to more effectively address stressors.  Status: New - Date: 12/27/2021   Intervention(s)  Therapist will teach distraction skills. using the 5 senses to alleviate the level of stress.    Objective #C  Patient will Identify negative self-talk and behaviors: challenge core beliefs, myths, and actions.  Status: New - Date: 12/27/2021   Intervention(s)  Therapist will teach CBT " "skills .    Goal 2: Patient will stabilize depression level while increasing ability to function on a daily basis.    I will know I've met my goal when depression level of 4/4 is reduced to 3/4 or better by the next review.      Objective #A (Patient Action)    Status: New - Date: 12/27/2021   Patient will Improve concentration, focus, and mindfulness in daily activities .  Intervention(s)  Therapist will teach Mindfulness and mood regulation skills.    Objective #B  Patient will Increase interest, engagement, and pleasure in doing things.    Status: New - Date: 12/27/2021   Intervention(s)  Therapist will assign homework : practice distraction activities using the 5 senses.    Objective #C  Patient will Improve diet, appetite, mindful eating, and / or meal planning.  Status: New - Date: 12/27/2021   Intervention(s)  Therapist will Provide space to share feelings and thoughts, offer support,encouragement around weight control process.    Goal 3: Patient will accept the loss of her grand father and return to stable level of functioning as evidenced by a decreased level of sadness.   I will know I've met my goal when I display an understanding of the grief process and how this process may be exacerbated by or may exacerbate mental illness symptoms\"    Objective #A (Patient Action)    Status: New - Date: 12/27/2021   Patient will increase understanding of steps in the grief process.  Intervention(s)  Therapist will teach emotional regulation skills. related to the loss and grief.    Objective #B  Patient will Express unresolved emotions regarding losses.    Status: New - Date: 12/27/2021   Intervention(s)  Therapist will provide space and time to process feellings around the grief and loss.  Redevelop a supportive social system and improve interpersonal relationships    Patient has reviewed and agreed to the above plan.    MILAN AmadoSW  December 27, 2021      "

## 2022-01-25 ASSESSMENT — ANXIETY QUESTIONNAIRES: GAD7 TOTAL SCORE: 7

## 2022-02-03 NOTE — PROGRESS NOTES
PREVENTIVE CARDIOLOGY INITIAL CONSULTATION    HPI:  Jamari Cota is a 21 year old male who was referred to Cardiology clinic by Dr. Heard in gastroenterology for evaluation management of hyperlipidemia in the setting of nonalcoholic steatohepatitis.    Jamari is a pleasant young man with no history of clinical atherosclerotic CVD.  Other than elevated cholesterol he had some elevated blood pressures in the past, but more recently they have been very well controlled.  He does not smoke and his fasting glucose is normal.  No family history of atherosclerotic CVD.    We was referred to the GI clinic after he was found to have elevated transaminases.  Subsequent CT showed fatty liver disease.  After this diagnosis knee has made substantial changes to his diet which is outlined below.  In addition he has started to go to the gym 3-4 times per week for 45-minute workout.  He does 20 to 30 minutes of cardio and the rest of the time he lifts weights.  He started making these changes about 3 months ago and has already lost about 25 pounds.  Prior to these changes he was eating fast food every day and was relatively sedentary.    Breakfast: water and granola bar  Lunch: chicken stir deal with vegetables; poke bowl; little rice  Dinner: pasta, veggie lasagna, frozen pasta; very little red meat; some hot dishes  Snacks: dried bean pods; veggie straws, banana  Drinks: water, cut out soda, sparkling water, lemonade or tea; no alcohol  Not too much fruit    Other relevant history includes snoring, but no known history of apnea.    The ASCVD Risk score (Jacob RUSH Jr., et al., 2013) failed to calculate for the following reasons:    The 2013 ASCVD risk score is only valid for ages 40 to 79     ASSESSMENT AND PLAN  Jamari Cota is a 21 year old male with recent diagnosis of HOANG in the setting of very high LDL cholesterol and normal fasting glucose.  Prior to this diagnosis he had a very unhealthy dietary pattern and a sedentary  lifestyle which is most likely the cause of his elevated cholesterol rather than a genetic component.  Today we discussed healthy dietary patterns and I congratulated him on the progress he has made.  We discussed ways to continue reducing simple carbohydrates and to increase fruits and vegetables.  For physical activity we discussed adding in high intensity interval training to his treadmill routine.  We repeated his lipid panel which showed an outstanding decrease in LDL cholesterol from 180 to 93 mg/dL.    Recommendations:  -Continue working on reducing simple carbohydrates and adding more fruits and vegetables into the diet.  -Look into high intensity interval training  -No change in medications at this time  -Follow-up with me in 3 to 6 months    Thank you for the opportunity to participate in the care of Mr. Jamari Cota. Please feel free to contact me with any additional questions or concerns.    Davon Ocasio MD    Preventive Cardiology  Pager: [phone]    The total time of this encounter amounted to 50 minutes. This time included time spent with the patient, reviewing records, ordering tests, and performing post visit documentation.     PAST MEDICAL HISTORY:  Patient Active Problem List   Diagnosis     Overweight child     Adjustment disorder with depressed mood     Asymmetrical sensorineural hearing loss     Allergic rhinitis due to animals     Past Medical History:   Diagnosis Date     Fatty liver      Otitis media with effusion     chronic     Varicella     about age 3       CURRENT MEDICATIONS:  Current Outpatient Medications   Medication Sig Dispense Refill     hydrOXYzine (ATARAX) 25 MG tablet Take 1 tablet (25 mg) by mouth 3 times daily as needed for itching 90 tablet 2     rosuvastatin (CRESTOR) 20 MG tablet Take 1 tablet (20 mg) by mouth daily 90 tablet 3       PAST SURGICAL HISTORY:  Past Surgical History:   Procedure Laterality Date     PE TUBES       SURGICAL HISTORY OF -  "      arm surgery       ALLERGIES  Codeine    FAMILY HX:  Family History   Problem Relation Age of Onset     Hyperlipidemia Father      Diabetes Paternal Grandmother         Type II     Hyperlipidemia Paternal Grandmother      Diabetes Paternal Grandfather         Type II     Hyperlipidemia Paternal Grandfather      Cancer Maternal Uncle         eye cancer     Hypertension No family hx of      Coronary Artery Disease No family hx of      Breast Cancer No family hx of      Colon Cancer No family hx of      Prostate Cancer No family hx of        SOCIAL HX:  Social History     Tobacco Use     Smoking status: Never Smoker     Smokeless tobacco: Never Used     Tobacco comment: non smoking home, mother and step father smoke   Vaping Use     Vaping Use: Never used   Substance Use Topics     Alcohol use: No     Alcohol/week: 0.0 standard drinks     Drug use: No        ROS:  Comprehensive ROS is negative except as noted in HPI.    VITAL SIGNS:  /72 (BP Location: Right arm, Patient Position: Chair, Cuff Size: Adult Large)   Pulse 80   Ht 1.84 m (6' 0.44\")   Wt 108.8 kg (239 lb 12.8 oz)   SpO2 98%   BMI 32.13 kg/m    Body mass index is 32.13 kg/m .  Wt Readings from Last 2 Encounters:   02/04/22 108.8 kg (239 lb 12.8 oz)   11/08/21 122.5 kg (270 lb)       PHYSICAL EXAM  Gen: pleasant male sitting comfortably in NAD  Head: nc/at  Eyes: EOMI, no corneal arcus  CV: nml s1/s2, no murmur or gallop; no JVD  Chest: clear lungs  Abd: soft, NT, NABS  Ext: warm, no LE edema  Skin: no rash on limited exam  Neuro: awake, alert, oriented, nml speech and gait  Vasc: 2+ bilateral carotid, radial, DP and PT pulses; no bruits noted    LABS: personally reviewed  CMP  Recent Labs   Lab Test 09/28/21  1707      POTASSIUM 3.5   CHLORIDE 104   CO2 25   ANIONGAP 8   GLC 91   BUN 15   CR 1.10   GFRESTIMATED >90   TORI 9.2   PROTTOTAL 8.5   ALBUMIN 4.7   BILITOTAL 1.9*   ALKPHOS 67   AST 78*   *     CBC  Recent Labs   Lab Test " 09/28/21  1707   WBC 8.6   RBC 6.05*   HGB 17.2   HCT 51.0   MCV 84   MCH 28.4   MCHC 33.7   RDW 12.1        INRNo lab results found.  Recent Labs   Lab Test 02/04/22  1022 10/01/21  0831   CHOL 148 241*   HDL 34* 30*   LDL 93 180*   TRIG 104 154*     No lab results found.

## 2022-02-04 ENCOUNTER — OFFICE VISIT (OUTPATIENT)
Dept: CARDIOLOGY | Facility: CLINIC | Age: 22
End: 2022-02-04
Attending: INTERNAL MEDICINE
Payer: COMMERCIAL

## 2022-02-04 ENCOUNTER — LAB (OUTPATIENT)
Dept: LAB | Facility: CLINIC | Age: 22
End: 2022-02-04
Attending: INTERNAL MEDICINE
Payer: COMMERCIAL

## 2022-02-04 VITALS
SYSTOLIC BLOOD PRESSURE: 118 MMHG | WEIGHT: 239.8 LBS | DIASTOLIC BLOOD PRESSURE: 72 MMHG | OXYGEN SATURATION: 98 % | HEIGHT: 72 IN | HEART RATE: 80 BPM | BODY MASS INDEX: 32.48 KG/M2

## 2022-02-04 DIAGNOSIS — E66.09 CLASS 1 OBESITY DUE TO EXCESS CALORIES WITH SERIOUS COMORBIDITY AND BODY MASS INDEX (BMI) OF 32.0 TO 32.9 IN ADULT: ICD-10-CM

## 2022-02-04 DIAGNOSIS — K75.81 NASH (NONALCOHOLIC STEATOHEPATITIS): Primary | ICD-10-CM

## 2022-02-04 DIAGNOSIS — E78.2 MIXED HYPERLIPIDEMIA: ICD-10-CM

## 2022-02-04 DIAGNOSIS — K75.81 NASH (NONALCOHOLIC STEATOHEPATITIS): ICD-10-CM

## 2022-02-04 DIAGNOSIS — E66.811 CLASS 1 OBESITY DUE TO EXCESS CALORIES WITH SERIOUS COMORBIDITY AND BODY MASS INDEX (BMI) OF 32.0 TO 32.9 IN ADULT: ICD-10-CM

## 2022-02-04 LAB
CHOLEST SERPL-MCNC: 148 MG/DL
FASTING STATUS PATIENT QL REPORTED: ABNORMAL
HDLC SERPL-MCNC: 34 MG/DL
LDLC SERPL CALC-MCNC: 93 MG/DL
NONHDLC SERPL-MCNC: 114 MG/DL
TRIGL SERPL-MCNC: 104 MG/DL

## 2022-02-04 PROCEDURE — G0463 HOSPITAL OUTPT CLINIC VISIT: HCPCS

## 2022-02-04 PROCEDURE — 36415 COLL VENOUS BLD VENIPUNCTURE: CPT | Performed by: PATHOLOGY

## 2022-02-04 PROCEDURE — 80061 LIPID PANEL: CPT | Performed by: PATHOLOGY

## 2022-02-04 PROCEDURE — 99204 OFFICE O/P NEW MOD 45 MIN: CPT | Performed by: INTERNAL MEDICINE

## 2022-02-04 ASSESSMENT — PAIN SCALES - GENERAL: PAINLEVEL: NO PAIN (0)

## 2022-02-04 ASSESSMENT — MIFFLIN-ST. JEOR: SCORE: 2127.73

## 2022-02-04 NOTE — NURSING NOTE
Chief Complaint   Patient presents with     New Patient      New [facility] pt; referred for mixed HLD     Vitals were taken and medications reconciled.    Declan Quintero, EMT  8:46 AM

## 2022-02-04 NOTE — PATIENT INSTRUCTIONS
"You were seen today in the Cardiovascular Clinic at the [facility].     Cardiology Providers you saw during your visit: Dr. Virgil Ocasio     Diagnosis:   Encounter Diagnoses   Name Primary?     Mixed hyperlipidemia      HOANG (nonalcoholic steatohepatitis) Yes        Recommendations:   1. Continue all the healthy changes you have made. Continue to work on reducing simple carbohydrates (white rice, white bread, white pasta, potatoes) and replacing with more complex carbohydrate options. Work on adding in more fruits and vegetables.   2. Continue your exercise routine. Look into high intensity interval training.   3. No change in medications at this time.  4. Lipid panel today.  5. Follow up with Dr. Virgil Ocasio in 3-6 months.        Please feel free to call me with any questions or concerns.       Ivon Bejarano RN     Questions: [phone].   First press #1 for the [employer] and then press #4 for \"Medical Questions\" to reach us Cardiology Nurses.     [phone].   First press #1 for the [employer] and then press #1     On Call Cardiologist for after hours or on weekends: [phone]   option #4 and ask to speak to the on-call Cardiologist.          If you need a medication refill please contact your pharmacy.  Please allow 3 business days for your refill to be completed.  ________________________________________________________________________________________________________________________________    Patient Education     Mediterranean Diet  A heart healthy eating plan  The Mediterranean Diet is based on the eating habits of people in countries near the [place] Sea. People living in this part of the world have long lives and low rates of chronic diseases. They have lower rates of death from heart disease, cancer and other illnesses.  When you follow this eating plan you'll have better control of your blood sugar and weight. The plan requires simple changes in your habits of " eating, and the whole family can take part.  Mediterranean lifestyle   Enjoy eating with others. Sit at the table with family and friends and enjoy your meal. When you eat slowly, you are able to tune in to your body's hunger and fullness signals. You're less likely to overeat.  Be physically active: Being active every day is important for overall good health. Run, walk, dance and do lighter activities such as house and yard work. Move more and sit less!  Drink plenty of water during the day.  Drink red wine with meals in modest amounts (optional).  The Mediterranean Pyramid   The pyramid shows the food groups and the amounts eaten in relation to the whole diet. It is more than a diet; it is also a life-style plan.  The largest food group at bottom contains plant foods: vegetables, fruits, grains, nuts, legumes, seeds, olives and olive oil. These foods make up the largest part of your meals.   The next groups above: fish and seafood, then poultry, cheese, eggs and yogurt are eaten less often and in smaller servings.   The top group, meats and sweets, are eaten the least often and in the smallest amounts.    Tips for adding plant foods to your meals   Vegetables and fruits:     Aim for 3 to 8 servings each day. A serving is   to 2 cups, depending on the food.    Choose a variety of colors. Big green salads are a great way to include several vegetable servings.    Try fresh fruit as a dessert: oranges, grapes, apples pomegranates or fresh figs.    At home keep fresh fruit in a bowl to tempt family.    Bring fruit and vegetables to work for a snack.  Oil     Replace butter and margarine with healthy fats such as olive oil. Other plant-based oils are canola, walnut and peanut oil. These are high in good fats. Use them in cooking, salad dressings and baking.    Drizzle your bread with olive oil instead of butter or margarine. Use herbs and spices to add flavor and aroma and to reduce fat and salt when cooking.  Whole  "grains    Look for the term \"whole\" or \"whole grain\" on the package. Processing grains removes vitamins, minerals and fiber. Whole grains may include: corn, wheat, oats, rye, rice and barley.    Examples of Mediterranean grains include: barley, farro, buckwheat, bulgur, couscous, and wheatberries.    Slowly switch to a whole grain by using whole-grain blends of pastas and rice. Or mix whole grains with refined; for example, mix whole-wheat pasta with white pasta.  Beans and legumes     Beans are a good source of protein and fiber, adding flavor and texture to dishes. Examples include cannellini beans, chickpea, blanca beans, green beans, kidney beans, lentils and split peas.    Cook a vegetarian meal one night a week: use beans or legumes with vegetables and grains.    Nuts are high in healthy fats. Try walnuts, almonds, pine nuts, hazelnuts and cashews. Avoid candied, honey-roasted and heavily salted nuts.    Limit your intake of nuts to a small handful each day. Explore ways to add to nuts to salads and other dishes.  Tips for using fish and seafood in your meals    Aim for meals with fish or shellfish at least twice a week.    Tuna, herring, salmon and sardines are rich in heart-healthy omega-3. Shellfish, such as mussels, oysters and clams, have similar benefits for brain and heart health.  Tips for using poultry, eggs and cheese and yogurt in your meals    Eat poultry or eggs at least twice a week.    Roast, broil or grill your poultry. Season with fresh or dried herbs.    Enjoy low-fat cheese or yogurt every day.  Tips for using red meat and sweets in your meals     Limit lean red meat to one time a week or 4 times a month.    Red meat has more saturated fats. Choose a lean cut, like top loin, sirloin, flank steak, strip steak or 90% lean ground beef.    Limit portions to 3 to 4 ounces.    Make whole grains and vegetables the main focus of a meal. Add meat in small amounts for flavor.    Limit sweets such as ice " cream or cookies for a special times or holidays.  Snacks    Snack on a handful of almonds, walnuts or sunflower seeds in place of chips, cookies or other processed snack foods.    Calcium-rich, low-fat cheese or low- and nonfat plain yogurt with fresh fruit are healthy snacks that are easy to take with you.  Like to know more?  For tips on shoppping, cooking and eating well the Mediterranean way, go to the [employer] website at www.[employer].  For informational purposes only. Not to replace the advice of your health care provider.   Copyright   2014 [employer]. All rights reserved.   Mediterranean pyramrid used with permission of the [employer]. [employer] 536260 - 09/15.

## 2022-02-04 NOTE — LETTER
Date:February 7, 2022      Patient was self referred, no letter generated. Do not send.        [facility] Health Information

## 2022-02-04 NOTE — LETTER
2/4/2022      RE: Jamari Cota  [address]  [facility] [address]       Dear Colleague,    Thank you for the opportunity to participate in the care of your patient, Jamari Cota, at the [facility] [place] [facility]. Please see a copy of my visit note below.    PREVENTIVE CARDIOLOGY INITIAL CONSULTATION    HPI:  Jamari Cota is a 21 year old male who was referred to Cardiology clinic by Dr. Heard in gastroenterology for evaluation management of hyperlipidemia in the setting of nonalcoholic steatohepatitis.    Jamari is a pleasant young man with no history of clinical atherosclerotic CVD.  Other than elevated cholesterol he had some elevated blood pressures in the past, but more recently they have been very well controlled.  He does not smoke and his fasting glucose is normal.  No family history of atherosclerotic CVD.    We was referred to the GI clinic after he was found to have elevated transaminases.  Subsequent CT showed fatty liver disease.  After this diagnosis knee has made substantial changes to his diet which is outlined below.  In addition he has started to go to the gym 3-4 times per week for 45-minute workout.  He does 20 to 30 minutes of cardio and the rest of the time he lifts weights.  He started making these changes about 3 months ago and has already lost about 25 pounds.  Prior to these changes he was eating fast food every day and was relatively sedentary.    Breakfast: water and granola bar  Lunch: chicken stir deal with vegetables; poke bowl; little rice  Dinner: pasta, veggie lasagna, frozen pasta; very little red meat; some hot dishes  Snacks: dried bean pods; veggie straws, banana  Drinks: water, cut out soda, sparkling water, lemonade or tea; no alcohol  Not too much fruit    Other relevant history includes snoring, but no known history of apnea.    The ASCVD Risk score ([place] RUSH Jr., et al., 2013) failed to  calculate for the following reasons:    The 2013 ASCVD risk score is only valid for ages 40 to 79     ASSESSMENT AND PLAN  Jamari Cota is a 21 year old male with recent diagnosis of HOANG in the setting of very high LDL cholesterol and normal fasting glucose.  Prior to this diagnosis he had a very unhealthy dietary pattern and a sedentary lifestyle which is most likely the cause of his elevated cholesterol rather than a genetic component.  Today we discussed healthy dietary patterns and I congratulated him on the progress he has made.  We discussed ways to continue reducing simple carbohydrates and to increase fruits and vegetables.  For physical activity we discussed adding in high intensity interval training to his treadmill routine.  We repeated his lipid panel which showed an outstanding decrease in LDL cholesterol from 180 to 93 mg/dL.    Recommendations:  -Continue working on reducing simple carbohydrates and adding more fruits and vegetables into the diet.  -Look into high intensity interval training  -No change in medications at this time  -Follow-up with me in 3 to 6 months    Thank you for the opportunity to participate in the care of Mr. Jamari Cota. Please feel free to contact me with any additional questions or concerns.    Davon Ocasio MD    Preventive Cardiology  Pager: [phone]    The total time of this encounter amounted to 50 minutes. This time included time spent with the patient, reviewing records, ordering tests, and performing post visit documentation.     PAST MEDICAL HISTORY:  Patient Active Problem List   Diagnosis     Overweight child     Adjustment disorder with depressed mood     Asymmetrical sensorineural hearing loss     Allergic rhinitis due to animals     Past Medical History:   Diagnosis Date     Fatty liver      Otitis media with effusion     chronic     Varicella     about age 3       CURRENT MEDICATIONS:  Current Outpatient Medications   Medication Sig  "Dispense Refill     hydrOXYzine (ATARAX) 25 MG tablet Take 1 tablet (25 mg) by mouth 3 times daily as needed for itching 90 tablet 2     rosuvastatin (CRESTOR) 20 MG tablet Take 1 tablet (20 mg) by mouth daily 90 tablet 3       PAST SURGICAL HISTORY:  Past Surgical History:   Procedure Laterality Date     PE TUBES       SURGICAL HISTORY OF -       arm surgery       ALLERGIES  Codeine    FAMILY HX:  Family History   Problem Relation Age of Onset     Hyperlipidemia Father      Diabetes Paternal Grandmother         Type II     Hyperlipidemia Paternal Grandmother      Diabetes Paternal Grandfather         Type II     Hyperlipidemia Paternal Grandfather      Cancer Maternal Uncle         eye cancer     Hypertension No family hx of      Coronary Artery Disease No family hx of      Breast Cancer No family hx of      Colon Cancer No family hx of      Prostate Cancer No family hx of        SOCIAL HX:  Social History     Tobacco Use     Smoking status: Never Smoker     Smokeless tobacco: Never Used     Tobacco comment: non smoking home, mother and step father smoke   Vaping Use     Vaping Use: Never used   Substance Use Topics     Alcohol use: No     Alcohol/week: 0.0 standard drinks     Drug use: No        ROS:  Comprehensive ROS is negative except as noted in HPI.    VITAL SIGNS:  /72 (BP Location: Right arm, Patient Position: Chair, Cuff Size: Adult Large)   Pulse 80   Ht 1.84 m (6' 0.44\")   Wt 108.8 kg (239 lb 12.8 oz)   SpO2 98%   BMI 32.13 kg/m    Body mass index is 32.13 kg/m .  Wt Readings from Last 2 Encounters:   02/04/22 108.8 kg (239 lb 12.8 oz)   11/08/21 122.5 kg (270 lb)       PHYSICAL EXAM  Gen: pleasant male sitting comfortably in NAD  Head: nc/at  Eyes: EOMI, no corneal arcus  CV: nml s1/s2, no murmur or gallop; no JVD  Chest: clear lungs  Abd: soft, NT, NABS  Ext: warm, no LE edema  Skin: no rash on limited exam  Neuro: awake, alert, oriented, nml speech and gait  Vasc: 2+ bilateral carotid, " radial, DP and PT pulses; no bruits noted    LABS: personally reviewed  CMP  Recent Labs   Lab Test 09/28/21  1707      POTASSIUM 3.5   CHLORIDE 104   CO2 25   ANIONGAP 8   GLC 91   BUN 15   CR 1.10   GFRESTIMATED >90   TORI 9.2   PROTTOTAL 8.5   ALBUMIN 4.7   BILITOTAL 1.9*   ALKPHOS 67   AST 78*   *     CBC  Recent Labs   Lab Test 09/28/21  1707   WBC 8.6   RBC 6.05*   HGB 17.2   HCT 51.0   MCV 84   MCH 28.4   MCHC 33.7   RDW 12.1        INRNo lab results found.  Recent Labs   Lab Test 02/04/22  1022 10/01/21  0831   CHOL 148 241*   HDL 34* 30*   LDL 93 180*   TRIG 104 154*     No lab results found.        Please do not hesitate to contact me if you have any questions/concerns.     Sincerely,     Davon Guardado'avi Ocasio MD

## 2022-02-07 ENCOUNTER — VIRTUAL VISIT (OUTPATIENT)
Dept: BEHAVIORAL HEALTH | Facility: HOSPITAL | Age: 22
End: 2022-02-07
Payer: COMMERCIAL

## 2022-02-07 DIAGNOSIS — F43.23 ADJUSTMENT DISORDER WITH MIXED ANXIETY AND DEPRESSED MOOD: Primary | ICD-10-CM

## 2022-02-07 PROCEDURE — 90834 PSYTX W PT 45 MINUTES: CPT | Mod: GT,95 | Performed by: SOCIAL WORKER

## 2022-02-08 NOTE — PROGRESS NOTES
Progress Note    Patient Name: Jamari Cota  Date:2/07/2022         Service Type: Individual      Session Start Time: 12:02  Session End Time: 12:48     Session Length:46    Session #4    Attendees: Client    Service Modality:  Video Visit:      Provider verified identity through the following two step process.  Patient provided:  Patient is known previously to provider    Telemedicine Visit: The patient's condition can be safely assessed and treated via synchronous audio and visual telemedicine encounter.      Reason for Telemedicine Visit: Services only offered telehealth    Originating Site (Patient Location): Patient's home    Distant Site (Provider Location): Provider Remote Setting    Consent:  The patient/guardian has verbally consented to: the potential risks and benefits of telemedicine (video visit) versus in person care; bill my insurance or make self-payment for services provided; and responsibility for payment of non-covered services.     Patient would like the video invitation sent by:  My Chart    Mode of Communication:  Video Conference via [employer]    As the provider I attest to compliance with applicable laws and regulations related to telemedicine.     Treatment Plan Last Reviewed: 12/27/2021/Initial    PHQ-9 / MILAD-7 :8/7    DATA  Interactive Complexity: No  Crisis: No       Progress Since Last Session (Related to Symptoms / Goals / Homework):   Symptoms: No change : today is the first therapy session following the DA.    Homework: Achieved / completed to satisfaction: Has been writing which he notes helps to keep his thoughts together and express himself through writing. He continues to do his writing and finds it helpful.       Episode of Care Goals: Satisfactory progress - ACTION (Actively working towards change); Intervened by reinforcing change plan / affirming steps taken: Patient is showing self determination to have a different outlook on  "his life and future.      Current / Ongoing Stressors and Concerns:  Patient returned to his session as scheduled. Prepared to share his feelings. Has been making effort to challenge some of the thoughts around his life in general not just about his mental health. Notes his confidence has gotten from 3/10 to 4/10 shares his reasons he is no longer at 3/10. Plans to continue working toward increase of confidence around his progress. He talked to some people, did some chores which made him feel good about himself. He also has been  things/breaking big projects, ideas in more manageable pieces to help on implementing them. He is prioritizing his needs and made an effort to take his health both mental and physical health seriously.  Got some relief from the cardiologist about his progress. Numbers are better now. Continues to lose weight to feel better. Lost Lbs 33 our of lbs 70 so far.   Patient expressed two major concerns today: \"If I want to be completely okay, I have to do big things. 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 This might require to even move out of the state. 2. Talk to parents about his ID. Patient notes neither of these things is easy. His anxiety is rooted, mainly, to this second issue. Patient notes his step dad's health looks very rough and might not live much longer. He is wondering when it is the right time to move to these two issues. With these issues, patient now is ready to work with someone specialized in areas of 'Coming out\"  In meantime, he is going to continue to focus on his immediate needs. Focusing on breaking things down to the manageable state. Think about both sides of the scenarios - can be accepted as he is, can be rejected by beloved ones. Either way, will need to be prepared for the next steps. He is going to continue to see family as planned( once a week) for mom,sister and step dad " "and. every Thursday with grand mother. Will be seen for at least another session until his next provider is ready for him. His next visit is in 2 weeks.      Treatment Objective(s) Addressed in This Session:   identify at least 4  stressors which contribute to feelings of depression and  stressors which contribute to feelings of anxiety  increase understanding of steps in the grief process      Intervention:   CBT: Review the CBT modality and it's components. Patient to continue to practice his 3 Cs as needed.    Situation        Automatic Thoughts  Cognitive Distortions      Feelings        Behavior        Questioning Thoughts          Motivational Interviewing  MI Intervention: Co-Developed Goal: Targeting anxiety, depression in the context of adjustment and grief. , Expressed Empathy/Understanding and Supported Autonomy, Collaboration, Evocation     Change Talk Expressed by the Patient: Committment to change    Provider Response to Change Talk: E - Evoked more info from patient about behavior change, A - Affirmed patient's thoughts, decisions, or attempts at behavior change, R - Reflected patient's change talk and S - Summarized patient's change talk statements   Patient might be inspired by the following quote by Henrry Hollis \"Ever Tried. Ever Failed. No matter. Try again. Fail again. Fail better\"  Continue to support the patient in the process of meeting his well defined needs.     ASSESSMENT: Current Emotional / Mental Status (status of significant symptoms):   Risk status (Self / Other harm or suicidal ideation)   Patient denies current fears or concerns for personal safety.   Patient denies current or recent suicidal ideation or behaviors.   Patient denies current or recent homicidal ideation or behaviors.   Patient denies current or recent self injurious behavior or ideation.   Patient denies other safety concerns.   Patient reports there has been no change in risk factors since their last session.  " "   Patient reports there has been no change in protective factors since their last session.     Recommended that patient call 911 or go to the local ED should there be a change in any of these risk factors.     Appearance:   Appropriate    Eye Contact:   Good    Psychomotor Behavior: Normal    Attitude:   Cooperative    Orientation:   Person Place Time Situation   Speech    Rate / Production: Normal/ Responsive Normal     Volume:  Normal    Mood:    Anxious  Depressed    Affect:    Appropriate    Thought Content:  Clear    Thought Form:  Coherent  Logical    Insight:    Good      Medication Review:   No changes to current psychiatric medication(s)     Current Outpatient Medications:      hydrOXYzine (ATARAX) 25 MG tablet, Take 1 tablet (25 mg) by mouth 3 times daily as needed for itching, Disp: 90 tablet, Rfl: 2     rosuvastatin (CRESTOR) 20 MG tablet, Take 1 tablet (20 mg) by mouth daily, Disp: 90 tablet, Rfl: 3     Medication Compliance:   Yes     Changes in Health Issues:   None reported- no change as of today 2/07/2022     Chemical Use Review:   Substance Use: Chemical use reviewed, no active concerns identified      Tobacco Use: No current tobacco use.      Diagnosis:  1. Adjustment disorder with mixed anxiety and depressed mood      Collateral Reports Completed:   Not Applicable- None assigned to patient at this time.     PLAN: (Patient Tasks / Therapist Tasks / Other): Keep these goals  Patient will reflect on today's discussion around his care  Patient will continue to practice his coping skills discussed throughout the sessions.   Patient will reflect on today's visit to a better decision  Patient will get familiar with his quote by Henrry Hollis \"Ever Tried. Ever Failed. No matter. Try again. Fail again. Fail better\"  Patient will start thinking about the transition to a provider specialized in GLBT care  Patient's next visit is in 2 weeks    AWA Amado    Provider Oversight:  Dr. Belcher " Norberto  _________________________________________________________________  Treatment Plan    Patient's Name: Jamari Cota  YOB: 2000    Date: 12/27/2021    DSM5 Diagnoses: Adjustment Disorders  309.28 (F43.23) With mixed anxiety and depressed mood     Psychosocial / Contextual Factors: Adjusting to different issues: personal, family, social, and work.    WHODAS: 24    Referral / Collaboration:  Referral to another professional/service is not indicated at this time..    Anticipated number of session or this episode of care: 12    MeasurableTreatment Goal(s) related to diagnosis / functional impairment(s)  Goal 1: Patient will stabilize anxiety level while increasing ability to function on a daily basis.    I will know I've met my goal when anxiety level of 4/4 is reduced to 3/4 or better by the next review.      Objective #A (Patient Action)    Patient will identify at least 4 stressors which contribute to feelings of anxiety.  Status: New - Date: 12/27/2021   Intervention(s)  Therapist will assign homework : read and practice coping skills as assigned.    Objective #B  Patient will identify at least 4 strategies to more effectively address stressors.  Status: New - Date: 12/27/2021   Intervention(s)  Therapist will teach distraction skills. using the 5 senses to alleviate the level of stress.    Objective #C  Patient will Identify negative self-talk and behaviors: challenge core beliefs, myths, and actions.  Status: New - Date: 12/27/2021   Intervention(s)  Therapist will teach CBT skills .    Goal 2: Patient will stabilize depression level while increasing ability to function on a daily basis.    I will know I've met my goal when depression level of 4/4 is reduced to 3/4 or better by the next review.      Objective #A (Patient Action)    Status: New - Date: 12/27/2021   Patient will Improve concentration, focus, and mindfulness in daily activities .  Intervention(s)  Therapist will teach  "Mindfulness and mood regulation skills.    Objective #B  Patient will Increase interest, engagement, and pleasure in doing things.    Status: New - Date: 12/27/2021   Intervention(s)  Therapist will assign homework : practice distraction activities using the 5 senses.    Objective #C  Patient will Improve diet, appetite, mindful eating, and / or meal planning.  Status: New - Date: 12/27/2021   Intervention(s)  Therapist will Provide space to share feelings and thoughts, offer support,encouragement around weight control process.    Goal 3: Patient will accept the loss of her grand father and return to stable level of functioning as evidenced by a decreased level of sadness.   I will know I've met my goal when I display an understanding of the grief process and how this process may be exacerbated by or may exacerbate mental illness symptoms\"    Objective #A (Patient Action)    Status: New - Date: 12/27/2021   Patient will increase understanding of steps in the grief process.  Intervention(s)  Therapist will teach emotional regulation skills. related to the loss and grief.    Objective #B  Patient will Express unresolved emotions regarding losses.    Status: New - Date: 12/27/2021   Intervention(s)  Therapist will provide space and time to process feellings around the grief and loss.  Redevelop a supportive social system and improve interpersonal relationships    Patient has reviewed and agreed to the above plan.    AWA Amado  December 27, 2021    "

## 2022-02-09 ENCOUNTER — DOCUMENTATION ONLY (OUTPATIENT)
Dept: LAB | Facility: CLINIC | Age: 22
End: 2022-02-09
Payer: COMMERCIAL

## 2022-02-09 DIAGNOSIS — K75.81 NASH (NONALCOHOLIC STEATOHEPATITIS): Primary | ICD-10-CM

## 2022-02-09 NOTE — PROGRESS NOTES
Pt coming in for labs on 2/14/22 ahead of appt w/ Dr. Heard. No orders in chart, please place necessary orders.     Claudia Langston on 2/9/2022 at 7:48 AM

## 2022-02-14 ENCOUNTER — LAB (OUTPATIENT)
Dept: LAB | Facility: CLINIC | Age: 22
End: 2022-02-14

## 2022-02-14 ENCOUNTER — OFFICE VISIT (OUTPATIENT)
Dept: GASTROENTEROLOGY | Facility: CLINIC | Age: 22
End: 2022-02-14
Payer: COMMERCIAL

## 2022-02-14 VITALS
SYSTOLIC BLOOD PRESSURE: 112 MMHG | BODY MASS INDEX: 32.37 KG/M2 | HEIGHT: 72 IN | WEIGHT: 239 LBS | TEMPERATURE: 98.5 F | HEART RATE: 66 BPM | OXYGEN SATURATION: 98 % | DIASTOLIC BLOOD PRESSURE: 73 MMHG

## 2022-02-14 DIAGNOSIS — K75.81 NASH (NONALCOHOLIC STEATOHEPATITIS): Primary | ICD-10-CM

## 2022-02-14 DIAGNOSIS — K75.81 NASH (NONALCOHOLIC STEATOHEPATITIS): ICD-10-CM

## 2022-02-14 LAB
ALBUMIN SERPL-MCNC: 4.1 G/DL (ref 3.4–5)
ALP SERPL-CCNC: 57 U/L (ref 40–150)
ALT SERPL W P-5'-P-CCNC: 49 U/L (ref 0–70)
ANION GAP SERPL CALCULATED.3IONS-SCNC: 5 MMOL/L (ref 3–14)
AST SERPL W P-5'-P-CCNC: 20 U/L (ref 0–45)
BILIRUB DIRECT SERPL-MCNC: 0.3 MG/DL (ref 0–0.2)
BILIRUB SERPL-MCNC: 1.6 MG/DL (ref 0.2–1.3)
BUN SERPL-MCNC: 11 MG/DL (ref 7–30)
CALCIUM SERPL-MCNC: 9.1 MG/DL (ref 8.5–10.1)
CHLORIDE BLD-SCNC: 109 MMOL/L (ref 94–109)
CO2 SERPL-SCNC: 26 MMOL/L (ref 20–32)
CREAT SERPL-MCNC: 0.92 MG/DL (ref 0.66–1.25)
ERYTHROCYTE [DISTWIDTH] IN BLOOD BY AUTOMATED COUNT: 13.5 % (ref 10–15)
GFR SERPL CREATININE-BSD FRML MDRD: >90 ML/MIN/1.73M2
GLUCOSE BLD-MCNC: 93 MG/DL (ref 70–99)
HCT VFR BLD AUTO: 48.5 % (ref 40–53)
HGB BLD-MCNC: 15.6 G/DL (ref 13.3–17.7)
INR PPP: 0.99 (ref 0.85–1.15)
MCH RBC QN AUTO: 28.7 PG (ref 26.5–33)
MCHC RBC AUTO-ENTMCNC: 32.2 G/DL (ref 31.5–36.5)
MCV RBC AUTO: 89 FL (ref 78–100)
PLATELET # BLD AUTO: 253 10E3/UL (ref 150–450)
POTASSIUM BLD-SCNC: 4.1 MMOL/L (ref 3.4–5.3)
PROT SERPL-MCNC: 7.7 G/DL (ref 6.8–8.8)
RBC # BLD AUTO: 5.43 10E6/UL (ref 4.4–5.9)
SODIUM SERPL-SCNC: 140 MMOL/L (ref 133–144)
WBC # BLD AUTO: 6.5 10E3/UL (ref 4–11)

## 2022-02-14 PROCEDURE — 85027 COMPLETE CBC AUTOMATED: CPT

## 2022-02-14 PROCEDURE — 36415 COLL VENOUS BLD VENIPUNCTURE: CPT

## 2022-02-14 PROCEDURE — 85610 PROTHROMBIN TIME: CPT

## 2022-02-14 PROCEDURE — 82248 BILIRUBIN DIRECT: CPT

## 2022-02-14 PROCEDURE — 99214 OFFICE O/P EST MOD 30 MIN: CPT | Performed by: INTERNAL MEDICINE

## 2022-02-14 PROCEDURE — 80053 COMPREHEN METABOLIC PANEL: CPT

## 2022-02-14 ASSESSMENT — PAIN SCALES - GENERAL: PAINLEVEL: NO PAIN (0)

## 2022-02-14 ASSESSMENT — MIFFLIN-ST. JEOR: SCORE: 2117.1

## 2022-02-14 NOTE — PROGRESS NOTES
[facility]    Follow-up Visit    Follow-up visit for HOANG    Referring provider:               Twyla Sterling    Subjective:  21 year old male with multiple risk factors for HOANG whom we have initially seen for abnormal liver function tests.  He had hyperlipidemia and was recently started on Crestor.  These have normalized since then.  He also loss intentionally 35 pounds in 3 months.    Patient denies jaundice, lower extremity edema, abdominal distension, lethargy or confusion. Patient denies melena, hematemesis or hematochezia.  He denies any abdominal pain, nausea, vomiting or change in bowel habits.     Patient denies fevers, sweats or chills.  He has been vaccinated for the COVID-19 and has done the Pfizer first 2 doses and is scheduled to have his booster tomorrow.        Medical hx Surgical hx   Past Medical History:   Diagnosis Date     Fatty liver      Otitis media with effusion     chronic     Varicella     about age 3      Past Surgical History:   Procedure Laterality Date     PE TUBES       SURGICAL HISTORY OF -       arm surgery          Medications  Prior to Admission medications    Medication Sig Start Date End Date Taking? Authorizing Provider   hydrOXYzine (ATARAX) 25 MG tablet Take 1 tablet (25 mg) by mouth 3 times daily as needed for itching 6/21/21  Yes Toby Beaulieu MD   rosuvastatin (CRESTOR) 20 MG tablet Take 1 tablet (20 mg) by mouth daily 10/6/21  Yes Twyla Sterling APRN CNP       Allergies  Allergies   Allergen Reactions     Codeine      Hyper       Review of systems  A 10-point review of systems was negative    Examination  /73 (BP Location: Left arm, Patient Position: Sitting, Cuff Size: Adult Large)   Pulse 66   Temp 98.5  F (36.9  C) (Oral)   Ht 1.829 m (6')   Wt 108.4 kg (239 lb)   SpO2 98%   BMI 32.41 kg/m    Body mass index is 32.41 kg/m .    Gen- well, NAD, A+Ox3, normal color  Lym- no palpable LAD  CVS- RRR  RS- CTA  Abd- Mildly  obese.  Extr- hands normal, no MATTHEW  Skin- no rash or jaundice  Neuro- no asterixis  Psych- normal mood    Laboratory  Lab Results   Component Value Date     09/28/2021     02/13/2008    POTASSIUM 3.5 09/28/2021    POTASSIUM 3.6 02/13/2008    CHLORIDE 104 09/28/2021    CHLORIDE 101 02/13/2008    CO2 25 09/28/2021    CO2 28 02/13/2008    BUN 15 09/28/2021    BUN 7 02/13/2008    CR 1.10 09/28/2021    CR 0.50 02/13/2008       Lab Results   Component Value Date    BILITOTAL 1.9 09/28/2021     09/28/2021    AST 78 09/28/2021    ALKPHOS 67 09/28/2021       Lab Results   Component Value Date    ALBUMIN 4.7 09/28/2021    PROTTOTAL 8.5 09/28/2021        Lab Results   Component Value Date    WBC 6.5 02/14/2022    WBC 15.6 02/13/2008    HGB 15.6 02/14/2022    HGB 12.3 02/13/2008    MCV 89 02/14/2022    MCV 81 02/13/2008     02/14/2022     02/13/2008       No results found for: INR    Radiology    Assessment  21 year old male with multiple risk factors for HOANG and we initially saw him for abnormal liver function tests. Mr. Cota is very motivated and he was successful in losing 35 pounds in 3 months.  He also had his lipid profile normalize after he started Crestor.  He exercises 3 times per week.  We congratulated him on succeeding in weight loss and starting also a statin.  We also advised him that he  needs to follow up with his primary care to  monitor at least every months as him being on Crestor as this medication can also lead to abnormal liver function tests but never severe enough to cause cessation of the medication.    Plan  Await his liver function tests done this morning.  I agree starting the statin.  Congratulated him for his motivation and significant intentional weight loss.  He will be seen here in 6 months.    For all his other medical issues he will follow with his primary care physician.      I spent 30 minutes on this encounter of 2/14/2022  in chart reviewing, history  taking, physical examination and charting.  I also spent some time in counseling and coordination of care.    Carlota Heard MD  Hepatology  [facility]

## 2022-02-14 NOTE — LETTER
2/14/2022         RE: Jamari Cota  [address]  [facility] [address]        Dear Colleague,    Thank you for referring your patient, Jamari Cota, to the [facility] [place]. Please see a copy of my visit note below.    [facility] Hepatology    Follow-up Visit    Follow-up visit for HOANG    Referring provider:               Twyla Sterling    Subjective:  21 year old male with multiple risk factors for HOANG whom we have initially seen for abnormal liver function tests.  He had hyperlipidemia and was recently started on Crestor.  These have normalized since then.  He also loss intentionally 35 pounds in 3 months.    Patient denies jaundice, lower extremity edema, abdominal distension, lethargy or confusion.  Patient denies melena, hematemesis or hematochezia.  He denies any abdominal pain, nausea, vomiting or change in bowel habits.     Patient denies fevers, sweats or chills.  He has been vaccinated for the COVID-19 and has done the Pfizer first 2 doses and is scheduled to have his booster tomorrow.        Medical hx Surgical hx   Past Medical History:   Diagnosis Date     Fatty liver      Otitis media with effusion     chronic     Varicella     about age 3      Past Surgical History:   Procedure Laterality Date     PE TUBES       SURGICAL HISTORY OF -       arm surgery          Medications  Prior to Admission medications    Medication Sig Start Date End Date Taking? Authorizing Provider   hydrOXYzine (ATARAX) 25 MG tablet Take 1 tablet (25 mg) by mouth 3 times daily as needed for itching 6/21/21  Yes Toby Beaulieu MD   rosuvastatin (CRESTOR) 20 MG tablet Take 1 tablet (20 mg) by mouth daily 10/6/21  Yes Twyla Sterling APRN CNP       Allergies  Allergies   Allergen Reactions     Codeine      Hyper       Review of systems  A 10-point review of systems was negative    Examination  /73 (BP Location: Left arm, Patient Position: Sitting, Cuff Size:  Adult Large)   Pulse 66   Temp 98.5  F (36.9  C) (Oral)   Ht 1.829 m (6')   Wt 108.4 kg (239 lb)   SpO2 98%   BMI 32.41 kg/m    Body mass index is 32.41 kg/m .    Gen- well, NAD, A+Ox3, normal color  Lym- no palpable LAD  CVS- RRR  RS- CTA  Abd- Mildly obese.  Extr- hands normal, no MATTHEW  Skin- no rash or jaundice  Neuro- no asterixis  Psych- normal mood    Laboratory  Lab Results   Component Value Date     09/28/2021     02/13/2008    POTASSIUM 3.5 09/28/2021    POTASSIUM 3.6 02/13/2008    CHLORIDE 104 09/28/2021    CHLORIDE 101 02/13/2008    CO2 25 09/28/2021    CO2 28 02/13/2008    BUN 15 09/28/2021    BUN 7 02/13/2008    CR 1.10 09/28/2021    CR 0.50 02/13/2008       Lab Results   Component Value Date    BILITOTAL 1.9 09/28/2021     09/28/2021    AST 78 09/28/2021    ALKPHOS 67 09/28/2021       Lab Results   Component Value Date    ALBUMIN 4.7 09/28/2021    PROTTOTAL 8.5 09/28/2021        Lab Results   Component Value Date    WBC 6.5 02/14/2022    WBC 15.6 02/13/2008    HGB 15.6 02/14/2022    HGB 12.3 02/13/2008    MCV 89 02/14/2022    MCV 81 02/13/2008     02/14/2022     02/13/2008       No results found for: INR    Radiology    Assessment  21 year old male with multiple risk factors for HOANG and we initially saw him for abnormal liver function tests. Mr. Cota is very motivated and he was successful in losing 35 pounds in 3 months.  He also had his lipid profile normalize after he started Crestor.  He is very motivated and he exercises 3 times per week.  We congratulated him on succeeding in weight loss and starting also a statin.  We also advised him that he might need to get up with his primary monitor at least early months of him being on Crestor as this medication can also lead to abnormal liver function tests but never severe enough to cause cessation of the medication.    Plan  Await his liver function tests done this morning.  I agree starting the  statin.  Congratulated him for his motivation and significant intentional weight loss.  He will be seen here in 6 months.    For all his other medical issues he will follow with his primary care physician.      I spent 30 minutes on this encounter in chart reviewing, history taking, physical examination and charting.  I also spent some time in counseling and coordination of care.    Carlota Heard MD  Hepatology  [facility]      Again, thank you for allowing me to participate in the care of your patient.        Sincerely,        Carlota Heard MD

## 2022-02-14 NOTE — NURSING NOTE
Chief Complaint   Patient presents with     RECHECK     Mixed hyperlipidemia       Vitals:    02/14/22 0750   BP: 112/73   BP Location: Left arm   Patient Position: Sitting   Cuff Size: Adult Large   Pulse: 66   Temp: 98.5  F (36.9  C)   TempSrc: Oral   SpO2: 98%   Weight: 108.4 kg (239 lb)   Height: 1.829 m (6')       Body mass index is 32.41 kg/m .    Misti Mars MA

## 2022-02-19 ENCOUNTER — HEALTH MAINTENANCE LETTER (OUTPATIENT)
Age: 22
End: 2022-02-19

## 2022-02-21 ENCOUNTER — VIRTUAL VISIT (OUTPATIENT)
Dept: BEHAVIORAL HEALTH | Facility: HOSPITAL | Age: 22
End: 2022-02-21
Payer: COMMERCIAL

## 2022-02-21 DIAGNOSIS — F43.23 ADJUSTMENT DISORDER WITH MIXED ANXIETY AND DEPRESSED MOOD: Primary | ICD-10-CM

## 2022-02-21 PROCEDURE — 90834 PSYTX W PT 45 MINUTES: CPT | Mod: GT,95 | Performed by: SOCIAL WORKER

## 2022-02-21 ASSESSMENT — ANXIETY QUESTIONNAIRES
7. FEELING AFRAID AS IF SOMETHING AWFUL MIGHT HAPPEN: SEVERAL DAYS
2. NOT BEING ABLE TO STOP OR CONTROL WORRYING: SEVERAL DAYS
6. BECOMING EASILY ANNOYED OR IRRITABLE: NOT AT ALL
GAD7 TOTAL SCORE: 8
5. BEING SO RESTLESS THAT IT IS HARD TO SIT STILL: MORE THAN HALF THE DAYS
1. FEELING NERVOUS, ANXIOUS, OR ON EDGE: MORE THAN HALF THE DAYS
3. WORRYING TOO MUCH ABOUT DIFFERENT THINGS: SEVERAL DAYS
IF YOU CHECKED OFF ANY PROBLEMS ON THIS QUESTIONNAIRE, HOW DIFFICULT HAVE THESE PROBLEMS MADE IT FOR YOU TO DO YOUR WORK, TAKE CARE OF THINGS AT HOME, OR GET ALONG WITH OTHER PEOPLE: SOMEWHAT DIFFICULT

## 2022-02-21 ASSESSMENT — PATIENT HEALTH QUESTIONNAIRE - PHQ9
SUM OF ALL RESPONSES TO PHQ QUESTIONS 1-9: 6
5. POOR APPETITE OR OVEREATING: SEVERAL DAYS

## 2022-02-21 NOTE — PROGRESS NOTES
Discharge Summary  Multiple Sessions    Client Name: Jamari Cota MRN#: [id number] YOB: 2000    Discharge Date:   February 21, 2022    Service Modality: Video Visit:      Provider verified identity through the following two step process.  Patient provided:  Patient is known previously to provider    Telemedicine Visit: The patient's condition can be safely assessed and treated via synchronous audio and visual telemedicine encounter.      Reason for Telemedicine Visit: Services only offered telehealth    Originating Site (Patient Location): Patient's home    Distant Site (Provider Location): Provider Remote Setting    Consent:  The patient/guardian has verbally consented to: the potential risks and benefits of telemedicine (video visit) versus in person care; bill my insurance or make self-payment for services provided; and responsibility for payment of non-covered services.     Patient would like the video invitation sent by:  My Chart    Mode of Communication:  Video Conference via [facility]    As the provider I attest to compliance with applicable laws and regulations related to telemedicine.    Service Type: Individual      Session Start Time: 12:03  Session End Time: 12:55      Session Length: 52     Session #:5     Attendees: Client    Focus of Treatment Objective(s):  Client's presenting concerns included: Adjustment Difficulties related to: family concerns and personal issues as well as career need  Details about patient's initial presenting concerns are found in his DA completed on 12/14/2021     Stage of Change at time of Discharge: ACTION (Actively working towards change); had developed understanding of his needs needs, feelings and thoughts that they generate. His level of confidence about where he is going in life is at 5/10. He hope to increase this rate in coming sessions with a different provider    Medication Adherence:  Yes    Chemical  Use:  Yes    Assessment: Current Emotional / Mental Status (status of significant symptoms):  Risk status (Self / Other harm or suicidal ideation)  Client denies current fears or concerns for personal safety.  Client denies current or recent suicidal ideation or behaviors.  Client denies current or recent homicidal ideation or behaviors.  Client denies current or recent self injurious behavior or ideation.  Client denies other safety concerns.    Appearance:   Appropriate   Eye Contact:   Good   Psychomotor Behavior: Normal   Attitude:   Cooperative   Orientation:   Person Place Time Situation  Speech   Rate / Production: Normal/ Responsive   Volume:  Normal   Mood:    Normal  Affect:    Appropriate   Thought Content:  Clear   Thought Form:  Coherent  Logical   Insight:   Good     DSM5 Diagnoses: (Sustained by DSM5 Criteria Listed Above)  Diagnoses: Adjustment Disorders  309.28 (F43.23) With mixed anxiety and depressed mood  Psychosocial & Contextual Factors: As reported during the assessment and therapy sessions, patient had identified there is much going on in his life: That includes recent loss of his maternal grand father about 6 months ago. They were very close. Step father's health declining due to stage 4 lung cancer for the last 5/6 years. 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  Was told he has a liver disease which changed his life styles. He no longer drinks which limits his socialization He is to lose up to lbs 70. Was initially told he has to lose it by March this year. Though he is comfortable with the pace he has; has lost Lbs 40 so far which is now to see major change in life so far. Recent labs show an incredible improvement with his cholesterol and liver. This eases his anxiety and brings more confidence in him. Patient broke up with his girl friend on 1/15/2020  after 4 years in relationship. This also affected him. Patient has some concerns about coming out to mother and the rest of the family about being bisexual/singh.Step little sister(15) is having some issues due to seeing father ill all the time.    WHODAS 2.0 (12 item) Score:  Not completed today  PROMIS 10: 31  Reason for Discharge:  Referred to Reji Stein [facility]. This was a plan with the patient to see someone specialized in some of the concerns discussed in sessions. These include, preparing for coming out to the family and friends.  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 Patient was given information of his next provider.     Aftercare Plan:  Patient will be working with a different provider within the clinic.     AWA Amado                                           February 21, 2022

## 2022-02-22 ASSESSMENT — ANXIETY QUESTIONNAIRES: GAD7 TOTAL SCORE: 8

## 2022-03-04 ENCOUNTER — VIRTUAL VISIT (OUTPATIENT)
Dept: BEHAVIORAL HEALTH | Facility: HOSPITAL | Age: 22
End: 2022-03-04
Payer: COMMERCIAL

## 2022-03-04 DIAGNOSIS — F43.23 ADJUSTMENT DISORDER WITH MIXED ANXIETY AND DEPRESSED MOOD: Primary | ICD-10-CM

## 2022-03-04 PROCEDURE — 90834 PSYTX W PT 45 MINUTES: CPT | Mod: GT,95 | Performed by: COUNSELOR

## 2022-03-04 NOTE — PROGRESS NOTES
[facility]                                     Progress Note    Patient Name: Jamari Cota  Date: 3/04/22         Service Type: Individual      Session Start Time: 11:00 am  Session End Time: 11:52 am     Session Length: 52 minutes    Session #: 1    Attendees: Client attended alone    Service Modality:  Video Visit:      Provider verified identity through the following two step process.  Patient provided:  Patient  and Patient address    Telemedicine Visit: The patient's condition can be safely assessed and treated via synchronous audio and visual telemedicine encounter.      Reason for Telemedicine Visit: Services only offered telehealth    Originating Site (Patient Location): Patient's home    Distant Site (Provider Location): Provider Remote Setting- Home Office    Consent:  The patient/guardian has verbally consented to: the potential risks and benefits of telemedicine (video visit) versus in person care; bill my insurance or make self-payment for services provided; and responsibility for payment of non-covered services.     Patient would like the video invitation sent by:  My Chart    Mode of Communication:  Video Conference via [facility]    As the provider I attest to compliance with applicable laws and regulations related to telemedicine.    DATA  Interactive Complexity: No  Crisis: No        Progress Since Last Session (Related to Symptoms / Goals / Homework):   Symptoms: Improving symptoms, pt is feeling his symptoms are more managable.    Homework: First session, pt had no prior homework to complete.      Episode of Care Goals: Achieved / completed to satisfaction - PREPARATION (Decided to change - considering how); Intervened by negotiating a change plan and determining options / strategies for behavior change, identifying triggers, exploring social supports, and working towards setting a date to begin behavior change     Current / Ongoing Stressors and Concerns:    Pt is  currently seeking therapy related to depression and anxiety around self-exploration related to his sexual identity (bi-sexual, possibly homosexual). Pt grew up in a very conservative Protestant home, where same-sex relations were not supported, which has caused him to have an internal struggle between perceived expectations and his desire to be authentic. Pt feels like he can't be his authentic self or safely explore/express his sexual identity to his family, due to uncertainty about how they will react; pt assumes it will not be supportive. Pt's father current is dying from cancer, and pt's grandfather  in the past year, which has been a stressor for him. Pt reported that recently he has been having nightmares about how his grandfather would respond to learning pt is not straight and this is upsetting to pt. Pt has a limited [place] of friends who are somewhat accepting of his sexual identity, but are not very supportive overall. Pt did state that he recently started dating someone, and that while it is not the best timing, it has been a good experience so far. Pt does not have any connections to the LGBT community and has some lingering shame around his sexual identity, which he is still trying to figure out. Pt is hopeful for the future, but feels kind of lost at this time.     Treatment Objective(s) Addressed in This Session:   identify and compliment positives at least 3 times daily to support positive self-image  identify current thoughts, triggers, and stressors which contribute to feelings of depression  identify unhelpful or unrealistic fears / thoughts that contribute to feeling anxious  Decrease frequency and intensity of feeling down, depressed, hopeless  Identify negative self-talk and behaviors: challenge core beliefs, myths, and actions  identify coping strategies for improving mood  identify two areas of life that you would like to have improved functioning  Develop trx  plan     Intervention:   CBT: Reviewed the triangle    Motivational Interviewing    MI Intervention: Co-Developed Goal: trx plan, Expressed Empathy/Understanding, Supported Autonomy, Collaboration, Evocation, Open-ended questions, Reflections: simple and complex, Change talk (evoked) and Reframe     Change Talk Expressed by the Patient: Desire to change Ability to change Reasons to change Committment to change    Provider Response to Change Talk: E - Evoked more info from patient about behavior change, A - Affirmed patient's thoughts, decisions, or attempts at behavior change, R - Reflected patient's change talk and S - Summarized patient's change talk statements    Psychodynamic: Processed internal-experiences related to sexual identity and family expectations  Solution Focused: Identified immediate areas pt would like to be more successful in and discussed potential strategies to overcome barriers to success.    Assessments completed prior to visit:  PHQ2:   PHQ-2 ( 1999 Pfizer) 12/14/2021 8/4/2016   Q1: Little interest or pleasure in doing things 1 0   Q2: Feeling down, depressed or hopeless 1 0   PHQ-2 Score 2 0   Q1: Little interest or pleasure in doing things Several days -   Q2: Feeling down, depressed or hopeless Several days -   PHQ-2 Score 2 -     PHQ9:   PHQ-9 SCORE 7/13/2015 12/3/2021 1/24/2022 2/21/2022   PHQ-9 Total Score 17 - - -   PHQ-9 Total Score MyChart - 6 (Mild depression) - -   PHQ-9 Total Score - 6 8 6     GAD2:   MILAD-2 12/14/2021   Feeling nervous, anxious, or on edge 1   Not being able to stop or control worrying 1   MILAD-2 Total Score 2     GAD7:   MILAD-7 SCORE 12/3/2021 1/24/2022 2/21/2022   Total Score 5 (mild anxiety) - -   Total Score 5 7 8     PROMIS 10-Global Health (only subscores and total score):   PROMIS-10 Scores Only 12/14/2021 2/21/2022   Global Mental Health Score 10 14   Global Physical Health Score 14 17   PROMIS TOTAL - SUBSCORES 24 31         ASSESSMENT: Current Emotional /  Mental Status (status of significant symptoms):   Risk status (Self / Other harm or suicidal ideation)   Patient denies current fears or concerns for personal safety.   Patient denies current or recent suicidal ideation or behaviors.   Patient denies current or recent homicidal ideation or behaviors.   Patient denies current or recent self injurious behavior or ideation.   Patient denies other safety concerns.   Patient reports there has been no change in risk factors since their last session.     Patient reports there has been no change in protective factors since their last session.     Recommended that patient call 911 or go to the local ED should there be a change in any of these risk factors.     Appearance:   Appropriate    Eye Contact:   Good    Psychomotor Behavior: Normal    Attitude:   Cooperative  Interested Friendly Pleasant   Orientation:   All   Speech    Rate / Production: Normal/ Responsive    Volume:  Normal    Mood:    Anxious  Normal   Affect:    Appropriate    Thought Content:  Clear    Thought Form:  Coherent  Logical    Insight:    Fair  and Intellectual Insight     Medication Review:   No changes to current psychiatric medication(s)     Medication Compliance:   Yes     Changes in Health Issues:   None reported     Chemical Use Review:   Substance Use: Chemical use reviewed, no active concerns identified      Tobacco Use: No current tobacco use.      Diagnosis:  1. Adjustment disorder with mixed anxiety and depressed mood        Collateral Reports Completed:   Not Applicable    PLAN: (Patient Tasks / Therapist Tasks / Other)  Write down current coping skills  Make list of 5 potential jobs or job fields you may want to go into  Continue practice the three C's  Look up LGBT people who are surprising to pt, and bring 3 examples    Keaton Stein [facility], Provider Oversight:  Dr. Ye Thornton                                                            ______________________________________________________________________    Individual Treatment Plan    Patient's Name: Jamari Cota  YOB: 2000    Date of Creation: 3/4/22  Date Treatment Plan Last Reviewed/Revised: 3/4/22    DSM5 Diagnoses: Adjustment Disorders  309.28 (F43.23) With mixed anxiety and depressed mood  Psychosocial / Contextual Factors: concerns about acceptance by family, pt has started dating, financial stressors  PROMIS (reviewed every 90 days):   PROMIS-10 Scores             31    Referral / Collaboration:  Referral to another professional/service is not indicated at this time..    Anticipated number of session for this episode of care: Pt will engage in bi-weekly therapy  Anticipation frequency of session: Biweekly  Anticipated Duration of each session: 38-52 minutes  Treatment plan will be reviewed in 90 days or when goals have been changed.       MeasurableTreatment Goal(s) related to diagnosis / functional impairment(s)  Goal 1: Patient will develop helpful coping skills, while learning to recognize unhelpful ones, and increase/expand his support system.     I will know I've met my goal when I am more able to feel more confident in myself and decisions/presetation.     Objective #A (Patient Action)    Patient will identify helpful vs unhelpful coping strategies to more effectively address stressors  exercise for 30 minutes 3 times per week for 30 minutes  Decrease frequency and intensity of feeling down, depressed, hopeless  Improve diet, appetite, mindful eating, and / or meal planning  Identify negative self-talk and behaviors: challenge core beliefs, myths, and actions  identify 5 coping strategies for improving mood  practice one mindfulness skill each day for 5-10 minutes.  Status: New - Date: 3/4/22     Intervention(s)  Therapist will assign homework related to target objective with the intent to promote pt autonomy and better manage MH.  Facilitate increase in  "self-awareness  Provide psycho-education on mindfulness and helpful vs. unhelpful coping identification  Teach/promote utilization of coping skill development/exploration and mindfulness techniques    Objective #B Pt will learn how to develop and identify a helpful support system.  Patient will participate in social or interpersonal activities to improve mood  attend and participate in social or recreational activities to expand social network/support  Increase interest, engagement, and pleasure in doing things  Identify negative self-talk and behaviors: challenge core beliefs, myths, and actions  track and record at least 3 pleasant exchanges with friends and new people.  Status: New - Date: 3/4/22     Intervention(s)  Therapist will assign homework related to target objective with the intent to promote pt autonomy and better manage MH.  Facilitate increase in self-awareness  Provide psycho-education on social distortions/fallacies and boundaries  Teach/promote utilization of social engagement skills and problem solving    Goal 2: Patient will be more able to engage socially and feel less anxious when interacting in a group setting.    I will know I've met my goal when I am able to be less stressed/anxious in social interactions, especially in group settings/activities.      Objective #A (Patient Action)    Status: New - Date: 3/4/22     Patient will identify and compliment positives at least 3 times daily to support positive self-image  identify unhelpful or unrealistic thoughts/concerns and stressors which contribute to feelings of anxiety  learn and demonstrate pro-social and communication assertiveness skill(s)  initiate 3 social contact(s) per day for increasing lengths of time  practice setting boundaries 3 times in the next several weeks  accept compliment with a \"thank you\" and no self deprecating comments.    Intervention(s)  Therapist will assign homework related to target objective with the intent to " promote pt autonomy and better manage MH.  Facilitate increase in self-awareness  Provide psycho-education on social anxiety thinking and communication styles  Teach/promote utilization of thought challenging and self-confidence building    Goal 3: Patient will work on processing through self-identity and how to express his authentic or genuine self.     I will know I've met my goal when I feel more comfortable being my authentic or genuine self.      Objective #A (Patient Action)    Status: New - Date: 3/4/22     Patient will identify communication strategies to more effectively address stressors  use thought-stopping strategy daily to reduce intrusive thoughts  Identify negative self-talk and behaviors: challenge core beliefs, myths, and actions  identify how the use of substances contributes to the avoidance of feeling associated with the loss  identify the time in your life when you began to feel poorly about themselves.  Use positive self-affirmation daily to promote self-acceptance.    Intervention(s)  Therapist will assign homework related to target objective with the intent to promote pt autonomy and better manage MH.  Facilitate increase in self-awareness  Provide psycho-education on self-compassion and anticipatory grief and avoidance  Teach/promote utilization of self-care/acceptance and processing through internal-experiences    Patient has reviewed and agreed to the above plan.      Keaton Stein  [facility]  March 4, 2022

## 2022-03-18 ENCOUNTER — VIRTUAL VISIT (OUTPATIENT)
Dept: BEHAVIORAL HEALTH | Facility: HOSPITAL | Age: 22
End: 2022-03-18
Payer: COMMERCIAL

## 2022-03-18 DIAGNOSIS — F43.23 ADJUSTMENT DISORDER WITH MIXED ANXIETY AND DEPRESSED MOOD: Primary | ICD-10-CM

## 2022-03-18 PROCEDURE — 90834 PSYTX W PT 45 MINUTES: CPT | Mod: GT,95 | Performed by: COUNSELOR

## 2022-03-18 NOTE — PROGRESS NOTES
[facility]                                     Progress Note    Patient Name: Jamari Cota  Date: 3/18/22         Service Type: Individual      Session Start Time: 11:01 am  Session End Time: 11:51 am     Session Length: 51 minutes    Session #: 2    Attendees: Client attended alone    Service Modality:  Video Visit:      Provider verified identity through the following two step process.  Patient provided:  Patient is known previously to provider    Telemedicine Visit: The patient's condition can be safely assessed and treated via synchronous audio and visual telemedicine encounter.      Reason for Telemedicine Visit: Services only offered telehealth    Originating Site (Patient Location): Patient's place of employment    Distant Site (Provider Location): Provider Remote Setting- Home Office    Consent:  The patient/guardian has verbally consented to: the potential risks and benefits of telemedicine (video visit) versus in person care; bill my insurance or make self-payment for services provided; and responsibility for payment of non-covered services.     Patient would like the video invitation sent by:  My Chart    Mode of Communication:  Video Conference via [employer]    As the provider I attest to compliance with applicable laws and regulations related to telemedicine.    DATA  Interactive Complexity: No  Crisis: No        Progress Since Last Session (Related to Symptoms / Goals / Homework):   Symptoms: Worsening symptoms. Pt endorses an increase in overall anxiety.    Homework: Partially completed      Episode of Care Goals: Achieved / completed to satisfaction - PREPARATION (Decided to change - considering how); Intervened by negotiating a change plan and determining options / strategies for behavior change, identifying triggers, exploring social supports, and working towards setting a date to begin behavior change     Current / Ongoing Stressors and Concerns:   Pt is currently seeking  therapy related to depression and anxiety around self-exploration related to his sexual identity (bi-sexual, possibly homosexual), and has some lingering shame around his sexual identity, which he is still trying to figure out. Pt grew up in a very conservative Christianity home, where same-sex relations were not supported, which has caused him to have an internal struggle between perceived expectations and his desire to be authentic. Since last session, pt reported going on three dates with a zoraida, and reconnecting with a number of people from his past (highschool/college). Pt states that reconnecting with old and new people has been anxiety provoking but mostly positive. Pt did identify that one of these interactions went really well and is looking to potentially connect with them on an ongoing basis, as he came out to her and it was a good/empowering experience. Pt did reflect on his dating situation and feels that they aren't connecting on the same level, so is likely going to end it or just stay friends. Pt did process through where he is dating wise in general a bit as well. Pt reported feeling he made a mistake as he overspent on a PS5 for his step-dad as his health is declining, and processed through his feelings related to this. Pt expressed feeling guilty over spending money in general, and concerns about not feeling selfless in his decision, which was reinforcing his feelings of guilt. Pt was able to recognize how his feelings did not line up with his actions and re-framing was discussed as a potential tool for him to use. Pt acknowledges that he has been living with guilt around a lot of things throughout his life and it is hard for him to let that feeling go.      For next time, talk about staying healthy and habit forming skills, and wanting to not use delta 8 to manage anxiety (helpful vs. Unhelpful coping skills)     Treatment Objective(s) Addressed in This Session:   identify current thoughts, triggers,  and stressors which contribute to feelings of depression  identify unhelpful or unrealistic fears / thoughts that contribute to feeling anxious  Decrease frequency and intensity of feeling down, depressed, hopeless  Identify negative self-talk and behaviors: challenge core beliefs, myths, and actions  identify coping strategies for improving mood  identify two areas of life that you would like to have improved functioning     Intervention:   CBT: Discussed how feelings aren't facts and that reframing can help use thoughts to modify emotions    Motivational Interviewing    MI Intervention: Expressed Empathy/Understanding, Supported Autonomy, Collaboration, Evocation, Open-ended questions, Reflections: simple and complex and Reframe     Change Talk Expressed by the Patient: Desire to change Ability to change Reasons to change Taking steps    Provider Response to Change Talk: E - Evoked more info from patient about behavior change, A - Affirmed patient's thoughts, decisions, or attempts at behavior change, R - Reflected patient's change talk and S - Summarized patient's change talk statements    Psychodynamic: Processed internal-experiences related to sexual identity and family expectations  Solution Focused: Identified immediate areas pt would like to be more successful in and discussed potential strategies to overcome barriers to success.    Assessments completed prior to visit:  PHQ2:   PHQ-2 ( 1999 [employer]) 3/18/2022 12/14/2021 8/4/2016   Q1: Little interest or pleasure in doing things 1 1 0   Q2: Feeling down, depressed or hopeless 1 1 0   PHQ-2 Score 2 2 0   Q1: Little interest or pleasure in doing things Several days Several days -   Q2: Feeling down, depressed or hopeless Several days Several days -   PHQ-2 Score 2 2 -     PHQ9:   PHQ-9 SCORE 7/13/2015 12/3/2021 1/24/2022 2/21/2022   PHQ-9 Total Score 17 - - -   PHQ-9 Total Score MyChart - 6 (Mild depression) - -   PHQ-9 Total Score - 6 8 6     GAD2:   MILAD-2  12/14/2021 3/18/2022   Feeling nervous, anxious, or on edge 1 1   Not being able to stop or control worrying 1 1   MILAD-2 Total Score 2 2     GAD7:   MILAD-7 SCORE 12/3/2021 1/24/2022 2/21/2022   Total Score 5 (mild anxiety) - -   Total Score 5 7 8     PROMIS 10-Global Health (only subscores and total score):   PROMIS-10 Scores Only 12/14/2021 2/21/2022 3/18/2022   Global Mental Health Score 10 14 11   Global Physical Health Score 14 17 16   PROMIS TOTAL - SUBSCORES 24 31 27         ASSESSMENT: Current Emotional / Mental Status (status of significant symptoms):   Risk status (Self / Other harm or suicidal ideation)   Patient denies current fears or concerns for personal safety.   Patient denies current or recent suicidal ideation or behaviors.   Patient denies current or recent homicidal ideation or behaviors.   Patient denies current or recent self injurious behavior or ideation.   Patient denies other safety concerns.   Patient reports there has been no change in risk factors since their last session.     Patient reports there has been no change in protective factors since their last session.     Recommended that patient call 911 or go to the local ED should there be a change in any of these risk factors.     Appearance:   Appropriate    Eye Contact:   Good    Psychomotor Behavior: Normal    Attitude:   Cooperative  Interested Friendly Pleasant   Orientation:   All   Speech    Rate / Production: Normal/ Responsive    Volume:  Normal    Mood:    Anxious  Normal   Affect:    Appropriate  Blunted    Thought Content:  Clear    Thought Form:  Coherent  Logical    Insight:    Fair  and Intellectual Insight     Medication Review:   No changes to current psychiatric medication(s)     Medication Compliance:   Yes     Changes in Health Issues:   None reported     Chemical Use Review:   Substance Use: Chemical use reviewed, no active concerns identified      Tobacco Use: No current tobacco use.      Diagnosis:  1. Adjustment  disorder with mixed anxiety and depressed mood        Collateral Reports Completed:   Not Applicable    PLAN: (Patient Tasks / Therapist Tasks / Other)  Try to let go of guilt related to purchase  Reach out to friend again if they don't before next session  Attempt to use reframing     Check on next time:  Write down current coping skills  Make list of 5 potential jobs or job fields you may want to go into  Look up LGBT people who are surprising to pt, and bring 3 examples    Keaton Stein [facility], Provider Oversight:  Dr. Ye Thornton                                                           ______________________________________________________________________    Individual Treatment Plan    Patient's Name: Jamari Cota  YOB: 2000    Date of Creation: 3/4/22  Date Treatment Plan Last Reviewed/Revised: 3/4/22    DSM5 Diagnoses: Adjustment Disorders  309.28 (F43.23) With mixed anxiety and depressed mood  Psychosocial / Contextual Factors: concerns about acceptance by family, pt has started dating, financial stressors  PROMIS (reviewed every 90 days):   PROMIS-10 Scores  Global Mental Health Score: (P) 11  Global Physical Health Score: (P) 16   PROMIS TOTAL - SUBSCORES: (P) 27   31    Referral / Collaboration:  Referral to another professional/service is not indicated at this time..    Anticipated number of session for this episode of care: 8-13  Anticipation frequency of session: Biweekly  Anticipated Duration of each session: 38-52 minutes  Treatment plan will be reviewed in 90 days or when goals have been changed.       MeasurableTreatment Goal(s) related to diagnosis / functional impairment(s)  Goal 1: Patient will develop helpful coping skills, while learning to recognize unhelpful ones, and increase/expand his support system.     I will know I've met my goal when I am more able to feel more confident in myself and decisions/presetation.     Objective #A (Patient Action)    Patient will identify  helpful vs unhelpful coping strategies to more effectively address stressors  exercise for 30 minutes 3 times per week for 30 minutes  Decrease frequency and intensity of feeling down, depressed, hopeless  Improve diet, appetite, mindful eating, and / or meal planning  Identify negative self-talk and behaviors: challenge core beliefs, myths, and actions  identify 5 coping strategies for improving mood  practice one mindfulness skill each day for 5-10 minutes.  Status: New - Date: 3/4/22     Intervention(s)  Therapist will assign homework related to target objective with the intent to promote pt autonomy and better manage MH.  Facilitate increase in self-awareness  Provide psycho-education on mindfulness and helpful vs. unhelpful coping identification  Teach/promote utilization of coping skill development/exploration and mindfulness techniques    Objective #B Pt will learn how to develop and identify a helpful support system.  Patient will participate in social or interpersonal activities to improve mood  attend and participate in social or recreational activities to expand social network/support  Increase interest, engagement, and pleasure in doing things  Identify negative self-talk and behaviors: challenge core beliefs, myths, and actions  track and record at least 3 pleasant exchanges with friends and new people.  Status: New - Date: 3/4/22     Intervention(s)  Therapist will assign homework related to target objective with the intent to promote pt autonomy and better manage MH.  Facilitate increase in self-awareness  Provide psycho-education on social distortions/fallacies and boundaries  Teach/promote utilization of social engagement skills and problem solving    Goal 2: Patient will be more able to engage socially and feel less anxious when interacting in a group setting.    I will know I've met my goal when I am able to be less stressed/anxious in social interactions, especially in group settings/activities.   "    Objective #A (Patient Action)    Status: New - Date: 3/4/22     Patient will identify and compliment positives at least 3 times daily to support positive self-image  identify unhelpful or unrealistic thoughts/concerns and stressors which contribute to feelings of anxiety  learn and demonstrate pro-social and communication assertiveness skill(s)  initiate 3 social contact(s) per day for increasing lengths of time  practice setting boundaries 3 times in the next several weeks  accept compliment with a \"thank you\" and no self deprecating comments.    Intervention(s)  Therapist will assign homework related to target objective with the intent to promote pt autonomy and better manage MH.  Facilitate increase in self-awareness  Provide psycho-education on social anxiety thinking and communication styles  Teach/promote utilization of thought challenging and self-confidence building    Goal 3: Patient will work on processing through self-identity and how to express his authentic or genuine self.     I will know I've met my goal when I feel more comfortable being my authentic or genuine self.      Objective #A (Patient Action)    Status: New - Date: 3/4/22     Patient will identify communication strategies to more effectively address stressors  use thought-stopping strategy daily to reduce intrusive thoughts  Identify negative self-talk and behaviors: challenge core beliefs, myths, and actions  identify how the use of substances contributes to the avoidance of feeling associated with the loss  identify the time in your life when you began to feel poorly about themselves.  Use positive self-affirmation daily to promote self-acceptance.    Intervention(s)  Therapist will assign homework related to target objective with the intent to promote pt autonomy and better manage MH.  Facilitate increase in self-awareness  Provide psycho-education on self-compassion and anticipatory grief and avoidance  Teach/promote utilization of " self-care/acceptance and processing through internal-experiences    Patient has reviewed and agreed to the above plan.      Keaton Stein  [facility]  March 4, 2022

## 2022-04-01 ENCOUNTER — VIRTUAL VISIT (OUTPATIENT)
Dept: BEHAVIORAL HEALTH | Facility: HOSPITAL | Age: 22
End: 2022-04-01
Payer: COMMERCIAL

## 2022-04-01 DIAGNOSIS — F43.23 ADJUSTMENT DISORDER WITH MIXED ANXIETY AND DEPRESSED MOOD: Primary | ICD-10-CM

## 2022-04-01 PROCEDURE — 90834 PSYTX W PT 45 MINUTES: CPT | Mod: GT,95 | Performed by: COUNSELOR

## 2022-04-01 NOTE — PROGRESS NOTES
[facility]                                     Progress Note    Patient Name: Jamari Cota  Date: 4/01/22         Service Type: Individual      Session Start Time: 11:15 am  Session End Time: 12:00 pm     Session Length: 45 minutes    Session #: 3    Attendees: Client attended alone    Service Modality:  Video Visit:      Provider verified identity through the following two step process.  Patient provided:  Patient is known previously to provider    Telemedicine Visit: The patient's condition can be safely assessed and treated via synchronous audio and visual telemedicine encounter.      Reason for Telemedicine Visit: Services only offered telehealth    Originating Site (Patient Location): Patient's place of employment    Distant Site (Provider Location): Provider Remote Setting- Home Office    Consent:  The patient/guardian has verbally consented to: the potential risks and benefits of telemedicine (video visit) versus in person care; bill my insurance or make self-payment for services provided; and responsibility for payment of non-covered services.     Patient would like the video invitation sent by:  My Chart    Mode of Communication:  Video Conference via [employer]    As the provider I attest to compliance with applicable laws and regulations related to telemedicine.    DATA  Interactive Complexity: No  Crisis: No        Progress Since Last Session (Related to Symptoms / Goals / Homework):   Symptoms: Improving symptoms, pt expressed feeling more motivated and less overwhelmed    Homework: Achieved / completed to satisfaction      Episode of Care Goals: Satisfactory progress - ACTION (Actively working towards change); Intervened by reinforcing change plan / affirming steps taken     Current / Ongoing Stressors and Concerns:   Pt is currently seeking therapy related to depression and anxiety around self-exploration related to his sexual identity (bi-sexual, possibly homosexual), and has  some lingering shame around his sexual identity, which he is still trying to figure out. Pt grew up in a very conservative [employer] home, where same-sex relations were not supported, which has caused him to have an internal struggle between perceived expectations and his desire to be authentic. Pt was late to session so it started later then intended, he had missed his notification alarm. Since last session, pt reported that he was super sick this past weekend, but otherwise has been trying new things. Pt went to a concert for the first time with a friend, which was fun, and signed up to start a game with a new group online. Pt has also been working on writing down tasks and picking one thing to do to help him feel less overwhelmed, and get something done. Pt processed through his homework from two sessions ago and reflected on current barriers he has identified to his success. Pt recognized that a lot of his difficulties come from feeling overwhelmed and anxious. Pt discussed his coping skills and identified helpful vs unhelpful ones that he engages in. A potential issue pt identified as a challening not helpful coping mechanism: stops doing active things and instead engages in inactive zoning activities (canceling things (avoidance) that take effort feels good as it helps alleviate feeling overwhelmed, including canceling things he enjoys). Pt expressed that his guilt feelings from last time are diminishing and that reframing has been helping. Upon reflection over the past couple weeks pt thinks he feels emotions big initially and then they drop off over time and with reflection on them. CORE beliefs were discussed as a possible component of this. Pt expressed identifying more with the label he recently learned, Demisexual, which was discussed briefly; pt did admit that this may change as he learns more and does more self-exploration. Sexuality as a spectrum was also briefly discussed and that it can be  refined/changed as we learn more about ourselves. Pt identified that work had been somewhat difficult recently, with angry customers and pt being unsure how to cope with this well.    Current coping skills-  Music  Breathing technique  D8 pen (vape) - unhelpful  Eating - unhelpful  Socializing  Reading to escape (games books dnd) - unhelpful in excess   exerciseing  Singing (car singing)  Staying busy at work a lot with low-priority or off tasks - unhelpful?    Look up LGBT people who are surprising to pt, and bring 3 examples - openly singh [employer] member, music artists, [employer] youtube channel: middle ground   Make list of 5 potential jobs or job fields you may want to go into - doesn't feel he has the confidence to apply for some jobs due to internalizing some feelings of past failure.     For next time, talk about staying healthy and habit forming skills, and wanting to not use delta 8 to manage anxiety (helpful vs. Unhelpful coping skills)     Treatment Objective(s) Addressed in This Session:   identify current thoughts, triggers, and stressors which contribute to feelings of depression  identify unhelpful or unrealistic fears / thoughts that contribute to feeling anxious  Decrease frequency and intensity of feeling down, depressed, hopeless  Identify negative self-talk and behaviors: challenge core beliefs, myths, and actions  identify coping strategies for improving mood  identify two areas of life that you would like to have improved functioning     Intervention:   CBT: Discussed how feelings aren't facts and that reframing can help use thoughts to modify emotions    Motivational Interviewing    MI Intervention: Expressed Empathy/Understanding, Supported Autonomy, Collaboration, Evocation, Permission to raise concern or advise, Open-ended questions and Reflections: simple and complex     Change Talk Expressed by the Patient: Ability to change Reasons to change Taking steps    Provider Response to Change Talk: E -  Evoked more info from patient about behavior change, A - Affirmed patient's thoughts, decisions, or attempts at behavior change, R - Reflected patient's change talk and S - Summarized patient's change talk statements    Psychodynamic: Processed internal-experiences related to sexual identity and family expectations  Solution Focused: Identified immediate areas pt would like to be more successful in and discussed potential strategies to overcome barriers to success.    Assessments completed prior to visit:  PHQ2:   PHQ-2 ( 1999 [employer]) 3/18/2022 12/14/2021 8/4/2016   Q1: Little interest or pleasure in doing things 1 1 0   Q2: Feeling down, depressed or hopeless 1 1 0   PHQ-2 Score 2 2 0   Q1: Little interest or pleasure in doing things Several days Several days -   Q2: Feeling down, depressed or hopeless Several days Several days -   PHQ-2 Score 2 2 -     PHQ9:   PHQ-9 SCORE 7/13/2015 12/3/2021 1/24/2022 2/21/2022   PHQ-9 Total Score 17 - - -   PHQ-9 Total Score MyChart - 6 (Mild depression) - -   PHQ-9 Total Score - 6 8 6     GAD2:   MILAD-2 12/14/2021 3/18/2022   Feeling nervous, anxious, or on edge 1 1   Not being able to stop or control worrying 1 1   MILAD-2 Total Score 2 2     GAD7:   MILAD-7 SCORE 12/3/2021 1/24/2022 2/21/2022   Total Score 5 (mild anxiety) - -   Total Score 5 7 8     PROMIS 10-Global Health (only subscores and total score):   PROMIS-10 Scores Only 12/14/2021 2/21/2022 3/18/2022   Global Mental Health Score 10 14 11   Global Physical Health Score 14 17 16   PROMIS TOTAL - SUBSCORES 24 31 27         ASSESSMENT: Current Emotional / Mental Status (status of significant symptoms):   Risk status (Self / Other harm or suicidal ideation)   Patient denies current fears or concerns for personal safety.   Patient denies current or recent suicidal ideation or behaviors.   Patient denies current or recent homicidal ideation or behaviors.   Patient denies current or recent self injurious behavior or  ideation.   Patient denies other safety concerns.   Patient reports there has been no change in risk factors since their last session.     Patient reports there has been no change in protective factors since their last session.     Recommended that patient call 911 or go to the local ED should there be a change in any of these risk factors.     Appearance:   Appropriate    Eye Contact:   Good    Psychomotor Behavior: Normal    Attitude:   Cooperative  Interested Friendly Pleasant   Orientation:   All   Speech    Rate / Production: Normal/ Responsive    Volume:  Normal    Mood:    Anxious  Normal   Affect:    Appropriate    Thought Content:  Clear    Thought Form:  Coherent  Logical    Insight:    Good  and Intellectual Insight     Medication Review:   No changes to current psychiatric medication(s)     Medication Compliance:   Yes     Changes in Health Issues:   None reported     Chemical Use Review:   Substance Use: Chemical use reviewed, no active concerns identified      Tobacco Use: No current tobacco use.      Diagnosis:  1. Adjustment disorder with mixed anxiety and depressed mood        Collateral Reports Completed:   Not Applicable    PLAN: (Patient Tasks / Therapist Tasks / Other)  Reflect on job situation, stay and cope or look for new job  Continue to practice reframing   Attend step-dad's birthday party  Continue to work on letting go of guilty, gently nudge core belief    Keaton Stein [facility], Provider Oversight:  Dr. Ye Thornton                                                           ______________________________________________________________________    Individual Treatment Plan    Patient's Name: Jamari Cota  YOB: 2000    Date of Creation: 3/4/22  Date Treatment Plan Last Reviewed/Revised: 3/4/22    DSM5 Diagnoses: Adjustment Disorders  309.28 (F43.23) With mixed anxiety and depressed mood  Psychosocial / Contextual Factors: concerns about acceptance by family, pt has started  dating, financial stressors  PROMIS (reviewed every 90 days):   PROMIS-10 Scores             31    Referral / Collaboration:  Referral to another professional/service is not indicated at this time..    Anticipated number of session for this episode of care: 8-13  Anticipation frequency of session: Biweekly  Anticipated Duration of each session: 38-52 minutes  Treatment plan will be reviewed in 90 days or when goals have been changed.       MeasurableTreatment Goal(s) related to diagnosis / functional impairment(s)  Goal 1: Patient will develop helpful coping skills, while learning to recognize unhelpful ones, and increase/expand his support system.     I will know I've met my goal when I am more able to feel more confident in myself and decisions/presetation.     Objective #A (Patient Action)    Patient will identify helpful vs unhelpful coping strategies to more effectively address stressors  exercise for 30 minutes 3 times per week for 30 minutes  Decrease frequency and intensity of feeling down, depressed, hopeless  Improve diet, appetite, mindful eating, and / or meal planning  Identify negative self-talk and behaviors: challenge core beliefs, myths, and actions  identify 5 coping strategies for improving mood  practice one mindfulness skill each day for 5-10 minutes.  Status: New - Date: 3/4/22     Intervention(s)  Therapist will assign homework related to target objective with the intent to promote pt autonomy and better manage MH.  Facilitate increase in self-awareness  Provide psycho-education on mindfulness and helpful vs. unhelpful coping identification  Teach/promote utilization of coping skill development/exploration and mindfulness techniques    Objective #B Pt will learn how to develop and identify a helpful support system.  Patient will participate in social or interpersonal activities to improve mood  attend and participate in social or recreational activities to expand social  "network/support  Increase interest, engagement, and pleasure in doing things  Identify negative self-talk and behaviors: challenge core beliefs, myths, and actions  track and record at least 3 pleasant exchanges with friends and new people.  Status: New - Date: 3/4/22     Intervention(s)  Therapist will assign homework related to target objective with the intent to promote pt autonomy and better manage MH.  Facilitate increase in self-awareness  Provide psycho-education on social distortions/fallacies and boundaries  Teach/promote utilization of social engagement skills and problem solving    Goal 2: Patient will be more able to engage socially and feel less anxious when interacting in a group setting.    I will know I've met my goal when I am able to be less stressed/anxious in social interactions, especially in group settings/activities.      Objective #A (Patient Action)    Status: New - Date: 3/4/22     Patient will identify and compliment positives at least 3 times daily to support positive self-image  identify unhelpful or unrealistic thoughts/concerns and stressors which contribute to feelings of anxiety  learn and demonstrate pro-social and communication assertiveness skill(s)  initiate 3 social contact(s) per day for increasing lengths of time  practice setting boundaries 3 times in the next several weeks  accept compliment with a \"thank you\" and no self deprecating comments.    Intervention(s)  Therapist will assign homework related to target objective with the intent to promote pt autonomy and better manage MH.  Facilitate increase in self-awareness  Provide psycho-education on social anxiety thinking and communication styles  Teach/promote utilization of thought challenging and self-confidence building    Goal 3: Patient will work on processing through self-identity and how to express his authentic or genuine self.     I will know I've met my goal when I feel more comfortable being my authentic or " genuine self.      Objective #A (Patient Action)    Status: New - Date: 3/4/22     Patient will identify communication strategies to more effectively address stressors  use thought-stopping strategy daily to reduce intrusive thoughts  Identify negative self-talk and behaviors: challenge core beliefs, myths, and actions  identify how the use of substances contributes to the avoidance of feeling associated with the loss  identify the time in your life when you began to feel poorly about themselves.  Use positive self-affirmation daily to promote self-acceptance.    Intervention(s)  Therapist will assign homework related to target objective with the intent to promote pt autonomy and better manage MH.  Facilitate increase in self-awareness  Provide psycho-education on self-compassion and anticipatory grief and avoidance  Teach/promote utilization of self-care/acceptance and processing through internal-experiences    Patient has reviewed and agreed to the above plan.      Keaton Stein  [facility]  March 4, 2022   Abdomen soft, nontender, nondistended, bowel sounds present in all 4 quadrants.

## 2022-04-11 NOTE — PROGRESS NOTES
[facility]                                     Progress Note    Patient Name: Jamari Cota  Date: 4/15/22         Service Type: Individual      Session Start Time: 11:00 am  Session End Time: 11:52 am     Session Length: 52 minutes    Session #: 4    Attendees: Client attended alone    Service Modality:  Video Visit:      Provider verified identity through the following two step process.  Patient provided:  Patient is known previously to provider    Telemedicine Visit: The patient's condition can be safely assessed and treated via synchronous audio and visual telemedicine encounter.      Reason for Telemedicine Visit: Services only offered telehealth    Originating Site (Patient Location): Patient's other car    Distant Site (Provider Location): Provider Remote Setting- Home Office    Consent:  The patient/guardian has verbally consented to: the potential risks and benefits of telemedicine (video visit) versus in person care; bill my insurance or make self-payment for services provided; and responsibility for payment of non-covered services.     Patient would like the video invitation sent by:  My Chart    Mode of Communication:  Video Conference via [employer]    As the provider I attest to compliance with applicable laws and regulations related to telemedicine.    DATA  Interactive Complexity: No  Crisis: No        Progress Since Last Session (Related to Symptoms / Goals / Homework):   Symptoms: Worsening symptoms, pt reported feeling down and stuck this week    Homework: Achieved / completed to satisfaction      Episode of Care Goals: Satisfactory progress - PREPARATION (Decided to change - considering how); Intervened by negotiating a change plan and determining options / strategies for behavior change, identifying triggers, exploring social supports, and working towards setting a date to begin behavior change     Current / Ongoing Stressors and Concerns:   Pt is currently seeking therapy  related to depression and anxiety around self-exploration related to his sexual identity (bi-sexual, possibly homosexual), and has some lingering shame around his sexual identity, which he is still trying to figure out. Pt grew up in a very conservative [employer] home, where same-sex relations were not supported, which has caused him to have an internal struggle between perceived expectations and his desire to be authentic. Since last session, pt reported that he had a good trip to [place], seeing family was nice, and had a good birthday party with his step-dad who really liked the PS5 gift. Pt reported that he has been working on re-framing his thoughts which has been helpful in feeling less guilty. Pt reported that the trip to [place] was emotionally exhausting due to the cultural difference, specifically the boomer attitudes and overt jokes related to gays and therapy, neither of which they knew related to pt. Pt was able to identify that seeing his dad and grandparents was good, with it being two years since he saw them last. Pt processed through his current frustrations with work and the work culture he has to deal with. Pt expressed how the overly competitive nature of his workplace is not a great fit for his personality. Overall pt stated he feels stuck and more recently been feeling more isolated, including feeling like he can't do anything novel due to obligations where he is now. Pt has attempted to be more social, which has been unsuccessful due to people backing out of plans frequently and he has been questioning if it is because people are avoiding him. Furthermore, he agreed that he likes having goals and he currently doesn't have any which has exacerbated this as it's making everything he does feel pointless. Pt processed through his thoughts on these things and expressed feeling constant drained of energy/tired. Pt clarified that he hasn't felt like he has had much direction or objectives since  graduating college, which was a big fear of his prior to graduating. Pt agreed that identifying some direction or ideas for his future and applying for jobs would be helpful in giving him a sense of not being stuck.    Current coping skills-  Music  Breathing technique  D8 pen (vape) - unhelpful  Eating - unhelpful  Socializing  Reading to escape (games books dnd) - unhelpful in excess   exerciseing  Singing (car singing)  Staying busy at work a lot with low-priority or off tasks - unhelpful?    Look up LGBT people who are surprising to pt, and bring 3 examples - openly singh [employer] member, music artists, [employer] channel: middle ground   Make list of 5 potential jobs or job fields you may want to go into - doesn't feel he has the confidence to apply for some jobs due to internalizing some feelings of past failure.     For next time, talk about staying healthy and habit forming skills, and wanting to not use delta 8 to manage anxiety (helpful vs. Unhelpful coping skills)     Treatment Objective(s) Addressed in This Session:   identify current thoughts, triggers, and stressors which contribute to feelings of depression  identify unhelpful or unrealistic fears / thoughts that contribute to feeling anxious  Decrease frequency and intensity of feeling down, depressed, hopeless  Identify negative self-talk and behaviors: challenge core beliefs, myths, and actions  identify coping strategies for improving mood  identify two areas of life that you would like to have improved functioning     Intervention:   CBT: Discussed how feelings aren't facts and that reframing can help use thoughts to modify emotions    Motivational Interviewing    MI Intervention: Expressed Empathy/Understanding, Supported Autonomy, Collaboration, Evocation, Permission to raise concern or advise, Open-ended questions and Reflections: simple and complex     Change Talk Expressed by the Patient: Ability to change Reasons to change Taking  steps    Provider Response to Change Talk: E - Evoked more info from patient about behavior change, A - Affirmed patient's thoughts, decisions, or attempts at behavior change, R - Reflected patient's change talk and S - Summarized patient's change talk statements    Psychodynamic: Processed internal-experiences related to sexual identity and family expectations  Solution Focused: Identified immediate areas pt would like to be more successful in and discussed potential strategies to overcome barriers to success.    Assessments completed prior to visit:  PHQ2:   PHQ-2 ( 1999 [employer]) 3/18/2022 12/14/2021 8/4/2016   Q1: Little interest or pleasure in doing things 1 1 0   Q2: Feeling down, depressed or hopeless 1 1 0   PHQ-2 Score 2 2 0   Q1: Little interest or pleasure in doing things Several days Several days -   Q2: Feeling down, depressed or hopeless Several days Several days -   PHQ-2 Score 2 2 -     PHQ9:   PHQ-9 SCORE 7/13/2015 12/3/2021 1/24/2022 2/21/2022   PHQ-9 Total Score 17 - - -   PHQ-9 Total Score MyChart - 6 (Mild depression) - -   PHQ-9 Total Score - 6 8 6     GAD2:   MILAD-2 12/14/2021 3/18/2022   Feeling nervous, anxious, or on edge 1 1   Not being able to stop or control worrying 1 1   MILAD-2 Total Score 2 2     GAD7:   MILAD-7 SCORE 12/3/2021 1/24/2022 2/21/2022   Total Score 5 (mild anxiety) - -   Total Score 5 7 8     PROMIS 10-Global Health (only subscores and total score):   PROMIS-10 Scores Only 12/14/2021 2/21/2022 3/18/2022   Global Mental Health Score 10 14 11   Global Physical Health Score 14 17 16   PROMIS TOTAL - SUBSCORES 24 31 27         ASSESSMENT: Current Emotional / Mental Status (status of significant symptoms):   Risk status (Self / Other harm or suicidal ideation)   Patient denies current fears or concerns for personal safety.   Patient denies current or recent suicidal ideation or behaviors.   Patient denies current or recent homicidal ideation or behaviors.   Patient denies current or  recent self injurious behavior or ideation.   Patient denies other safety concerns.   Patient reports there has been no change in risk factors since their last session.     Patient reports there has been no change in protective factors since their last session.     Recommended that patient call 911 or go to the local ED should there be a change in any of these risk factors.     Appearance:   Appropriate    Eye Contact:   Good    Psychomotor Behavior: Normal    Attitude:   Cooperative  Interested Friendly Pleasant   Orientation:   All   Speech    Rate / Production: Normal/ Responsive    Volume:  Normal    Mood:    Anxious  Depressed  Normal   Affect:    Appropriate    Thought Content:  Clear    Thought Form:  Coherent  Logical    Insight:    Fair      Medication Review:   No changes to current psychiatric medication(s)     Medication Compliance:   Yes     Changes in Health Issues:   None reported     Chemical Use Review:   Substance Use: Chemical use reviewed, no active concerns identified      Tobacco Use: No current tobacco use.      Diagnosis:  1. Adjustment disorder with mixed anxiety and depressed mood        Collateral Reports Completed:   Not Applicable    PLAN: (Patient Tasks / Therapist Tasks / Other)  Update resume, focus on positives and sell yourself   Apply for jobs, try to pick ones you would like to do (this is practice)  Write up what your ideal life situation looks like in 5 years (lottery fantasy)  List/identify what drains your energy the most  Continue to work on letting go of guilty, gently nudge core belief, continue to reframe    Keaton Stein [facility]                                                           ______________________________________________________________________    Individual Treatment Plan    Patient's Name: Jamari Cota  YOB: 2000    Date of Creation: 3/4/22  Date Treatment Plan Last Reviewed/Revised: 3/4/22    DSM5 Diagnoses: Adjustment Disorders  309.28  (F43.23) With mixed anxiety and depressed mood  Psychosocial / Contextual Factors: concerns about acceptance by family, pt has started dating, financial stressors  PROMIS (reviewed every 90 days):   PROMIS-10 Scores             31    Referral / Collaboration:  Referral to another professional/service is not indicated at this time..    Anticipated number of session for this episode of care: 8-13  Anticipation frequency of session: Biweekly  Anticipated Duration of each session: 38-52 minutes  Treatment plan will be reviewed in 90 days or when goals have been changed.       MeasurableTreatment Goal(s) related to diagnosis / functional impairment(s)  Goal 1: Patient will develop helpful coping skills, while learning to recognize unhelpful ones, and increase/expand his support system.     I will know I've met my goal when I am more able to feel more confident in myself and decisions/presetation.     Objective #A (Patient Action)    Patient will identify helpful vs unhelpful coping strategies to more effectively address stressors  exercise for 30 minutes 3 times per week for 30 minutes  Decrease frequency and intensity of feeling down, depressed, hopeless  Improve diet, appetite, mindful eating, and / or meal planning  Identify negative self-talk and behaviors: challenge core beliefs, myths, and actions  identify 5 coping strategies for improving mood  practice one mindfulness skill each day for 5-10 minutes.  Status: New - Date: 3/4/22     Intervention(s)  Therapist will assign homework related to target objective with the intent to promote pt autonomy and better manage MH.  Facilitate increase in self-awareness  Provide psycho-education on mindfulness and helpful vs. unhelpful coping identification  Teach/promote utilization of coping skill development/exploration and mindfulness techniques    Objective #B Pt will learn how to develop and identify a helpful support system.  Patient will participate in social or  "interpersonal activities to improve mood  attend and participate in social or recreational activities to expand social network/support  Increase interest, engagement, and pleasure in doing things  Identify negative self-talk and behaviors: challenge core beliefs, myths, and actions  track and record at least 3 pleasant exchanges with friends and new people.  Status: New - Date: 3/4/22     Intervention(s)  Therapist will assign homework related to target objective with the intent to promote pt autonomy and better manage MH.  Facilitate increase in self-awareness  Provide psycho-education on social distortions/fallacies and boundaries  Teach/promote utilization of social engagement skills and problem solving    Goal 2: Patient will be more able to engage socially and feel less anxious when interacting in a group setting.    I will know I've met my goal when I am able to be less stressed/anxious in social interactions, especially in group settings/activities.      Objective #A (Patient Action)    Status: New - Date: 3/4/22     Patient will identify and compliment positives at least 3 times daily to support positive self-image  identify unhelpful or unrealistic thoughts/concerns and stressors which contribute to feelings of anxiety  learn and demonstrate pro-social and communication assertiveness skill(s)  initiate 3 social contact(s) per day for increasing lengths of time  practice setting boundaries 3 times in the next several weeks  accept compliment with a \"thank you\" and no self deprecating comments.    Intervention(s)  Therapist will assign homework related to target objective with the intent to promote pt autonomy and better manage MH.  Facilitate increase in self-awareness  Provide psycho-education on social anxiety thinking and communication styles  Teach/promote utilization of thought challenging and self-confidence building    Goal 3: Patient will work on processing through self-identity and how to express " his authentic or genuine self.     I will know I've met my goal when I feel more comfortable being my authentic or genuine self.      Objective #A (Patient Action)    Status: New - Date: 3/4/22     Patient will identify communication strategies to more effectively address stressors  use thought-stopping strategy daily to reduce intrusive thoughts  Identify negative self-talk and behaviors: challenge core beliefs, myths, and actions  identify how the use of substances contributes to the avoidance of feeling associated with the loss  identify the time in your life when you began to feel poorly about themselves.  Use positive self-affirmation daily to promote self-acceptance.    Intervention(s)  Therapist will assign homework related to target objective with the intent to promote pt autonomy and better manage MH.  Facilitate increase in self-awareness  Provide psycho-education on self-compassion and anticipatory grief and avoidance  Teach/promote utilization of self-care/acceptance and processing through internal-experiences    Patient has reviewed and agreed to the above plan.      Keaton Stein  [facility]  March 4, 2022

## 2022-04-15 ENCOUNTER — VIRTUAL VISIT (OUTPATIENT)
Dept: BEHAVIORAL HEALTH | Facility: HOSPITAL | Age: 22
End: 2022-04-15
Payer: COMMERCIAL

## 2022-04-15 DIAGNOSIS — F43.23 ADJUSTMENT DISORDER WITH MIXED ANXIETY AND DEPRESSED MOOD: Primary | ICD-10-CM

## 2022-04-15 PROCEDURE — 90834 PSYTX W PT 45 MINUTES: CPT | Mod: 95 | Performed by: COUNSELOR

## 2022-04-29 ENCOUNTER — VIRTUAL VISIT (OUTPATIENT)
Dept: BEHAVIORAL HEALTH | Facility: HOSPITAL | Age: 22
End: 2022-04-29
Attending: PSYCHIATRY & NEUROLOGY
Payer: COMMERCIAL

## 2022-04-29 DIAGNOSIS — F43.23 ADJUSTMENT DISORDER WITH MIXED ANXIETY AND DEPRESSED MOOD: Primary | ICD-10-CM

## 2022-04-29 PROCEDURE — 90837 PSYTX W PT 60 MINUTES: CPT | Mod: 95 | Performed by: COUNSELOR

## 2022-04-29 NOTE — PROGRESS NOTES
[facility]                                     Progress Note    Patient Name: Jamari Cota  Date: 4/29/22         Service Type: Individual      Session Start Time: 11:00 am  Session End Time: 12:00 am     Session Length: 60 minutes    Session #: 5    Attendees: Client attended alone    Service Modality:  Video Visit:      Provider verified identity through the following two step process.  Patient provided:  Patient is known previously to provider    Telemedicine Visit: The patient's condition can be safely assessed and treated via synchronous audio and visual telemedicine encounter.      Reason for Telemedicine Visit: Services only offered telehealth    Originating Site (Patient Location): Patient's home    Distant Site (Provider Location): Provider Remote Setting- Home Office    Consent:  The patient/guardian has verbally consented to: the potential risks and benefits of telemedicine (video visit) versus in person care; bill my insurance or make self-payment for services provided; and responsibility for payment of non-covered services.     Patient would like the video invitation sent by:  My Chart    Mode of Communication:  Video Conference via Amwell    As the provider I attest to compliance with applicable laws and regulations related to telemedicine.    DATA  Interactive Complexity: Yes, visit entailed Interactive Complexity evidenced by:  -The need to manage maladaptive communication (related to, e.g., high anxiety, high reactivity, repeated questions, or disagreement) among participants that complicates delivery of care  Crisis: No        Progress Since Last Session (Related to Symptoms / Goals / Homework):   Symptoms: Worsening symptoms, pt has been feeling progressively more depressed this past week    Homework: Partially completed      Episode of Care Goals: Minimal progress - PREPARATION (Decided to change - considering how); Intervened by negotiating a change plan and determining  "options / strategies for behavior change, identifying triggers, exploring social supports, and working towards setting a date to begin behavior change     Current / Ongoing Stressors and Concerns:   Pt is currently seeking therapy related to depression and anxiety around self-exploration related to his sexual identity (bi-sexual, possibly homosexual), and has some lingering shame around his sexual identity, which he is still trying to figure out. Pt grew up in a very conservative Zoroastrian home, where same-sex relations were not supported, which has caused him to have an internal struggle between perceived expectations and his desire to be authentic. Since last session, pt reported that these past two weeks have been really hard, especially with feeling motivated and dealing with a lot of negative self-image thoughts/feelings. Pt processed through the events of the past two weeks, identifying his sister failing school, challenges at work (especially mandatory overtime), and anti-LGBT messaging in media he has been noticing. Upon reflection pt identified that learning about [employer] recently was a big factor in making him start to feel hopeless. Pt spent a lot of time discussing how media has recently been impacting his mood and while doing so pt was tearful. Pt was able to recognize that thins have changed over time but still feels concern about coming out having a long term detrimental effect on his life/safety. Pt's feelings were validated and encouraged that this journey is a personal one, and that there is no \"right\" way to navigate it. Pt also expressed frustration and sadness that his family has been making anti-LGBT statements/gestures, which has been very upsetting as well. Pt talked about his sister failing school and broke down in tears when talking about how he felt he was the only one that was helping her or cared, as he doesn't see his own parents or the school trying to help her when she asks. " Pt then expressed feeling like he is failing at everything and just being overwhelmed. Pt clarified during session that his work is starting mandatory overtime, which is something that caused him to quit his last job, and confirmed this was going to happen again; which would result in loss of benefit and being able to continue therapy which was very upsetting to pt as well. Based on today's session pt likely would benefit from FMLA and was encouraged to look into it, which pt agreed with. Session went longer then anticipated due to complexity of topic matter and high emotional response of pt.     Current coping skills-  Music  Breathing technique  D8 pen (vape) - unhelpful  Eating - unhelpful  Socializing  Reading to escape (games books dnd) - unhelpful in excess   exerciseing  Singing (car singing)  Staying busy at work a lot with low-priority or off tasks - unhelpful?    Look up LGBT people who are surprising to pt, and bring 3 examples - openly singh [employer] member, music artists, j[employer] youtube channel: middle ground   Make list of 5 potential jobs or job fields you may want to go into - doesn't feel he has the confidence to apply for some jobs due to internalizing some feelings of past failure.     For next time, talk about staying healthy and habit forming skills, and wanting to not use delta 8 to manage anxiety (helpful vs. Unhelpful coping skills)     Treatment Objective(s) Addressed in This Session:   identify current thoughts, triggers, and stressors which contribute to feelings of depression  identify unhelpful or unrealistic fears / thoughts that contribute to feeling anxious  Decrease frequency and intensity of feeling down, depressed, hopeless  Identify negative self-talk and behaviors: challenge core beliefs, myths, and actions  identify coping strategies for improving mood  identify two areas of life that you would like to have improved functioning     Intervention:   CBT: Discussed how feelings aren't  facts and that reframing can help use thoughts to modify emotions    Motivational Interviewing    MI Intervention: Expressed Empathy/Understanding, Supported Autonomy, Collaboration, Evocation, Permission to raise concern or advise, Open-ended questions, Reflections: simple and complex, Rolled with resistance: Emphasized patient autonomy, Reframed sustain talk in the direction of change and Evoked patient agenda, Change talk (evoked) and Reframe     Change Talk Expressed by the Patient: Desire to change    Provider Response to Change Talk: E - Evoked more info from patient about behavior change, A - Affirmed patient's thoughts, decisions, or attempts at behavior change, R - Reflected patient's change talk and S - Summarized patient's change talk statements    Psychodynamic: Processed internal-experiences related to sexual identity and family expectations  Solution Focused: Identified immediate areas pt would like to be more successful in and discussed potential strategies to overcome barriers to success.    Assessments completed prior to visit:  PHQ2:   PHQ-2 ( 1999 [employer]) 3/18/2022 12/14/2021 8/4/2016   Q1: Little interest or pleasure in doing things 1 1 0   Q2: Feeling down, depressed or hopeless 1 1 0   PHQ-2 Score 2 2 0   Q1: Little interest or pleasure in doing things Several days Several days -   Q2: Feeling down, depressed or hopeless Several days Several days -   PHQ-2 Score 2 2 -     PHQ9:   PHQ-9 SCORE 7/13/2015 12/3/2021 1/24/2022 2/21/2022   PHQ-9 Total Score 17 - - -   PHQ-9 Total Score MyChart - 6 (Mild depression) - -   PHQ-9 Total Score - 6 8 6     GAD2:   MILAD-2 12/14/2021 3/18/2022   Feeling nervous, anxious, or on edge 1 1   Not being able to stop or control worrying 1 1   MILAD-2 Total Score 2 2     GAD7:   MILAD-7 SCORE 12/3/2021 1/24/2022 2/21/2022   Total Score 5 (mild anxiety) - -   Total Score 5 7 8     PROMIS 10-Global Health (only subscores and total score):   PROMIS-10 Scores Only 12/14/2021  2/21/2022 3/18/2022   Global Mental Health Score 10 14 11   Global Physical Health Score 14 17 16   PROMIS TOTAL - SUBSCORES 24 31 27         ASSESSMENT: Current Emotional / Mental Status (status of significant symptoms):   Risk status (Self / Other harm or suicidal ideation)   Patient denies current fears or concerns for personal safety.   Patient denies current or recent suicidal ideation or behaviors.   Patient denies current or recent homicidal ideation or behaviors.   Patient denies current or recent self injurious behavior or ideation.   Patient denies other safety concerns.   Patient reports there has been no change in risk factors since their last session.     Patient reports there has been no change in protective factors since their last session.     Recommended that patient call 911 or go to the local ED should there be a change in any of these risk factors.     Appearance:   Appropriate    Eye Contact:   Good    Psychomotor Behavior: Normal    Attitude:   Cooperative  Friendly Pleasant Guarded    Orientation:   All   Speech    Rate / Production: Normal/ Responsive Emotional    Volume:  Normal    Mood:    Anxious  Depressed  Normal   Affect:    Appropriate  Tearful Worrisome    Thought Content:  Clear    Thought Form:  Coherent  Logical    Insight:    Fair      Medication Review:   No changes to current psychiatric medication(s)     Medication Compliance:   Yes     Changes in Health Issues:   None reported     Chemical Use Review:   Substance Use: Chemical use reviewed, no active concerns identified      Tobacco Use: No current tobacco use.      Diagnosis:  1. Adjustment disorder with mixed anxiety and depressed mood        Collateral Reports Completed:   Not Applicable    PLAN: (Patient Tasks / Therapist Tasks / Other)  Look into [place]  Watch media discussed (celluloid closet, will and barrett, the old guard, other pro-LGBT media)    Put on hold:  Update resume, focus on positives and sell yourself   Apply  for jobs, try to pick ones you would like to do (this is practice)  Write up what your ideal life situation looks like in 5 years (lottery fantasy)  List/identify what drains your energy the most    Keaton Stein [facility]                                                           ______________________________________________________________________    Individual Treatment Plan    Patient's Name: Jamari Cota  YOB: 2000    Date of Creation: 3/4/22  Date Treatment Plan Last Reviewed/Revised: 3/4/22    DSM5 Diagnoses: Adjustment Disorders  309.28 (F43.23) With mixed anxiety and depressed mood  Psychosocial / Contextual Factors: concerns about acceptance by family, pt has started dating, financial stressors  PROMIS (reviewed every 90 days):   PROMIS-10 Scores             31    Referral / Collaboration:  Referral to another professional/service is not indicated at this time..    Anticipated number of session for this episode of care: 8-13  Anticipation frequency of session: Biweekly  Anticipated Duration of each session: 38-52 minutes  Treatment plan will be reviewed in 90 days or when goals have been changed.       MeasurableTreatment Goal(s) related to diagnosis / functional impairment(s)  Goal 1: Patient will develop helpful coping skills, while learning to recognize unhelpful ones, and increase/expand his support system.     I will know I've met my goal when I am more able to feel more confident in myself and decisions/presetation.     Objective #A (Patient Action)    Patient will identify helpful vs unhelpful coping strategies to more effectively address stressors  exercise for 30 minutes 3 times per week for 30 minutes  Decrease frequency and intensity of feeling down, depressed, hopeless  Improve diet, appetite, mindful eating, and / or meal planning  Identify negative self-talk and behaviors: challenge core beliefs, myths, and actions  identify 5 coping strategies for improving mood  practice one  mindfulness skill each day for 5-10 minutes.  Status: New - Date: 3/4/22     Intervention(s)  Therapist will assign homework related to target objective with the intent to promote pt autonomy and better manage MH.  Facilitate increase in self-awareness  Provide psycho-education on mindfulness and helpful vs. unhelpful coping identification  Teach/promote utilization of coping skill development/exploration and mindfulness techniques    Objective #B Pt will learn how to develop and identify a helpful support system.  Patient will participate in social or interpersonal activities to improve mood  attend and participate in social or recreational activities to expand social network/support  Increase interest, engagement, and pleasure in doing things  Identify negative self-talk and behaviors: challenge core beliefs, myths, and actions  track and record at least 3 pleasant exchanges with friends and new people.  Status: New - Date: 3/4/22     Intervention(s)  Therapist will assign homework related to target objective with the intent to promote pt autonomy and better manage MH.  Facilitate increase in self-awareness  Provide psycho-education on social distortions/fallacies and boundaries  Teach/promote utilization of social engagement skills and problem solving    Goal 2: Patient will be more able to engage socially and feel less anxious when interacting in a group setting.    I will know I've met my goal when I am able to be less stressed/anxious in social interactions, especially in group settings/activities.      Objective #A (Patient Action)    Status: New - Date: 3/4/22     Patient will identify and compliment positives at least 3 times daily to support positive self-image  identify unhelpful or unrealistic thoughts/concerns and stressors which contribute to feelings of anxiety  learn and demonstrate pro-social and communication assertiveness skill(s)  initiate 3 social contact(s) per day for increasing lengths of  "time  practice setting boundaries 3 times in the next several weeks  accept compliment with a \"thank you\" and no self deprecating comments.    Intervention(s)  Therapist will assign homework related to target objective with the intent to promote pt autonomy and better manage MH.  Facilitate increase in self-awareness  Provide psycho-education on social anxiety thinking and communication styles  Teach/promote utilization of thought challenging and self-confidence building    Goal 3: Patient will work on processing through self-identity and how to express his authentic or genuine self.     I will know I've met my goal when I feel more comfortable being my authentic or genuine self.      Objective #A (Patient Action)    Status: New - Date: 3/4/22     Patient will identify communication strategies to more effectively address stressors  use thought-stopping strategy daily to reduce intrusive thoughts  Identify negative self-talk and behaviors: challenge core beliefs, myths, and actions  identify how the use of substances contributes to the avoidance of feeling associated with the loss  identify the time in your life when you began to feel poorly about themselves.  Use positive self-affirmation daily to promote self-acceptance.    Intervention(s)  Therapist will assign homework related to target objective with the intent to promote pt autonomy and better manage MH.  Facilitate increase in self-awareness  Provide psycho-education on self-compassion and anticipatory grief and avoidance  Teach/promote utilization of self-care/acceptance and processing through internal-experiences    Patient has reviewed and agreed to the above plan.      Keaton Stein  [facility]  March 4, 2022  "

## 2022-05-05 NOTE — PROGRESS NOTES
PREVENTIVE CARDIOLOGY Follow up    HPI:  Jamari Cota is a 21 year old male who was referred to Cardiology clinic by Dr. Heard in gastroenterology for evaluation management of hyperlipidemia in the setting of nonalcoholic steatohepatitis.    2/4/22  Jamari is a pleasant young man with no history of clinical atherosclerotic CVD.  Other than elevated cholesterol he had some elevated blood pressures in the past, but more recently they have been very well controlled.  He does not smoke and his fasting glucose is normal.  No family history of atherosclerotic CVD.    We was referred to the GI clinic after he was found to have elevated transaminases.  Subsequent CT showed fatty liver disease.  He made major changes to his diet and started exercising.     5/6/22  Jamari is feeling well. He continues to eat health and exercise regularly.   He goes to the gym 3-4 times/week and does 30-40 minutes weights then 10 minute sprint. No chest pain or dyspnea.   He continues to lose weight, about 10 pounds over the past 3 months.     The ASCVD Risk score (Lafe DC Jr., et al., 2013) failed to calculate for the following reasons:    The 2013 ASCVD risk score is only valid for ages 40 to 79     ASSESSMENT AND PLAN  Jamari Cota is a 21 year old male with recent diagnosis of HOANG in the setting of very high LDL cholesterol and normal fasting glucose.  Prior to this diagnosis he had a very unhealthy dietary pattern and a sedentary lifestyle which is most likely the cause of his elevated cholesterol rather than a genetic component.     He continues to maintain his healthy lifestyle adjustments.   I recommend he stay on statin for at least 1 year. If he can keep his weight down and continue to eat well and exercise, we can consider stopping statin then.    Follow up in 1 year.    Thank you for the opportunity to participate in the care of Mr. Jamari Cota. Please feel free to contact me with any additional questions or concerns.    [place]  Virgil Ocasio MD    Preventive Cardiology  Pager: [phone]    PAST MEDICAL HISTORY:  Patient Active Problem List   Diagnosis     Overweight child     Adjustment disorder with depressed mood     Asymmetrical sensorineural hearing loss     Allergic rhinitis due to animals     Past Medical History:   Diagnosis Date     Fatty liver      Otitis media with effusion     chronic     Varicella     about age 3       CURRENT MEDICATIONS:  Current Outpatient Medications   Medication Sig Dispense Refill     hydrOXYzine (ATARAX) 25 MG tablet Take 1 tablet (25 mg) by mouth 3 times daily as needed for itching 90 tablet 2     rosuvastatin (CRESTOR) 20 MG tablet Take 1 tablet (20 mg) by mouth daily 90 tablet 3       PAST SURGICAL HISTORY:  Past Surgical History:   Procedure Laterality Date     PE TUBES       SURGICAL HISTORY OF -       arm surgery       ALLERGIES  Codeine    FAMILY HX:  Family History   Problem Relation Age of Onset     Hyperlipidemia Father      Diabetes Paternal Grandmother         Type II     Hyperlipidemia Paternal Grandmother      Diabetes Paternal Grandfather         Type II     Hyperlipidemia Paternal Grandfather      Cancer Maternal Uncle         eye cancer     Hypertension No family hx of      Coronary Artery Disease No family hx of      Breast Cancer No family hx of      Colon Cancer No family hx of      Prostate Cancer No family hx of        SOCIAL HX:  Social History     Tobacco Use     Smoking status: Never Smoker     Smokeless tobacco: Never Used     Tobacco comment: non smoking home, mother and step father smoke   Vaping Use     Vaping Use: Never used   Substance Use Topics     Alcohol use: No     Alcohol/week: 0.0 standard drinks     Drug use: No        ROS:  Comprehensive ROS is negative except as noted in HPI.    VITAL SIGNS:  /74 (BP Location: Right arm, Patient Position: Chair, Cuff Size: Adult Large)   Pulse 82   Wt 103.1 kg (227 lb 3.2 oz)   SpO2 97%   BMI 30.81  kg/m    Body mass index is 30.81 kg/m .  Wt Readings from Last 2 Encounters:   05/06/22 103.1 kg (227 lb 3.2 oz)   02/14/22 108.4 kg (239 lb)       PHYSICAL EXAM  Gen: pleasant male sitting comfortably in NAD  Head: nc/at  Eyes: EOMI, no corneal arcus  CV: nml s1/s2, no murmur or gallop; no JVD  Chest: clear lungs    LABS: personally reviewed  CMP  Recent Labs   Lab Test 02/14/22  0734 09/28/21  1707    137   POTASSIUM 4.1 3.5   CHLORIDE 109 104   CO2 26 25   ANIONGAP 5 8   GLC 93 91   BUN 11 15   CR 0.92 1.10   GFRESTIMATED >90 >90   TORI 9.1 9.2   PROTTOTAL 7.7 8.5   ALBUMIN 4.1 4.7   BILITOTAL 1.6* 1.9*   ALKPHOS 57 67   AST 20 78*   ALT 49 236*     CBC  Recent Labs   Lab Test 02/14/22  0734 09/28/21  1707   WBC 6.5 8.6   RBC 5.43 6.05*   HGB 15.6 17.2   HCT 48.5 51.0   MCV 89 84   MCH 28.7 28.4   MCHC 32.2 33.7   RDW 13.5 12.1    314     INR  Recent Labs   Lab Test 02/14/22  0734   INR 0.99     Recent Labs   Lab Test 02/04/22  1022 10/01/21  0831   CHOL 148 241*   HDL 34* 30*   LDL 93 180*   TRIG 104 154*     No lab results found.    5/6/22 EKG: normal sinus rhythm, normal ECG

## 2022-05-06 ENCOUNTER — OFFICE VISIT (OUTPATIENT)
Dept: CARDIOLOGY | Facility: CLINIC | Age: 22
End: 2022-05-06
Attending: INTERNAL MEDICINE
Payer: COMMERCIAL

## 2022-05-06 VITALS
WEIGHT: 227.2 LBS | DIASTOLIC BLOOD PRESSURE: 74 MMHG | OXYGEN SATURATION: 97 % | SYSTOLIC BLOOD PRESSURE: 111 MMHG | BODY MASS INDEX: 30.81 KG/M2 | HEART RATE: 82 BPM

## 2022-05-06 DIAGNOSIS — E78.2 MIXED HYPERLIPIDEMIA: ICD-10-CM

## 2022-05-06 DIAGNOSIS — K75.81 NASH (NONALCOHOLIC STEATOHEPATITIS): ICD-10-CM

## 2022-05-06 PROCEDURE — G0463 HOSPITAL OUTPT CLINIC VISIT: HCPCS | Mod: 25

## 2022-05-06 PROCEDURE — 93005 ELECTROCARDIOGRAM TRACING: CPT

## 2022-05-06 PROCEDURE — 99212 OFFICE O/P EST SF 10 MIN: CPT | Performed by: INTERNAL MEDICINE

## 2022-05-06 ASSESSMENT — PAIN SCALES - GENERAL: PAINLEVEL: NO PAIN (0)

## 2022-05-06 NOTE — NURSING NOTE
Chief Complaint   Patient presents with     Follow Up     Reason for visit: [place] Prevention F/U     Vitals were taken, medications reconciled, and EKG was performed.    Horace Johnson, EMT  8:55 AM

## 2022-05-06 NOTE — LETTER
5/6/2022      RE: Jamari Cota  [address]  [facility] [address]       Dear Colleague,    Thank you for the opportunity to participate in the care of your patient, Jamari Cota, at the [facility] [place] at [facility]. Please see a copy of my visit note below.    PREVENTIVE CARDIOLOGY Follow up    HPI:  Jamari Cota is a 21 year old male who was referred to Cardiology clinic by Dr. Heard in gastroenterology for evaluation management of hyperlipidemia in the setting of nonalcoholic steatohepatitis.    2/4/22  Jamari is a pleasant young man with no history of clinical atherosclerotic CVD.  Other than elevated cholesterol he had some elevated blood pressures in the past, but more recently they have been very well controlled.  He does not smoke and his fasting glucose is normal.  No family history of atherosclerotic CVD.    We was referred to the GI clinic after he was found to have elevated transaminases.  Subsequent CT showed fatty liver disease.  He made major changes to his diet and started exercising.     5/6/22  Jamari is feeling well. He continues to eat health and exercise regularly.   He goes to the gym 3-4 times/week and does 30-40 minutes weights then 10 minute sprint. No chest pain or dyspnea.   He continues to lose weight, about 10 pounds over the past 3 months.     The ASCVD Risk score (Florence DC Jr., et al., 2013) failed to calculate for the following reasons:    The 2013 ASCVD risk score is only valid for ages 40 to 79     ASSESSMENT AND PLAN  Jamari Cota is a 21 year old male with recent diagnosis of HOANG in the setting of very high LDL cholesterol and normal fasting glucose.  Prior to this diagnosis he had a very unhealthy dietary pattern and a sedentary lifestyle which is most likely the cause of his elevated cholesterol rather than a genetic component.     He continues to maintain his healthy lifestyle adjustments.   I  recommend he stay on statin for at least 1 year. If he can keep his weight down and continue to eat well and exercise, we can consider stopping statin then.    Follow up in 1 year.    Thank you for the opportunity to participate in the care of Mr. Jamari Cota. Please feel free to contact me with any additional questions or concerns.    Davon Ocasio MD    Preventive Cardiology  Pager: [phone]    PAST MEDICAL HISTORY:  Patient Active Problem List   Diagnosis     Overweight child     Adjustment disorder with depressed mood     Asymmetrical sensorineural hearing loss     Allergic rhinitis due to animals     Past Medical History:   Diagnosis Date     Fatty liver      Otitis media with effusion     chronic     Varicella     about age 3       CURRENT MEDICATIONS:  Current Outpatient Medications   Medication Sig Dispense Refill     hydrOXYzine (ATARAX) 25 MG tablet Take 1 tablet (25 mg) by mouth 3 times daily as needed for itching 90 tablet 2     rosuvastatin (CRESTOR) 20 MG tablet Take 1 tablet (20 mg) by mouth daily 90 tablet 3       PAST SURGICAL HISTORY:  Past Surgical History:   Procedure Laterality Date     PE TUBES       SURGICAL HISTORY OF -       arm surgery       ALLERGIES  Codeine    FAMILY HX:  Family History   Problem Relation Age of Onset     Hyperlipidemia Father      Diabetes Paternal Grandmother         Type II     Hyperlipidemia Paternal Grandmother      Diabetes Paternal Grandfather         Type II     Hyperlipidemia Paternal Grandfather      Cancer Maternal Uncle         eye cancer     Hypertension No family hx of      Coronary Artery Disease No family hx of      Breast Cancer No family hx of      Colon Cancer No family hx of      Prostate Cancer No family hx of        SOCIAL HX:  Social History     Tobacco Use     Smoking status: Never Smoker     Smokeless tobacco: Never Used     Tobacco comment: non smoking home, mother and step father smoke   Vaping Use     Vaping Use: Never  used   Substance Use Topics     Alcohol use: No     Alcohol/week: 0.0 standard drinks     Drug use: No        ROS:  Comprehensive ROS is negative except as noted in HPI.    VITAL SIGNS:  /74 (BP Location: Right arm, Patient Position: Chair, Cuff Size: Adult Large)   Pulse 82   Wt 103.1 kg (227 lb 3.2 oz)   SpO2 97%   BMI 30.81 kg/m    Body mass index is 30.81 kg/m .  Wt Readings from Last 2 Encounters:   05/06/22 103.1 kg (227 lb 3.2 oz)   02/14/22 108.4 kg (239 lb)       PHYSICAL EXAM  Gen: pleasant male sitting comfortably in NAD  Head: nc/at  Eyes: EOMI, no corneal arcus  CV: nml s1/s2, no murmur or gallop; no JVD  Chest: clear lungs    LABS: personally reviewed  CMP  Recent Labs   Lab Test 02/14/22  0734 09/28/21  1707    137   POTASSIUM 4.1 3.5   CHLORIDE 109 104   CO2 26 25   ANIONGAP 5 8   GLC 93 91   BUN 11 15   CR 0.92 1.10   GFRESTIMATED >90 >90   TORI 9.1 9.2   PROTTOTAL 7.7 8.5   ALBUMIN 4.1 4.7   BILITOTAL 1.6* 1.9*   ALKPHOS 57 67   AST 20 78*   ALT 49 236*     CBC  Recent Labs   Lab Test 02/14/22  0734 09/28/21  1707   WBC 6.5 8.6   RBC 5.43 6.05*   HGB 15.6 17.2   HCT 48.5 51.0   MCV 89 84   MCH 28.7 28.4   MCHC 32.2 33.7   RDW 13.5 12.1    314     INR  Recent Labs   Lab Test 02/14/22  0734   INR 0.99     Recent Labs   Lab Test 02/04/22  1022 10/01/21  0831   CHOL 148 241*   HDL 34* 30*   LDL 93 180*   TRIG 104 154*     No lab results found.    5/6/22 EKG: normal sinus rhythm, normal ECG      Please do not hesitate to contact me if you have any questions/concerns.     Sincerely,     Davon Ocasio MD

## 2022-05-06 NOTE — PATIENT INSTRUCTIONS
"You were seen today in the Cardiovascular Clinic at the [facility].     Cardiology Providers you saw during your visit: Dr. Virgil Ocasio     Diagnosis:   Encounter Diagnoses   Name Primary?    Mixed hyperlipidemia     HOANG (nonalcoholic steatohepatitis)         Recommendations:   1. Continue rosuvastatin for a least 1 year while you maintain your health lifestyle changes. If you remain active and eating well, you can try stopping at that time and we can recheck cholesterol off the statin.  2. Follow up with Dr. Virgil Ocasio in 1 year.       Please feel free to call me with any questions or concerns.       Ivon Bejarano RN     Questions: [phone].   First press #1 for the [employer] and then press #4 for \"Medical Questions\" to reach us Cardiology Nurses.     [phone].   First press #1 for the [employer] and then press #1     On Call Cardiologist for after hours or on weekends: [phone]   option #4 and ask to speak to the on-call Cardiologist.          If you need a medication refill please contact your pharmacy.  Please allow 3 business days for your refill to be completed.  ________________________________________________________________________________________________________________________________         "

## 2022-05-06 NOTE — LETTER
Date:May 6, 2022      Patient was self referred, no letter generated. Do not send.        [facility] Health Information

## 2022-05-09 LAB
ATRIAL RATE - MUSE: 66 BPM
DIASTOLIC BLOOD PRESSURE - MUSE: NORMAL MMHG
INTERPRETATION ECG - MUSE: NORMAL
P AXIS - MUSE: 36 DEGREES
PR INTERVAL - MUSE: 156 MS
QRS DURATION - MUSE: 92 MS
QT - MUSE: 398 MS
QTC - MUSE: 417 MS
R AXIS - MUSE: 73 DEGREES
SYSTOLIC BLOOD PRESSURE - MUSE: NORMAL MMHG
T AXIS - MUSE: 52 DEGREES
VENTRICULAR RATE- MUSE: 66 BPM

## 2022-05-13 ENCOUNTER — VIRTUAL VISIT (OUTPATIENT)
Dept: BEHAVIORAL HEALTH | Facility: HOSPITAL | Age: 22
End: 2022-05-13
Payer: COMMERCIAL

## 2022-05-13 DIAGNOSIS — F43.23 ADJUSTMENT DISORDER WITH MIXED ANXIETY AND DEPRESSED MOOD: Primary | ICD-10-CM

## 2022-05-13 PROCEDURE — 90837 PSYTX W PT 60 MINUTES: CPT | Mod: 95 | Performed by: COUNSELOR

## 2022-05-13 NOTE — PROGRESS NOTES
[facility]                                     Progress Note    Patient Name: Jamari Cota  Date: 5/13/22         Service Type: Individual      Session Start Time: 11:00 am  Session End Time: 12:00 pm     Session Length: 60 minutes    Session #: 6    Attendees: Client attended alone    Service Modality:  Video Visit:      Provider verified identity through the following two step process.  Patient provided:  Patient is known previously to provider    Telemedicine Visit: The patient's condition can be safely assessed and treated via synchronous audio and visual telemedicine encounter.      Reason for Telemedicine Visit: Services only offered telehealth    Originating Site (Patient Location): Patient's other car    Distant Site (Provider Location): Provider Remote Setting- Home Office    Consent:  The patient/guardian has verbally consented to: the potential risks and benefits of telemedicine (video visit) versus in person care; bill my insurance or make self-payment for services provided; and responsibility for payment of non-covered services.     Patient would like the video invitation sent by:  My Chart    Mode of Communication:  Video Conference via [employer]    As the provider I attest to compliance with applicable laws and regulations related to telemedicine.    DATA  Interactive Complexity: Yes, visit entailed Interactive Complexity evidenced by:  -The need to manage maladaptive communication (related to, e.g., high anxiety, high reactivity, repeated questions, or disagreement) among participants that complicates delivery of care  Crisis: No        Progress Since Last Session (Related to Symptoms / Goals / Homework):   Symptoms: No change in symptoms, pt reports that things are about the same as last time, but presented with a better mood    Homework: Achieved / completed to satisfaction      Episode of Care Goals: Satisfactory progress - PREPARATION (Decided to change - considering how);  Intervened by negotiating a change plan and determining options / strategies for behavior change, identifying triggers, exploring social supports, and working towards setting a date to begin behavior change     Current / Ongoing Stressors and Concerns:   Pt is currently seeking therapy related to depression and anxiety around self-exploration related to his sexual identity (bi-sexual, possibly homosexual), and has some lingering shame around his sexual identity, which he is still trying to figure out. Pt grew up in a very conservative Latter day home, where same-sex relations were not supported, which has caused him to have an internal struggle between perceived expectations and his desire to be authentic. Since last session, pt reported he is doing better and while he still is thinking about the topics he brought up last time, he has given himself the space to not hyper-focus on his sexuality, which has been helpful in getting his mind into a better space. Pt reported that mandatory overtime was very limited and resulted in being more manageable, so this has stopped being a major stressor at this point. Pt clarified that his mandatory overtime was only one day and that his request to be remote two days a week being approved were significant with this. Pt did identify that thinking of putting his two week notice in did happen and that it is a sign of feeling overwhelmed. Pt is not considering FMLA at this time, as he sees it as giving up/failing, but agrees that it is a helpful option to keep in the back of his mind. Pt also had a bright spot when he helped his friend move, and was able to see the improvement his working-out, which was very affirming. Pt processed through some feeling a text his ex-girlfriend from [employer] brought up and the hx there. Pt acknowledged that this relationship is the only (major) relationship he has had and that it has a lot of emotional weight bound up to it, which makes it a challenging  topic for him. Pt expressed feeling a little embarrassed from last session, so took some time to open up, but did by end of session, which caused session to go longer then anticipated. Pt also agreed that switching to every-week in the near future would be a good idea up additional discussion. Pt vervalized that he is a fairy sensitive person, so he is commonly feels like he is about to break down crying, but would like to get to the point that he doesn't always feel that way which is part of why he thing meeting every week would be more helpful.     Topic for the future: second round of dating with ex-girlfriend    Current coping skills-  Music  Breathing technique  D8 pen (vape) - unhelpful  Eating - unhelpful  Socializing  Reading to escape (games books dnd) - unhelpful in excess   exerciseing  Singing (car singing)  Staying busy at work a lot with low-priority or off tasks - unhelpful?    Look up LGBT people who are surprising to pt, and bring 3 examples - openly singh [employer] member, music artists, j[employer] youtube channel: middle ground   Make list of 5 potential jobs or job fields you may want to go into - doesn't feel he has the confidence to apply for some jobs due to internalizing some feelings of past failure.     For next time, talk about staying healthy and habit forming skills, and wanting to not use delta 8 to manage anxiety (helpful vs. Unhelpful coping skills)     Treatment Objective(s) Addressed in This Session:   identify current thoughts, triggers, and stressors which contribute to feelings of depression  identify unhelpful or unrealistic fears / thoughts that contribute to feeling anxious  Decrease frequency and intensity of feeling down, depressed, hopeless  Identify negative self-talk and behaviors: challenge core beliefs, myths, and actions  identify coping strategies for improving mood  identify two areas of life that you would like to have improved functioning     Intervention:   CBT: Discussed  how feelings aren't facts and that reframing can help use thoughts to modify emotions    Motivational Interviewing    MI Intervention: Expressed Empathy/Understanding, Supported Autonomy, Collaboration, Evocation, Open-ended questions, Reflections: simple and complex and Reframe     Change Talk Expressed by the Patient: Desire to change Ability to change Committment to change Activation    Provider Response to Change Talk: E - Evoked more info from patient about behavior change, A - Affirmed patient's thoughts, decisions, or attempts at behavior change, R - Reflected patient's change talk and S - Summarized patient's change talk statements    Psychodynamic: Processed internal-experiences related to sexual identity and family expectations  Solution Focused: Identified immediate areas pt would like to be more successful in and discussed potential strategies to overcome barriers to success.    Assessments completed prior to visit:  PHQ2:   PHQ-2 ( 1999 [employer]) 3/18/2022 12/14/2021 8/4/2016   Q1: Little interest or pleasure in doing things 1 1 0   Q2: Feeling down, depressed or hopeless 1 1 0   PHQ-2 Score 2 2 0   Q1: Little interest or pleasure in doing things Several days Several days -   Q2: Feeling down, depressed or hopeless Several days Several days -   PHQ-2 Score 2 2 -     PHQ9:   PHQ-9 SCORE 7/13/2015 12/3/2021 1/24/2022 2/21/2022   PHQ-9 Total Score 17 - - -   PHQ-9 Total Score MyChart - 6 (Mild depression) - -   PHQ-9 Total Score - 6 8 6     GAD2:   MILAD-2 12/14/2021 3/18/2022   Feeling nervous, anxious, or on edge 1 1   Not being able to stop or control worrying 1 1   MILAD-2 Total Score 2 2     GAD7:   MILAD-7 SCORE 12/3/2021 1/24/2022 2/21/2022   Total Score 5 (mild anxiety) - -   Total Score 5 7 8     PROMIS 10-Global Health (only subscores and total score):   PROMIS-10 Scores Only 12/14/2021 2/21/2022 3/18/2022   Global Mental Health Score 10 14 11   Global Physical Health Score 14 17 16   PROMIS TOTAL -  "SUBSCORES 24 31 27         ASSESSMENT: Current Emotional / Mental Status (status of significant symptoms):   Risk status (Self / Other harm or suicidal ideation)   Patient denies current fears or concerns for personal safety.   Patient denies current or recent suicidal ideation or behaviors.   Patient denies current or recent homicidal ideation or behaviors.   Patient denies current or recent self injurious behavior or ideation.   Patient denies other safety concerns.   Patient reports there has been no change in risk factors since their last session.     Patient reports there has been no change in protective factors since their last session.     Recommended that patient call 911 or go to the local ED should there be a change in any of these risk factors.     Appearance:   Appropriate    Eye Contact:   Good    Psychomotor Behavior: Normal    Attitude:   Cooperative  Interested Friendly Pleasant   Orientation:   All   Speech    Rate / Production: Normal/ Responsive    Volume:  Normal    Mood:    Depressed  Normal   Affect:    Appropriate    Thought Content:  Clear    Thought Form:  Coherent  Logical    Insight:    Fair  and Intellectual Insight     Medication Review:   No changes to current psychiatric medication(s)     Medication Compliance:   Yes     Changes in Health Issues:   None reported     Chemical Use Review:   Substance Use: Chemical use reviewed, no active concerns identified      Tobacco Use: No current tobacco use.      Diagnosis:  1. Adjustment disorder with mixed anxiety and depressed mood        Collateral Reports Completed:   Not Applicable    PLAN: (Patient Tasks / Therapist Tasks / Other)  Compliment and/or just say \"I am just doing it for myself\" and not self-depricating.  Watch media discussed (celluloid closet)  Be mindful of taking a step back and using over-thinking as a chance to be more objective of worries.  Work on recognizing feeling like you want to put your two week notice in at work " (ie. Flag for feeling overwhelmed)    Put on hold:  Update resume, focus on positives and sell yourself   Apply for jobs, try to pick ones you would like to do (this is practice)  Write up what your ideal life situation looks like in 5 years (lottery fantasy)  List/identify what drains your energy the most    Keaton Stein [facility]                                                       ______________________________________________________________________    Individual Treatment Plan    Patient's Name: Jamari Cota  YOB: 2000    Date of Creation: 3/4/22  Date Treatment Plan Last Reviewed/Revised: 3/4/22    DSM5 Diagnoses: Adjustment Disorders  309.28 (F43.23) With mixed anxiety and depressed mood  Psychosocial / Contextual Factors: concerns about acceptance by family, pt has started dating, financial stressors  PROMIS (reviewed every 90 days):   PROMIS-10 Scores             31    Referral / Collaboration:  Referral to another professional/service is not indicated at this time..    Anticipated number of session for this episode of care: 8-13  Anticipation frequency of session: Biweekly  Anticipated Duration of each session: 38-52 minutes  Treatment plan will be reviewed in 90 days or when goals have been changed.       MeasurableTreatment Goal(s) related to diagnosis / functional impairment(s)  Goal 1: Patient will develop helpful coping skills, while learning to recognize unhelpful ones, and increase/expand his support system.     I will know I've met my goal when I am more able to feel more confident in myself and decisions/presetation.     Objective #A (Patient Action)    Patient will identify helpful vs unhelpful coping strategies to more effectively address stressors  exercise for 30 minutes 3 times per week for 30 minutes  Decrease frequency and intensity of feeling down, depressed, hopeless  Improve diet, appetite, mindful eating, and / or meal planning  Identify negative self-talk and behaviors:  challenge core beliefs, myths, and actions  identify 5 coping strategies for improving mood  practice one mindfulness skill each day for 5-10 minutes.  Status: New - Date: 3/4/22     Intervention(s)  Therapist will assign homework related to target objective with the intent to promote pt autonomy and better manage MH.  Facilitate increase in self-awareness  Provide psycho-education on mindfulness and helpful vs. unhelpful coping identification  Teach/promote utilization of coping skill development/exploration and mindfulness techniques    Objective #B Pt will learn how to develop and identify a helpful support system.  Patient will participate in social or interpersonal activities to improve mood  attend and participate in social or recreational activities to expand social network/support  Increase interest, engagement, and pleasure in doing things  Identify negative self-talk and behaviors: challenge core beliefs, myths, and actions  track and record at least 3 pleasant exchanges with friends and new people.  Status: New - Date: 3/4/22     Intervention(s)  Therapist will assign homework related to target objective with the intent to promote pt autonomy and better manage MH.  Facilitate increase in self-awareness  Provide psycho-education on social distortions/fallacies and boundaries  Teach/promote utilization of social engagement skills and problem solving    Goal 2: Patient will be more able to engage socially and feel less anxious when interacting in a group setting.    I will know I've met my goal when I am able to be less stressed/anxious in social interactions, especially in group settings/activities.      Objective #A (Patient Action)    Status: New - Date: 3/4/22     Patient will identify and compliment positives at least 3 times daily to support positive self-image  identify unhelpful or unrealistic thoughts/concerns and stressors which contribute to feelings of anxiety  learn and demonstrate pro-social  "and communication assertiveness skill(s)  initiate 3 social contact(s) per day for increasing lengths of time  practice setting boundaries 3 times in the next several weeks  accept compliment with a \"thank you\" and no self deprecating comments.    Intervention(s)  Therapist will assign homework related to target objective with the intent to promote pt autonomy and better manage MH.  Facilitate increase in self-awareness  Provide psycho-education on social anxiety thinking and communication styles  Teach/promote utilization of thought challenging and self-confidence building    Goal 3: Patient will work on processing through self-identity and how to express his authentic or genuine self.     I will know I've met my goal when I feel more comfortable being my authentic or genuine self.      Objective #A (Patient Action)    Status: New - Date: 3/4/22     Patient will identify communication strategies to more effectively address stressors  use thought-stopping strategy daily to reduce intrusive thoughts  Identify negative self-talk and behaviors: challenge core beliefs, myths, and actions  identify how the use of substances contributes to the avoidance of feeling associated with the loss  identify the time in your life when you began to feel poorly about themselves.  Use positive self-affirmation daily to promote self-acceptance.    Intervention(s)  Therapist will assign homework related to target objective with the intent to promote pt autonomy and better manage MH.  Facilitate increase in self-awareness  Provide psycho-education on self-compassion and anticipatory grief and avoidance  Teach/promote utilization of self-care/acceptance and processing through internal-experiences    Patient has reviewed and agreed to the above plan.      Keaton Stein  [facility]  March 4, 2022  "

## 2022-05-27 ENCOUNTER — VIRTUAL VISIT (OUTPATIENT)
Dept: BEHAVIORAL HEALTH | Facility: HOSPITAL | Age: 22
End: 2022-05-27
Payer: COMMERCIAL

## 2022-05-27 DIAGNOSIS — F43.23 ADJUSTMENT DISORDER WITH MIXED ANXIETY AND DEPRESSED MOOD: Primary | ICD-10-CM

## 2022-05-27 PROCEDURE — 90837 PSYTX W PT 60 MINUTES: CPT | Mod: 95 | Performed by: COUNSELOR

## 2022-05-27 NOTE — PROGRESS NOTES
[facility]                                     Progress Note    Patient Name: Jamari Cota  Date: 5/27/22         Service Type: Individual      Session Start Time: 11:02 am  Session End Time: 12:04 pm     Session Length: 62 minutes    Session #: 7    Attendees: Client attended alone    Service Modality:  Video Visit:      Provider verified identity through the following two step process.  Patient provided:  Patient is known previously to provider    Telemedicine Visit: The patient's condition can be safely assessed and treated via synchronous audio and visual telemedicine encounter.      Reason for Telemedicine Visit: Services only offered telehealth    Originating Site (Patient Location): Patient's other car    Distant Site (Provider Location): Provider Remote Setting- Home Office    Consent:  The patient/guardian has verbally consented to: the potential risks and benefits of telemedicine (video visit) versus in person care; bill my insurance or make self-payment for services provided; and responsibility for payment of non-covered services.     Patient would like the video invitation sent by:  My Chart    Mode of Communication:  Video Conference via [employer]well    As the provider I attest to compliance with applicable laws and regulations related to telemedicine.    DATA  Interactive Complexity: No  Crisis: No  Extended Session (53+ minutes): Yes   - Patient's presenting concerns require more intensive intervention than could be completed within the usual service      Progress Since Last Session (Related to Symptoms / Goals / Homework):   Symptoms: No change in symptoms, pt reports that things are about the same as last time, but presented with a better mood    Homework: Achieved / completed to satisfaction      Episode of Care Goals: Satisfactory progress - ACTION (Actively working towards change); Intervened by reinforcing change plan / affirming steps taken     Current / Ongoing Stressors and  "Concerns:   Pt is currently seeking therapy related to depression and anxiety around self-exploration related to his sexual identity (bi-sexual, possibly homosexual), and has some lingering shame around his sexual identity, which he is still trying to figure out. Pt grew up in a very conservative [place] home, where same-sex relations were not supported, which has caused him to have an internal struggle between perceived expectations and his desire to be authentic. Since last session, pt reported that he has been having an increasingly difficult time at work with a bunch of people he likes having left or being let go of. Pt processed through wanting to quit his job and the challenges that his job stress is causing in his life. Pt expressed that he doesn't feel comfortable using FMLA, as he is certain that he wouldn't return to his job and that it would just delay or make worse how he is currently feeling. 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 After discussing it at length, pt verbalized feeling somewhat better and plans to talk with his boss to get an end date of when he will plan to leave his job. Pt thinks that having this deadline will be motivating for him to apply for another job, as he has felt stuck. Pt verbalized thoughts and feelings about his work situation compounding his other issues related to feeling like most other aspects of his life isn't working well either, including trying to navigates the sexual identity stuff. Pt did report that he has been spending less time with family, which has been a \"nice break\" for him, and been spending more time with friends which has been helpful. Pt also watch the [employer], and felt that it was helpful in recontextualizing LGBT views of others and recognize the progress made in such a short period. Pt reviewed the work he has done on resolving his career " challenges, including past homework that he was assigned and completed, recognizing that he has made progress on this he initially discounted. Pt also acknowledged that he needs to work on being more self-positive and recognize the efforts he has made, which continues to be a struggle. Due to the complex nature of today's conversation and the impact it would have on pt's ability to receive supports due to loosing insurance, the session went longer then anticipated.     Topic for the future: second round of dating with ex-girlfriend; reconceptualized perspective on LGBT stuff after watching [employer].    Current coping skills-  Music  Breathing technique  D8 pen (vape) - unhelpful  Eating - unhelpful  Socializing  Reading to escape (games books dnd) - unhelpful in excess   exerciseing  Singing (car singing)  Staying busy at work a lot with low-priority or off tasks - unhelpful?    Look up LGBT people who are surprising to pt, and bring 3 examples - openly singh [employer] member, music artists, j[employer] youtube channel: middle ground   Make list of 5 potential jobs or job fields you may want to go into - doesn't feel he has the confidence to apply for some jobs due to internalizing some feelings of past failure.     For next time, talk about staying healthy and habit forming skills, and wanting to not use delta 8 to manage anxiety (helpful vs. Unhelpful coping skills)     Treatment Objective(s) Addressed in This Session:   identify current thoughts, triggers, and stressors which contribute to feelings of depression  identify unhelpful or unrealistic fears / thoughts that contribute to feeling anxious  Decrease frequency and intensity of feeling down, depressed, hopeless  Identify negative self-talk and behaviors: challenge core beliefs, myths, and actions  identify coping strategies for improving mood  identify two areas of life that you would like to have improved functioning     Intervention:   CBT: Discussed how  feelings aren't facts and that making changes in a progressive way can be more helpful the sudden major ones.    Motivational Interviewing    MI Intervention: Expressed Empathy/Understanding, Supported Autonomy, Collaboration, Evocation, Permission to raise concern or advise, Open-ended questions, Reflections: simple and complex, Change talk (evoked) and Reframe     Change Talk Expressed by the Patient: Ability to change Reasons to change Need to change Activation Taking steps    Provider Response to Change Talk: E - Evoked more info from patient about behavior change, A - Affirmed patient's thoughts, decisions, or attempts at behavior change, R - Reflected patient's change talk and S - Summarized patient's change talk statements    Psychodynamic: Processed internal-experiences related to sexual identity and work related stress/disastifaction  Solution Focused: Identified areas in which pt is self-imposing barriers and possible strategies to overcome them.    Assessments completed prior to visit:  PHQ2:   PHQ-2 ( 1999 [employer]) 3/18/2022 12/14/2021 8/4/2016   Q1: Little interest or pleasure in doing things 1 1 0   Q2: Feeling down, depressed or hopeless 1 1 0   PHQ-2 Score 2 2 0   Q1: Little interest or pleasure in doing things Several days Several days -   Q2: Feeling down, depressed or hopeless Several days Several days -   PHQ-2 Score 2 2 -     PHQ9:   PHQ-9 SCORE 7/13/2015 12/3/2021 1/24/2022 2/21/2022   PHQ-9 Total Score 17 - - -   PHQ-9 Total Score MyChart - 6 (Mild depression) - -   PHQ-9 Total Score - 6 8 6     GAD2:   MILAD-2 12/14/2021 3/18/2022   Feeling nervous, anxious, or on edge 1 1   Not being able to stop or control worrying 1 1   MILAD-2 Total Score 2 2     GAD7:   MILAD-7 SCORE 12/3/2021 1/24/2022 2/21/2022   Total Score 5 (mild anxiety) - -   Total Score 5 7 8     PROMIS 10-Global Health (only subscores and total score):   PROMIS-10 Scores Only 12/14/2021 2/21/2022 3/18/2022   Global Mental Health Score  "10 14 11   Global Physical Health Score 14 17 16   PROMIS TOTAL - SUBSCORES 24 31 27         ASSESSMENT: Current Emotional / Mental Status (status of significant symptoms):   Risk status (Self / Other harm or suicidal ideation)   Patient denies current fears or concerns for personal safety.   Patient denies current or recent suicidal ideation or behaviors.   Patient denies current or recent homicidal ideation or behaviors.   Patient denies current or recent self injurious behavior or ideation.   Patient denies other safety concerns.   Patient reports there has been no change in risk factors since their last session.     Patient reports there has been no change in protective factors since their last session.     Recommended that patient call 911 or go to the local ED should there be a change in any of these risk factors.     Appearance:   Appropriate    Eye Contact:   Good    Psychomotor Behavior: Normal    Attitude:   Cooperative  Interested Friendly Pleasant   Orientation:   All   Speech    Rate / Production: Normal/ Responsive    Volume:  Normal    Mood:    Depressed  Normal   Affect:    Appropriate  Blunted    Thought Content:  Clear    Thought Form:  Coherent  Logical    Insight:    Fair  and External locus     Medication Review:   No changes to current psychiatric medication(s)     Medication Compliance:   Yes     Changes in Health Issues:   None reported     Chemical Use Review:   Substance Use: Chemical use reviewed, no active concerns identified      Tobacco Use: No current tobacco use.      Diagnosis:  1. Adjustment disorder with mixed anxiety and depressed mood        Collateral Reports Completed:   Not Applicable    PLAN: (Patient Tasks / Therapist Tasks / Other)  Continue with Compliment and/or just say \"I am just doing it for myself\" and not self-depricating.  Have conversation about leaving with supervisor  Apply for three jobs before next session    Keaton Stein [facility]                                  "                      ______________________________________________________________________    Individual Treatment Plan    Patient's Name: Jamari Cota  YOB: 2000    Date of Creation: 3/4/22  Date Treatment Plan Last Reviewed/Revised: 3/4/22    DSM5 Diagnoses: Adjustment Disorders  309.28 (F43.23) With mixed anxiety and depressed mood  Psychosocial / Contextual Factors: concerns about acceptance by family, pt has started dating, financial stressors  PROMIS (reviewed every 90 days):   PROMIS-10 Scores             31    Referral / Collaboration:  Referral to another professional/service is not indicated at this time..    Anticipated number of session for this episode of care: 8-13  Anticipation frequency of session: Biweekly  Anticipated Duration of each session: 38-52 minutes  Treatment plan will be reviewed in 90 days or when goals have been changed.       MeasurableTreatment Goal(s) related to diagnosis / functional impairment(s)  Goal 1: Patient will develop helpful coping skills, while learning to recognize unhelpful ones, and increase/expand his support system.     I will know I've met my goal when I am more able to feel more confident in myself and decisions/presetation.     Objective #A (Patient Action)    Patient will identify helpful vs unhelpful coping strategies to more effectively address stressors  exercise for 30 minutes 3 times per week for 30 minutes  Decrease frequency and intensity of feeling down, depressed, hopeless  Improve diet, appetite, mindful eating, and / or meal planning  Identify negative self-talk and behaviors: challenge core beliefs, myths, and actions  identify 5 coping strategies for improving mood  practice one mindfulness skill each day for 5-10 minutes.  Status: New - Date: 3/4/22     Intervention(s)  Therapist will assign homework related to target objective with the intent to promote pt autonomy and better manage MH.  Facilitate increase in  "self-awareness  Provide psycho-education on mindfulness and helpful vs. unhelpful coping identification  Teach/promote utilization of coping skill development/exploration and mindfulness techniques    Objective #B Pt will learn how to develop and identify a helpful support system.  Patient will participate in social or interpersonal activities to improve mood  attend and participate in social or recreational activities to expand social network/support  Increase interest, engagement, and pleasure in doing things  Identify negative self-talk and behaviors: challenge core beliefs, myths, and actions  track and record at least 3 pleasant exchanges with friends and new people.  Status: New - Date: 3/4/22     Intervention(s)  Therapist will assign homework related to target objective with the intent to promote pt autonomy and better manage MH.  Facilitate increase in self-awareness  Provide psycho-education on social distortions/fallacies and boundaries  Teach/promote utilization of social engagement skills and problem solving    Goal 2: Patient will be more able to engage socially and feel less anxious when interacting in a group setting.    I will know I've met my goal when I am able to be less stressed/anxious in social interactions, especially in group settings/activities.      Objective #A (Patient Action)    Status: New - Date: 3/4/22     Patient will identify and compliment positives at least 3 times daily to support positive self-image  identify unhelpful or unrealistic thoughts/concerns and stressors which contribute to feelings of anxiety  learn and demonstrate pro-social and communication assertiveness skill(s)  initiate 3 social contact(s) per day for increasing lengths of time  practice setting boundaries 3 times in the next several weeks  accept compliment with a \"thank you\" and no self deprecating comments.    Intervention(s)  Therapist will assign homework related to target objective with the intent to " promote pt autonomy and better manage MH.  Facilitate increase in self-awareness  Provide psycho-education on social anxiety thinking and communication styles  Teach/promote utilization of thought challenging and self-confidence building    Goal 3: Patient will work on processing through self-identity and how to express his authentic or genuine self.     I will know I've met my goal when I feel more comfortable being my authentic or genuine self.      Objective #A (Patient Action)    Status: New - Date: 3/4/22     Patient will identify communication strategies to more effectively address stressors  use thought-stopping strategy daily to reduce intrusive thoughts  Identify negative self-talk and behaviors: challenge core beliefs, myths, and actions  identify how the use of substances contributes to the avoidance of feeling associated with the loss  identify the time in your life when you began to feel poorly about themselves.  Use positive self-affirmation daily to promote self-acceptance.    Intervention(s)  Therapist will assign homework related to target objective with the intent to promote pt autonomy and better manage MH.  Facilitate increase in self-awareness  Provide psycho-education on self-compassion and anticipatory grief and avoidance  Teach/promote utilization of self-care/acceptance and processing through internal-experiences    Patient has reviewed and agreed to the above plan.      Keaton Stein  [facility]  March 4, 2022

## 2022-06-10 ENCOUNTER — VIRTUAL VISIT (OUTPATIENT)
Dept: BEHAVIORAL HEALTH | Facility: HOSPITAL | Age: 22
End: 2022-06-10
Payer: COMMERCIAL

## 2022-06-10 DIAGNOSIS — F43.23 ADJUSTMENT DISORDER WITH MIXED ANXIETY AND DEPRESSED MOOD: Primary | ICD-10-CM

## 2022-06-10 PROCEDURE — 90834 PSYTX W PT 45 MINUTES: CPT | Mod: 95 | Performed by: COUNSELOR

## 2022-06-10 NOTE — PROGRESS NOTES
[facility]                                     Progress Note    Patient Name: Jamari Cota  Date: 6/10/22         Service Type: Individual      Session Start Time: 11:10 am  Session End Time: 11:58 pm     Session Length: 48 minutes    Session #: 8    Attendees: Client attended alone    Service Modality:  Video Visit:      Provider verified identity through the following two step process.  Patient provided:  Patient is known previously to provider    Telemedicine Visit: The patient's condition can be safely assessed and treated via synchronous audio and visual telemedicine encounter.      Reason for Telemedicine Visit: Services only offered telehealth    Originating Site (Patient Location): Patient's home    Distant Site (Provider Location): Provider Remote Setting- Home Office    Consent:  The patient/guardian has verbally consented to: the potential risks and benefits of telemedicine (video visit) versus in person care; bill my insurance or make self-payment for services provided; and responsibility for payment of non-covered services.     Patient would like the video invitation sent by:  My Chart    Mode of Communication:  Video Conference via [employer]    As the provider I attest to compliance with applicable laws and regulations related to telemedicine.    DATA  Interactive Complexity: No  Crisis: No  Extended Session (53+ minutes): No      Progress Since Last Session (Related to Symptoms / Goals / Homework):   Symptoms: Worsening increased MDD symptoms    Homework: Partially completed      Episode of Care Goals: Satisfactory progress - PREPARATION (Decided to change - considering how); Intervened by negotiating a change plan and determining options / strategies for behavior change, identifying triggers, exploring social supports, and working towards setting a date to begin behavior change     Current / Ongoing Stressors and Concerns:   Pt is currently seeking therapy related to depression  "and anxiety around self-exploration related to his sexual identity (bi-sexual, possibly homosexual), and has some lingering shame around his sexual identity, which he is still trying to figure out. Pt grew up in a very conservative Buddhist home, where same-sex relations were not supported, which has caused him to have an internal struggle between perceived expectations and his desire to be authentic. Since last session, pt reported that thing have been going okay, and that he did talk with his manager about leaving his job, identifying the end of next month as his hard end date. Pt reported that since getting this hard end date that it has felt like a weight has been lifted and is looking forward to being done in his current job. Pt processed through his current down swing in his mood, expressing having low energy/motivation and feelings that nothing matters. Pt has been continuing to put in effort to make better food choices, but verbalized that it hasn't been improving his mood. Pt has also not been working out recently, describing a sense of resentment or anger towards it, as it doesn't help him feel better in the moment. Pt processed through his current downswing and is potential coping skills, recognizing his thoughts of not wanting to \"spoil\" or taint those coping skills by associating them with his current mood. Behavior activation was reviewed and pt was able to conceptualize it as constructively giving into impulsiveness to do things. Pt processed through how he also recently has been dealing with feeling that he has been unable to be his genuine self and that thinking about what could happen if he came out, he has been feeling like he is stuck. At end of therapy pt expressed feeling better being able to vent and just talk about his feelings of depression. Pt verbalized wanting to go to a weekly schedule if possible as he felt he thinks he needs more support.     Topic for the future: second round of " dating with ex-girlfriend; reconceptualized perspective on LGBT stuff after watching [employer].    Current coping skills-  Music  Breathing technique  D8 pen (vape) - unhelpful  Eating - unhelpful  Socializing  Reading to escape (games books dnd) - unhelpful in excess   exerciseing  Singing (car singing)  Staying busy at work a lot with low-priority or off tasks - unhelpful?    Look up LGBT people who are surprising to pt, and bring 3 examples - openly singh [employer] member, music artists, j[employer] youtube channel: middle ground   Make list of 5 potential jobs or job fields you may want to go into - doesn't feel he has the confidence to apply for some jobs due to internalizing some feelings of past failure.     For next time, talk about staying healthy and habit forming skills, and wanting to not use delta 8 to manage anxiety (helpful vs. Unhelpful coping skills)     Treatment Objective(s) Addressed in This Session:   identify current thoughts, triggers, and stressors which contribute to feelings of depression  identify unhelpful or unrealistic fears / thoughts that contribute to feeling anxious  Increase interest, engagement, and pleasure in doing things  Decrease frequency and intensity of feeling down, depressed, hopeless  Feel less tired and more energy during the day   Identify negative self-talk and behaviors: challenge core beliefs, myths, and actions  identify coping strategies for improving mood  identify two areas of life that you would like to have improved functioning     Intervention:   CBT: Discussed how feelings aren't facts and that making changes in a progressive way can be more helpful the sudden major ones.    Motivational Interviewing    MI Intervention: Expressed Empathy/Understanding, Supported Autonomy, Collaboration, Evocation, Open-ended questions, Reflections: simple and complex and Change talk (evoked)     Change Talk Expressed by the Patient: Desire to change Activation    Provider  Response to Change Talk: E - Evoked more info from patient about behavior change, A - Affirmed patient's thoughts, decisions, or attempts at behavior change, R - Reflected patient's change talk and S - Summarized patient's change talk statements    Psychodynamic: Processed internal-experiences related to sexual identity and work related stress/disastifaction  Solution Focused: Identified areas in which pt is self-imposing barriers and possible strategies to overcome them.    Assessments completed prior to visit:  PHQ2:   PHQ-2 ( 1999 [employer]) 3/18/2022 12/14/2021 8/4/2016   Q1: Little interest or pleasure in doing things 1 1 0   Q2: Feeling down, depressed or hopeless 1 1 0   PHQ-2 Score 2 2 0   Q1: Little interest or pleasure in doing things Several days Several days -   Q2: Feeling down, depressed or hopeless Several days Several days -   PHQ-2 Score 2 2 -     PHQ9:   PHQ-9 SCORE 7/13/2015 12/3/2021 1/24/2022 2/21/2022   PHQ-9 Total Score 17 - - -   PHQ-9 Total Score MyChart - 6 (Mild depression) - -   PHQ-9 Total Score - 6 8 6     GAD2:   MILAD-2 12/14/2021 3/18/2022   Feeling nervous, anxious, or on edge 1 1   Not being able to stop or control worrying 1 1   MILAD-2 Total Score 2 2     GAD7:   MILAD-7 SCORE 12/3/2021 1/24/2022 2/21/2022   Total Score 5 (mild anxiety) - -   Total Score 5 7 8     PROMIS 10-Global Health (only subscores and total score):   PROMIS-10 Scores Only 12/14/2021 2/21/2022 3/18/2022   Global Mental Health Score 10 14 11   Global Physical Health Score 14 17 16   PROMIS TOTAL - SUBSCORES 24 31 27         ASSESSMENT: Current Emotional / Mental Status (status of significant symptoms):   Risk status (Self / Other harm or suicidal ideation)   Patient denies current fears or concerns for personal safety.   Patient denies current or recent suicidal ideation or behaviors.   Patient denies current or recent homicidal ideation or behaviors.   Patient denies current or recent self injurious behavior or  ideation.   Patient denies other safety concerns.   Patient reports there has been no change in risk factors since their last session.     Patient reports there has been no change in protective factors since their last session.     Recommended that patient call 911 or go to the local ED should there be a change in any of these risk factors.     Appearance:   Appropriate    Eye Contact:   Good    Psychomotor Behavior: Normal    Attitude:   Cooperative  Interested Friendly Pleasant   Orientation:   All   Speech    Rate / Production: Normal/ Responsive    Volume:  Normal    Mood:    Depressed  Normal   Affect:    Appropriate  Blunted    Thought Content:  Clear    Thought Form:  Coherent  Logical    Insight:    Fair      Medication Review:   No changes to current psychiatric medication(s)     Medication Compliance:   Yes     Changes in Health Issues:   None reported     Chemical Use Review:   Substance Use: Chemical use reviewed, no active concerns identified      Tobacco Use: No current tobacco use.      Diagnosis:  1. Adjustment disorder with mixed anxiety and depressed mood        Collateral Reports Completed:   Not Applicable    PLAN: (Patient Tasks / Therapist Tasks / Other)  Make a list of potential interests or hobbies you may want to try  Talk with supervisor about potentially getting help with new jobs opportunity  [employer]    Keaton Stein [facility]                                                       ______________________________________________________________________    Individual Treatment Plan    Patient's Name: Jamari Cota  YOB: 2000    Date of Creation: 3/4/22  Date Treatment Plan Last Reviewed/Revised: 6/10/22    DSM5 Diagnoses: Adjustment Disorders  309.28 (F43.23) With mixed anxiety and depressed mood  Psychosocial / Contextual Factors: concerns about acceptance by family, pt has started dating, financial stressors  PROMIS (reviewed every 90 days):   PROMIS-10  Scores  PROMIS-10 Scores    PROMIS-10 Total Score w/o Sub Scores 7/1/2022 7/15/2022 7/15/2022   PROMIS TOTAL - SUBSCORES 28 28 28         Referral / Collaboration:  Referral to another professional/service is not indicated at this time..    Anticipated number of session for this episode of care: 8-13  Anticipation frequency of session: Biweekly  Anticipated Duration of each session: 38-52 minutes  Treatment plan will be reviewed in 90 days or when goals have been changed.       MeasurableTreatment Goal(s) related to diagnosis / functional impairment(s)  Goal 1: Patient will develop helpful coping skills, while learning to recognize unhelpful ones, and increase/expand his support system.     I will know I've met my goal when I am more able to feel more confident in myself and decisions/presetation.     Objective #A (Patient Action)    Patient will identify helpful vs unhelpful coping strategies to more effectively address stressors  exercise for 30 minutes 3 times per week for 30 minutes  Decrease frequency and intensity of feeling down, depressed, hopeless  Improve diet, appetite, mindful eating, and / or meal planning  Identify negative self-talk and behaviors: challenge core beliefs, myths, and actions  identify 5 coping strategies for improving mood  practice one mindfulness skill each day for 5-10 minutes.  Status: Continued - Date(s): 6/10/22     Intervention(s)  Therapist will assign homework related to target objective with the intent to promote pt autonomy and better manage MH.  Facilitate increase in self-awareness  Provide psycho-education on mindfulness and helpful vs. unhelpful coping identification  Teach/promote utilization of coping skill development/exploration and mindfulness techniques    Objective #B Pt will learn how to develop and identify a helpful support system.  Patient will participate in social or interpersonal activities to improve mood  attend and participate in social or recreational  "activities to expand social network/support  Increase interest, engagement, and pleasure in doing things  Identify negative self-talk and behaviors: challenge core beliefs, myths, and actions  track and record at least 3 pleasant exchanges with friends and new people.  Status: Continued - Date(s): 6/10/22     Intervention(s)  Therapist will assign homework related to target objective with the intent to promote pt autonomy and better manage MH.  Facilitate increase in self-awareness  Provide psycho-education on social distortions/fallacies and boundaries  Teach/promote utilization of social engagement skills and problem solving    Goal 2: Patient will be more able to engage socially and feel less anxious when interacting in a group setting.    I will know I've met my goal when I am able to be less stressed/anxious in social interactions, especially in group settings/activities.      Objective #A (Patient Action)    Status: Continued - Date(s): 6/10/22     Patient will identify and compliment positives at least 3 times daily to support positive self-image  identify unhelpful or unrealistic thoughts/concerns and stressors which contribute to feelings of anxiety  learn and demonstrate pro-social and communication assertiveness skill(s)  initiate 3 social contact(s) per day for increasing lengths of time  practice setting boundaries 3 times in the next several weeks  accept compliment with a \"thank you\" and no self deprecating comments.    Intervention(s)  Therapist will assign homework related to target objective with the intent to promote pt autonomy and better manage MH.  Facilitate increase in self-awareness  Provide psycho-education on social anxiety thinking and communication styles  Teach/promote utilization of thought challenging and self-confidence building    Goal 3: Patient will work on processing through self-identity and how to express his authentic or genuine self.     I will know I've met my goal when I " feel more comfortable being my authentic or genuine self.      Objective #A (Patient Action)    Status: Continued - Date(s): 6/10/22     Patient will identify communication strategies to more effectively address stressors  use thought-stopping strategy daily to reduce intrusive thoughts  Identify negative self-talk and behaviors: challenge core beliefs, myths, and actions  identify how the use of substances contributes to the avoidance of feeling associated with the loss  identify the time in your life when you began to feel poorly about themselves.  Use positive self-affirmation daily to promote self-acceptance.    Intervention(s)  Therapist will assign homework related to target objective with the intent to promote pt autonomy and better manage MH.  Facilitate increase in self-awareness  Provide psycho-education on self-compassion and anticipatory grief and avoidance  Teach/promote utilization of self-care/acceptance and processing through internal-experiences    Patient has reviewed and agreed to the above plan.      Keaton Stein  Psychiatric  March 4, 2022

## 2022-06-17 ENCOUNTER — VIRTUAL VISIT (OUTPATIENT)
Dept: BEHAVIORAL HEALTH | Facility: HOSPITAL | Age: 22
End: 2022-06-17
Payer: COMMERCIAL

## 2022-06-17 DIAGNOSIS — F43.23 ADJUSTMENT DISORDER WITH MIXED ANXIETY AND DEPRESSED MOOD: Primary | ICD-10-CM

## 2022-06-17 PROCEDURE — 90834 PSYTX W PT 45 MINUTES: CPT | Mod: 95 | Performed by: COUNSELOR

## 2022-06-17 NOTE — PROGRESS NOTES
[facility]                                     Progress Note    Patient Name: Jamari Cota  Date: 6/17/22         Service Type: Individual      Session Start Time: 1:03 pm  Session End Time: 1:55 pm     Session Length: 52 minutes    Session #: 9    Attendees: Client attended alone    Service Modality:  Video Visit:      Provider verified identity through the following two step process.  Patient provided:  Patient is known previously to provider    Telemedicine Visit: The patient's condition can be safely assessed and treated via synchronous audio and visual telemedicine encounter.      Reason for Telemedicine Visit: Services only offered telehealth    Originating Site (Patient Location): Patient's home    Distant Site (Provider Location): Provider Remote Setting- Home Office    Consent:  The patient/guardian has verbally consented to: the potential risks and benefits of telemedicine (video visit) versus in person care; bill my insurance or make self-payment for services provided; and responsibility for payment of non-covered services.     Patient would like the video invitation sent by:  My Chart    Mode of Communication:  Video Conference via [employer]well    As the provider I attest to compliance with applicable laws and regulations related to telemedicine.    DATA  Interactive Complexity: No  Crisis: No  Extended Session (53+ minutes): No      Progress Since Last Session (Related to Symptoms / Goals / Homework):   Symptoms: Worsening increased MDD symptoms    Homework: Partially completed      Episode of Care Goals: Satisfactory progress - PREPARATION (Decided to change - considering how); Intervened by negotiating a change plan and determining options / strategies for behavior change, identifying triggers, exploring social supports, and working towards setting a date to begin behavior change     Current / Ongoing Stressors and Concerns:   Pt is currently seeking therapy related to depression  "and anxiety around self-exploration related to his sexual identity (bi-sexual, possibly homosexual), and has some lingering shame around his sexual identity, which he is still trying to figure out. Pt grew up in a very conservative Orthodox home, where same-sex relations were not supported, which has caused him to have an internal struggle between perceived expectations and his desire to be authentic. Since last session, pt reported that his uncle needs surgery and is unable to have it due to respiratory issues and this could have a fatal outcome. Pt processed through his recent feelings of sadness and frustration. Upon reflection pt identified feeling constantly fatigued and unable to support himself as primary sources of this. Pt discussed how his family puts a lot on him, and that he feels he is unable to have his own struggles by comparison or that his issues don't matter. Pt also identified that his family has their own mental health, but unlike him either don't realize it or refuse to recognize it so they won't get professional help like he does. Pt processed through how this makes him feel and has caused him to develop patterns of over-generalizing/minimizing his thought/feelings. Furthermore, pt discussed how he has trust issues and does not feel comfortable or able to reach out for support from others, instead relying on himself. By end of session pt was feeling better and admitted that part of what was going on was he was \"talking himself\" into being sad. Pt acknowledges he does feel depressed and has a lot of stressors currently contributing; therapist (writer) observed pt describing a pattern of cognitive distortions exacerbating his symptoms. Pt also identified what he wants to do and feels like he is stuck (ie. not doing it).    Wants to do:  Job Search  [employer]    Topic for the future: second round of dating with ex-girlfriend; normalizing LGBT through media exposure; Cognitive distortions. " wanting to not use delta 8 to manage anxiety (helpful vs. Unhelpful coping skills)    Current coping skills-  Music  Breathing technique  D8 pen (vape) - unhelpful  Eating - unhelpful  Socializing  Reading to escape (games books dnd) - unhelpful in excess   Exercising  Singing (car singing)  Staying busy at work a lot with low-priority or off tasks - unhelpful    Look up LGBT people who are surprising to pt, and bring 3 examples - openly singh [employer] member, music artists, [employer] youtube channel: middle ground   Make list of 5 potential jobs or job fields you may want to go into - doesn't feel he has the confidence to apply for some jobs due to internalizing some feelings of past failure.      Treatment Objective(s) Addressed in This Session:   identify current thoughts, triggers, and stressors which contribute to feelings of depression  identify unhelpful or unrealistic fears / thoughts that contribute to feeling anxious  Increase interest, engagement, and pleasure in doing things  Decrease frequency and intensity of feeling down, depressed, hopeless  Feel less tired and more energy during the day   Identify negative self-talk and behaviors: challenge core beliefs, myths, and actions  identify coping strategies for improving mood  identify two areas of life that you would like to have improved functioning     Intervention:   CBT: reflected on current interplay between feelings and thoughts and how they impact our perception of the world around and inside us.    Motivational Interviewing    MI Intervention: Expressed Empathy/Understanding, Supported Autonomy, Collaboration, Evocation, Open-ended questions, Reflections: simple and complex and Change talk (evoked)     Change Talk Expressed by the Patient: Desire to change Activation    Provider Response to Change Talk: E - Evoked more info from patient about behavior change, A - Affirmed patient's thoughts, decisions, or attempts at behavior change, R - Reflected  patient's change talk and S - Summarized patient's change talk statements    Psychodynamic: Processed internal-experiences related to trust issues and being able to support only on himself  Solution Focused: Identified areas in which pt is self-imposing barriers and possible strategies to overcome them.    Assessments completed prior to visit:  PHQ2:   PHQ-2 ( 1999 [employer]) 3/18/2022 12/14/2021 8/4/2016   Q1: Little interest or pleasure in doing things 1 1 0   Q2: Feeling down, depressed or hopeless 1 1 0   PHQ-2 Score 2 2 0   Q1: Little interest or pleasure in doing things Several days Several days -   Q2: Feeling down, depressed or hopeless Several days Several days -   PHQ-2 Score 2 2 -     PHQ9:   PHQ-9 SCORE 7/13/2015 12/3/2021 1/24/2022 2/21/2022   PHQ-9 Total Score 17 - - -   PHQ-9 Total Score MyChart - 6 (Mild depression) - -   PHQ-9 Total Score - 6 8 6     GAD2:   MILAD-2 12/14/2021 3/18/2022   Feeling nervous, anxious, or on edge 1 1   Not being able to stop or control worrying 1 1   MILAD-2 Total Score 2 2     GAD7:   MILAD-7 SCORE 12/3/2021 1/24/2022 2/21/2022   Total Score 5 (mild anxiety) - -   Total Score 5 7 8     PROMIS 10-Global Health (only subscores and total score):   PROMIS-10 Scores Only 12/14/2021 2/21/2022 3/18/2022   Global Mental Health Score 10 14 11   Global Physical Health Score 14 17 16   PROMIS TOTAL - SUBSCORES 24 31 27         ASSESSMENT: Current Emotional / Mental Status (status of significant symptoms):   Risk status (Self / Other harm or suicidal ideation)   Patient denies current fears or concerns for personal safety.   Patient denies current or recent suicidal ideation or behaviors.   Patient denies current or recent homicidal ideation or behaviors.   Patient denies current or recent self injurious behavior or ideation.   Patient denies other safety concerns.   Patient reports there has been no change in risk factors since their last session.     Patient reports there has been no  "change in protective factors since their last session.     Recommended that patient call 911 or go to the local ED should there be a change in any of these risk factors.     Appearance:   Appropriate    Eye Contact:   Good    Psychomotor Behavior: Normal    Attitude:   Cooperative  Interested Friendly Pleasant   Orientation:   All   Speech    Rate / Production: Normal/ Responsive    Volume:  Normal    Mood:    Depressed  Normal   Affect:    Appropriate  Blunted    Thought Content:  Clear    Thought Form:  Coherent  Logical    Insight:    Fair      Medication Review:   No changes to current psychiatric medication(s)     Medication Compliance:   Yes     Changes in Health Issues:   None reported     Chemical Use Review:   Substance Use: Chemical use reviewed, no active concerns identified      Tobacco Use: No current tobacco use.      Diagnosis:  1. Adjustment disorder with mixed anxiety and depressed mood        Collateral Reports Completed:   Not Applicable    PLAN: (Patient Tasks / Therapist Tasks / Other)  Put up \"things can change\" notes/paper around apartment    Follow-up on next time:  Make a list of potential interests or hobbies you may want to try  Talk with supervisor about potentially getting help with new jobs opportunity  [employer] on [employer]    Keaton Stein [facility]                                                       ______________________________________________________________________    Individual Treatment Plan    Patient's Name: Jamari Cota  YOB: 2000    Date of Creation: 3/4/22  Date Treatment Plan Last Reviewed/Revised: 6/10/22    DSM5 Diagnoses: Adjustment Disorders  309.28 (F43.23) With mixed anxiety and depressed mood  Psychosocial / Contextual Factors: concerns about acceptance by family, pt has started dating, financial stressors  PROMIS (reviewed every 90 days):   PROMIS-10 Scores             31    Referral / Collaboration:  Referral to another professional/service " is not indicated at this time..    Anticipated number of session for this episode of care: 8-13  Anticipation frequency of session: Biweekly  Anticipated Duration of each session: 38-52 minutes  Treatment plan will be reviewed in 90 days or when goals have been changed.       MeasurableTreatment Goal(s) related to diagnosis / functional impairment(s)  Goal 1: Patient will develop helpful coping skills, while learning to recognize unhelpful ones, and increase/expand his support system.     I will know I've met my goal when I am more able to feel more confident in myself and decisions/presetation.     Objective #A (Patient Action)    Patient will identify helpful vs unhelpful coping strategies to more effectively address stressors  exercise for 30 minutes 3 times per week for 30 minutes  Decrease frequency and intensity of feeling down, depressed, hopeless  Improve diet, appetite, mindful eating, and / or meal planning  Identify negative self-talk and behaviors: challenge core beliefs, myths, and actions  identify 5 coping strategies for improving mood  practice one mindfulness skill each day for 5-10 minutes.  Status: Continued - Date(s): 6/10/22     Intervention(s)  Therapist will assign homework related to target objective with the intent to promote pt autonomy and better manage MH.  Facilitate increase in self-awareness  Provide psycho-education on mindfulness and helpful vs. unhelpful coping identification  Teach/promote utilization of coping skill development/exploration and mindfulness techniques    Objective #B Pt will learn how to develop and identify a helpful support system.  Patient will participate in social or interpersonal activities to improve mood  attend and participate in social or recreational activities to expand social network/support  Increase interest, engagement, and pleasure in doing things  Identify negative self-talk and behaviors: challenge core beliefs, myths, and actions  track and  "record at least 3 pleasant exchanges with friends and new people.  Status: Continued - Date(s): 6/10/22     Intervention(s)  Therapist will assign homework related to target objective with the intent to promote pt autonomy and better manage MH.  Facilitate increase in self-awareness  Provide psycho-education on social distortions/fallacies and boundaries  Teach/promote utilization of social engagement skills and problem solving    Goal 2: Patient will be more able to engage socially and feel less anxious when interacting in a group setting.    I will know I've met my goal when I am able to be less stressed/anxious in social interactions, especially in group settings/activities.      Objective #A (Patient Action)    Status: Continued - Date(s): 6/10/22     Patient will identify and compliment positives at least 3 times daily to support positive self-image  identify unhelpful or unrealistic thoughts/concerns and stressors which contribute to feelings of anxiety  learn and demonstrate pro-social and communication assertiveness skill(s)  initiate 3 social contact(s) per day for increasing lengths of time  practice setting boundaries 3 times in the next several weeks  accept compliment with a \"thank you\" and no self deprecating comments.    Intervention(s)  Therapist will assign homework related to target objective with the intent to promote pt autonomy and better manage MH.  Facilitate increase in self-awareness  Provide psycho-education on social anxiety thinking and communication styles  Teach/promote utilization of thought challenging and self-confidence building    Goal 3: Patient will work on processing through self-identity and how to express his authentic or genuine self.     I will know I've met my goal when I feel more comfortable being my authentic or genuine self.      Objective #A (Patient Action)    Status: Continued - Date(s): 6/10/22     Patient will identify communication strategies to more effectively " address stressors  use thought-stopping strategy daily to reduce intrusive thoughts  Identify negative self-talk and behaviors: challenge core beliefs, myths, and actions  identify how the use of substances contributes to the avoidance of feeling associated with the loss  identify the time in your life when you began to feel poorly about themselves.  Use positive self-affirmation daily to promote self-acceptance.    Intervention(s)  Therapist will assign homework related to target objective with the intent to promote pt autonomy and better manage MH.  Facilitate increase in self-awareness  Provide psycho-education on self-compassion and anticipatory grief and avoidance  Teach/promote utilization of self-care/acceptance and processing through internal-experiences    Patient has reviewed and agreed to the above plan.      Keaton Stein  [facility]  March 4, 2022

## 2022-06-24 ENCOUNTER — VIRTUAL VISIT (OUTPATIENT)
Dept: BEHAVIORAL HEALTH | Facility: HOSPITAL | Age: 22
End: 2022-06-24
Payer: COMMERCIAL

## 2022-06-24 DIAGNOSIS — F43.23 ADJUSTMENT DISORDER WITH MIXED ANXIETY AND DEPRESSED MOOD: Primary | ICD-10-CM

## 2022-06-24 PROCEDURE — 90834 PSYTX W PT 45 MINUTES: CPT | Mod: 95 | Performed by: COUNSELOR

## 2022-06-24 NOTE — PROGRESS NOTES
[facility]                                     Progress Note    Patient Name: Jamari Cota  Date: 6/24/22         Service Type: Individual      Session Start Time: 1:00 pm  Session End Time: 1:52 pm     Session Length: 52 minutes    Session #: 10    Attendees: Client attended alone    Service Modality:  Video Visit:      Provider verified identity through the following two step process.  Patient provided:  Patient is known previously to provider    Telemedicine Visit: The patient's condition can be safely assessed and treated via synchronous audio and visual telemedicine encounter.      Reason for Telemedicine Visit: Services only offered telehealth    Originating Site (Patient Location): Patient's home    Distant Site (Provider Location): Provider Remote Setting- Home Office    Consent:  The patient/guardian has verbally consented to: the potential risks and benefits of telemedicine (video visit) versus in person care; bill my insurance or make self-payment for services provided; and responsibility for payment of non-covered services.     Patient would like the video invitation sent by:  My Chart    Mode of Communication:  Video Conference via [employer]    As the provider I attest to compliance with applicable laws and regulations related to telemedicine.    DATA  Interactive Complexity: No  Crisis: No  Extended Session (53+ minutes): No      Progress Since Last Session (Related to Symptoms / Goals / Homework):   Symptoms: Improving symptoms, reduced MDD and anxiety    Homework: Achieved / completed to satisfaction      Episode of Care Goals: Satisfactory progress - ACTION (Actively working towards change); Intervened by reinforcing change plan / affirming steps taken     Current / Ongoing Stressors and Concerns:   Pt is currently seeking therapy related to depression and anxiety around self-exploration related to his sexual identity (bi-sexual, possibly homosexual), and has some lingering  "shame around his sexual identity, which he is still trying to figure out. Pt grew up in a very conservative [employer] home, where same-sex relations were not supported, which has caused him to have an internal struggle between perceived expectations and his desire to be authentic. Since last session, pt reported feeling better and thinks that he put a lot of corrective work into his thoughts. Pt spent some time introspecting on his thoughts and emotions, which he felt has helped him manage his stressors better. Pt had an internal-interview at his current work, which he thinks went well and is looking forward to his follow-up with it today. Pt processed through these updates and worked on identifying what seems to have been helpful. Pt identified that he does best when he \"is told what to do and go do that in a timeframe\", basically an outline which helps him use his reactive problem solving skills (which he feels better about) vs pro-active problem solving skills. Upon reflection pt thinks that change is energizing and that feeling/being stuck in place reinforces thoughts of things staying the same, especially when it is a situation that he doesn't like. This is why he thinks that he sometimes feels he needs to make a big/dramatic change at once in order to break those feelings of being stuck and recognize that change allows him to reprioritize things, including himself. Pt processed through this and how it applies to both current and past situations. Pt thinks that moving forward he needs to be more mindful of this thought trap and work on not \"talking himself into\" things.     Wants to do:  Job Search  [employer]anks    Topic for the future: second round of dating with ex-girlfriend; normalizing LGBT through media exposure; Cognitive distortions. wanting to not use delta 8 to manage anxiety (helpful vs. Unhelpful coping skills)    Current coping skills-  Music  Breathing technique  D8 pen (vape) - unhelpful  Eating - " "unhelpful  Socializing  Reading to escape (games books dnd) - unhelpful in excess   Exercising  Singing (car singing)  Staying busy at work a lot with low-priority or off tasks - unhelpful    Look up LGBT people who are surprising to pt, and bring 3 examples - openly singh [employer] member, music artists, [employer] youtube channel: middle ground   Make list of 5 potential jobs or job fields you may want to go into - doesn't feel he has the confidence to apply for some jobs due to internalizing some feelings of past failure.      Treatment Objective(s) Addressed in This Session:   identify current thoughts, triggers, and stressors which contribute to feelings of depression  identify unhelpful or unrealistic fears / thoughts that contribute to feeling anxious  Increase interest, engagement, and pleasure in doing things  Decrease frequency and intensity of feeling down, depressed, hopeless  Feel less tired and more energy during the day   Identify negative self-talk and behaviors: challenge core beliefs, myths, and actions  identify coping strategies for improving mood  identify two areas of life that you would like to have improved functioning     Intervention:   CBT: reflected on current interplay between feelings and thoughts and how they impact our perception of the world around and inside us.    Motivational Interviewing    MI Intervention: Expressed Empathy/Understanding, Supported Autonomy, Collaboration, Evocation, Open-ended questions, Reflections: simple and complex, Change talk (evoked) and Reframe     Change Talk Expressed by the Patient: Desire to change Ability to change Activation Taking steps    Provider Response to Change Talk: E - Evoked more info from patient about behavior change, A - Affirmed patient's thoughts, decisions, or attempts at behavior change, R - Reflected patient's change talk and S - Summarized patient's change talk statements    Psychodynamic: Processed internal-experiences related to \"talking " "himself into\" feeling or situations  Solution Focused: Identified areas in which pt is self-imposing barriers and possible strategies to overcome them.    Assessments completed prior to visit:  PHQ2:   PHQ-2 ( 1999 [employer]) 6/17/2022 3/18/2022 12/14/2021 8/4/2016   Q1: Little interest or pleasure in doing things 1 1 1 0   Q2: Feeling down, depressed or hopeless 1 1 1 0   PHQ-2 Score 2 2 2 0   Q1: Little interest or pleasure in doing things Several days Several days Several days -   Q2: Feeling down, depressed or hopeless Several days Several days Several days -   PHQ-2 Score 2 2 2 -     PHQ9:   PHQ-9 SCORE 7/13/2015 12/3/2021 1/24/2022 2/21/2022   PHQ-9 Total Score 17 - - -   PHQ-9 Total Score MyChart - 6 (Mild depression) - -   PHQ-9 Total Score - 6 8 6     GAD2:   MILAD-2 12/14/2021 3/18/2022 6/17/2022 6/17/2022   Feeling nervous, anxious, or on edge 1 1 1 1   Not being able to stop or control worrying 1 1 1 1   MILAD-2 Total Score 2 2 2 2     GAD7:   MILAD-7 SCORE 12/3/2021 1/24/2022 2/21/2022   Total Score 5 (mild anxiety) - -   Total Score 5 7 8     PROMIS 10-Global Health (only subscores and total score):   PROMIS-10 Scores Only 12/14/2021 2/21/2022 3/18/2022 6/17/2022 6/17/2022   Global Mental Health Score 10 14 11 11 11   Global Physical Health Score 14 17 16 14 14   PROMIS TOTAL - SUBSCORES 24 31 27 25 25         ASSESSMENT: Current Emotional / Mental Status (status of significant symptoms):   Risk status (Self / Other harm or suicidal ideation)   Patient denies current fears or concerns for personal safety.   Patient denies current or recent suicidal ideation or behaviors.   Patient denies current or recent homicidal ideation or behaviors.   Patient denies current or recent self injurious behavior or ideation.   Patient denies other safety concerns.   Patient reports there has been no change in risk factors since their last session.     Patient reports there has been no change in protective factors since their " last session.     Recommended that patient call 911 or go to the local ED should there be a change in any of these risk factors.     Appearance:   Appropriate    Eye Contact:   Good    Psychomotor Behavior: Normal    Attitude:   Cooperative  Interested Friendly Pleasant   Orientation:   All   Speech    Rate / Production: Normal/ Responsive    Volume:  Normal    Mood:    Depressed  Normal   Affect:    Appropriate  Bright    Thought Content:  Clear    Thought Form:  Coherent  Logical    Insight:    Fair  and Intellectual Insight     Medication Review:   No changes to current psychiatric medication(s)     Medication Compliance:   Yes     Changes in Health Issues:   None reported     Chemical Use Review:   Substance Use: Chemical use reviewed, no active concerns identified      Tobacco Use: No current tobacco use.      Diagnosis:  1. Adjustment disorder with mixed anxiety and depressed mood        Collateral Reports Completed:   Not Applicable    PLAN: (Patient Tasks / Therapist Tasks / Other)  See the trees over the forest  Work on prioritizing self   Reflect on if the self-sacrifice growing   Go see a LGTB theme play or movie or something similarly passive (extra cedit)    Follow-up on next time:  Make a list of potential interests or hobbies you may want to try    Keaton Stein [facility]                                                       ______________________________________________________________________    Individual Treatment Plan    Patient's Name: Jamari Cota  YOB: 2000    Date of Creation: 3/4/22  Date Treatment Plan Last Reviewed/Revised: 6/10/22    DSM5 Diagnoses: Adjustment Disorders  309.28 (F43.23) With mixed anxiety and depressed mood  Psychosocial / Contextual Factors: concerns about acceptance by family, pt has started dating, financial stressors  PROMIS (reviewed every 90 days):   PROMIS-10 Scores  Global Mental Health Score: (P) 11  Global Physical Health Score: (P) 14   PROMIS  TOTAL - SUBSCORES: (P) 25   31    Referral / Collaboration:  Referral to another professional/service is not indicated at this time..    Anticipated number of session for this episode of care: 8-13  Anticipation frequency of session: Biweekly  Anticipated Duration of each session: 38-52 minutes  Treatment plan will be reviewed in 90 days or when goals have been changed.       MeasurableTreatment Goal(s) related to diagnosis / functional impairment(s)  Goal 1: Patient will develop helpful coping skills, while learning to recognize unhelpful ones, and increase/expand his support system.     I will know I've met my goal when I am more able to feel more confident in myself and decisions/presetation.     Objective #A (Patient Action)    Patient will identify helpful vs unhelpful coping strategies to more effectively address stressors  exercise for 30 minutes 3 times per week for 30 minutes  Decrease frequency and intensity of feeling down, depressed, hopeless  Improve diet, appetite, mindful eating, and / or meal planning  Identify negative self-talk and behaviors: challenge core beliefs, myths, and actions  identify 5 coping strategies for improving mood  practice one mindfulness skill each day for 5-10 minutes.  Status: Continued - Date(s): 6/10/22     Intervention(s)  Therapist will assign homework related to target objective with the intent to promote pt autonomy and better manage MH.  Facilitate increase in self-awareness  Provide psycho-education on mindfulness and helpful vs. unhelpful coping identification  Teach/promote utilization of coping skill development/exploration and mindfulness techniques    Objective #B Pt will learn how to develop and identify a helpful support system.  Patient will participate in social or interpersonal activities to improve mood  attend and participate in social or recreational activities to expand social network/support  Increase interest, engagement, and pleasure in doing  "things  Identify negative self-talk and behaviors: challenge core beliefs, myths, and actions  track and record at least 3 pleasant exchanges with friends and new people.  Status: Continued - Date(s): 6/10/22     Intervention(s)  Therapist will assign homework related to target objective with the intent to promote pt autonomy and better manage MH.  Facilitate increase in self-awareness  Provide psycho-education on social distortions/fallacies and boundaries  Teach/promote utilization of social engagement skills and problem solving    Goal 2: Patient will be more able to engage socially and feel less anxious when interacting in a group setting.    I will know I've met my goal when I am able to be less stressed/anxious in social interactions, especially in group settings/activities.      Objective #A (Patient Action)    Status: Continued - Date(s): 6/10/22     Patient will identify and compliment positives at least 3 times daily to support positive self-image  identify unhelpful or unrealistic thoughts/concerns and stressors which contribute to feelings of anxiety  learn and demonstrate pro-social and communication assertiveness skill(s)  initiate 3 social contact(s) per day for increasing lengths of time  practice setting boundaries 3 times in the next several weeks  accept compliment with a \"thank you\" and no self deprecating comments.    Intervention(s)  Therapist will assign homework related to target objective with the intent to promote pt autonomy and better manage MH.  Facilitate increase in self-awareness  Provide psycho-education on social anxiety thinking and communication styles  Teach/promote utilization of thought challenging and self-confidence building    Goal 3: Patient will work on processing through self-identity and how to express his authentic or genuine self.     I will know I've met my goal when I feel more comfortable being my authentic or genuine self.      Objective #A (Patient " Action)    Status: Continued - Date(s): 6/10/22     Patient will identify communication strategies to more effectively address stressors  use thought-stopping strategy daily to reduce intrusive thoughts  Identify negative self-talk and behaviors: challenge core beliefs, myths, and actions  identify how the use of substances contributes to the avoidance of feeling associated with the loss  identify the time in your life when you began to feel poorly about themselves.  Use positive self-affirmation daily to promote self-acceptance.    Intervention(s)  Therapist will assign homework related to target objective with the intent to promote pt autonomy and better manage MH.  Facilitate increase in self-awareness  Provide psycho-education on self-compassion and anticipatory grief and avoidance  Teach/promote utilization of self-care/acceptance and processing through internal-experiences    Patient has reviewed and agreed to the above plan.      Keaton Stein  [facility]  March 4, 2022

## 2022-07-01 ENCOUNTER — VIRTUAL VISIT (OUTPATIENT)
Dept: BEHAVIORAL HEALTH | Facility: HOSPITAL | Age: 22
End: 2022-07-01
Payer: COMMERCIAL

## 2022-07-01 DIAGNOSIS — F43.23 ADJUSTMENT DISORDER WITH MIXED ANXIETY AND DEPRESSED MOOD: Primary | ICD-10-CM

## 2022-07-01 PROCEDURE — 90834 PSYTX W PT 45 MINUTES: CPT | Mod: 95 | Performed by: COUNSELOR

## 2022-07-01 NOTE — PROGRESS NOTES
[facility]                                     Progress Note    Patient Name: Jamari Cota  Date: 7/01/22         Service Type: Individual      Session Start Time: 1:01 pm  Session End Time: 1:53 pm     Session Length: 52 minutes    Session #: 11    Attendees: Client attended alone    Service Modality:  Video Visit:      Provider verified identity through the following two step process.  Patient provided:  Patient is known previously to provider    Telemedicine Visit: The patient's condition can be safely assessed and treated via synchronous audio and visual telemedicine encounter.      Reason for Telemedicine Visit: Services only offered telehealth    Originating Site (Patient Location): Patient's home    Distant Site (Provider Location): Provider Remote Setting- Home Office    Consent:  The patient/guardian has verbally consented to: the potential risks and benefits of telemedicine (video visit) versus in person care; bill my insurance or make self-payment for services provided; and responsibility for payment of non-covered services.     Patient would like the video invitation sent by:  My Chart    Mode of Communication:  Video Conference via [employer]    As the provider I attest to compliance with applicable laws and regulations related to telemedicine.    DATA  Interactive Complexity: No  Crisis: No  Extended Session (53+ minutes): No      Progress Since Last Session (Related to Symptoms / Goals / Homework):   Symptoms: Worsening symptoms, pt feel more down this week then last but better then two weeks ago    Homework: Achieved / completed to satisfaction      Episode of Care Goals: Satisfactory progress - ACTION (Actively working towards change); Intervened by reinforcing change plan / affirming steps taken     Current / Ongoing Stressors and Concerns:   Pt is currently seeking therapy related to depression and anxiety around self-exploration related to his sexual identity (bi-sexual,  possibly homosexual), and has some lingering shame around his sexual identity, which he is still trying to figure out. Pt grew up in a very conservative [employer] home, where same-sex relations were not supported, which has caused him to have an internal struggle between perceived expectations and his desire to be authentic. Since last session, pt reported he has been going out spending time with old friends and been doing a lot of programing courses for work. One of these friends is an activist in the LGBT community and has been a refreshing experience. Pt saw other friends from his highschool days which was also good, and he admitted made him feel better after the fact. Pt processed through these interactions and how he was happy to engage in it after talking about it. Pt processed through his current work situation, confirming that his unofficial interview last week went well and was informed they want to move forward with him. However, they need to post the job and go through that process, but pt is feeling overall optimistic that he will get successfully the job. Pt did say he is thinks this new job will be a good fit and has been working on becoming for familiar with it through taking some courses. Pt did think his confidence was reduced when he started the courses and felt initially a bit lost with them. Pt went to his grandmas this past week, during which he saw his uncle who does not look well and will be spend almost two week in the hospital for a procedure. Pt did mention that going to his grandma's is always somewhat stressful and his uncle was being morbid about his condition, which didn't help. When reflecting on all this pt expressed feeling that he has been pushing himself really hard so he doesn't have time or energy to think about the stressors. However, now he is starting to feel exhausted and is worried he will start to feel overwhelmed again.     Topic for the future: second round of dating with  ex-girlfriend; normalizing LGBT through media exposure; Cognitive distortions. wanting to not use delta 8 to manage anxiety (helpful vs. Unhelpful coping skills)    Current coping skills-  Music  Breathing technique  D8 pen (vape) - unhelpful  Eating - unhelpful  Socializing  Reading to escape (games books dnd) - unhelpful in excess   Exercising  Singing (car singing)  Staying busy at work a lot with low-priority or off tasks - unhelpful    Look up LGBT people who are surprising to pt, and bring 3 examples - openly singh [employer] member, music artists, j[employer] youtube channel: middle ground   Make list of 5 potential jobs or job fields you may want to go into - doesn't feel he has the confidence to apply for some jobs due to internalizing some feelings of past failure.      Treatment Objective(s) Addressed in This Session:   identify current thoughts, triggers, and stressors which contribute to feelings of depression  identify unhelpful or unrealistic fears / thoughts that contribute to feeling anxious  Increase interest, engagement, and pleasure in doing things  Decrease frequency and intensity of feeling down, depressed, hopeless  Feel less tired and more energy during the day   Identify negative self-talk and behaviors: challenge core beliefs, myths, and actions  identify coping strategies for improving mood  identify two areas of life that you would like to have improved functioning     Intervention:   CBT: reflected on current interplay between feelings and thoughts and how they impact our perception of the world around and inside us.    Motivational Interviewing    MI Intervention: Expressed Empathy/Understanding, Supported Autonomy, Collaboration, Evocation, Permission to raise concern or advise, Open-ended questions, Reflections: simple and complex and Reframe     Change Talk Expressed by the Patient: Desire to change Ability to change Need to change Committment to change Activation Taking steps    Provider  Response to Change Talk: E - Evoked more info from patient about behavior change, A - Affirmed patient's thoughts, decisions, or attempts at behavior change, R - Reflected patient's change talk and S - Summarized patient's change talk statements    Psychodynamic: Processed internal-experiences related to becoming overwhelmed and getting stuck in his own thoughts  Solution Focused: Identified areas in which pt is self-imposing barriers and possible strategies to overcome them.    Assessments completed prior to visit:  PHQ2:   PHQ-2 ( 1999 [employer]) 7/1/2022 6/17/2022 3/18/2022 12/14/2021 8/4/2016   Q1: Little interest or pleasure in doing things 1 1 1 1 0   Q2: Feeling down, depressed or hopeless 1 1 1 1 0   PHQ-2 Score 2 2 2 2 0   Q1: Little interest or pleasure in doing things Several days Several days Several days Several days -   Q2: Feeling down, depressed or hopeless Several days Several days Several days Several days -   PHQ-2 Score 2 2 2 2 -     PHQ9:   PHQ-9 SCORE 7/13/2015 12/3/2021 1/24/2022 2/21/2022   PHQ-9 Total Score 17 - - -   PHQ-9 Total Score MyChart - 6 (Mild depression) - -   PHQ-9 Total Score - 6 8 6     GAD2:   MILAD-2 12/14/2021 3/18/2022 6/17/2022 6/17/2022 7/1/2022   Feeling nervous, anxious, or on edge 1 1 1 1 1   Not being able to stop or control worrying 1 1 1 1 1   MILAD-2 Total Score 2 2 2 2 2     GAD7:   MILAD-7 SCORE 12/3/2021 1/24/2022 2/21/2022   Total Score 5 (mild anxiety) - -   Total Score 5 7 8     PROMIS 10-Global Health (only subscores and total score):   PROMIS-10 Scores Only 12/14/2021 2/21/2022 3/18/2022 6/17/2022 6/17/2022   Global Mental Health Score 10 14 11 11 11   Global Physical Health Score 14 17 16 14 14   PROMIS TOTAL - SUBSCORES 24 31 27 25 25         ASSESSMENT: Current Emotional / Mental Status (status of significant symptoms):   Risk status (Self / Other harm or suicidal ideation)   Patient denies current fears or concerns for personal safety.   Patient denies current  or recent suicidal ideation or behaviors.   Patient denies current or recent homicidal ideation or behaviors.   Patient denies current or recent self injurious behavior or ideation.   Patient denies other safety concerns.   Patient reports there has been no change in risk factors since their last session.     Patient reports there has been no change in protective factors since their last session.     Recommended that patient call 911 or go to the local ED should there be a change in any of these risk factors.     Appearance:   Appropriate    Eye Contact:   Fair    Psychomotor Behavior: Normal    Attitude:   Cooperative  Interested Friendly Pleasant   Orientation:   All   Speech    Rate / Production: Normal/ Responsive Talkative    Volume:  Normal    Mood:    Depressed  Normal   Affect:    Appropriate    Thought Content:  Clear    Thought Form:  Coherent  Logical    Insight:    Fair  and Intellectual Insight     Medication Review:   No changes to current psychiatric medication(s)     Medication Compliance:   Yes     Changes in Health Issues:   None reported     Chemical Use Review:   Substance Use: Chemical use reviewed, no active concerns identified      Tobacco Use: No current tobacco use.      Diagnosis:  1. Adjustment disorder with mixed anxiety and depressed mood        Collateral Reports Completed:   Not Applicable    PLAN: (Patient Tasks / Therapist Tasks / Other)  Continue to work on seeing the trees instead of the forest   Identify two ways to prioritize yourself   Write a letter to his mom about what is important to him and why (don't send)  Review Handouts sent through [employer], try to do the socratic questions once  Reflect on how to normalize LGBT stuff    Follow-up on next time:  Make a list of potential interests or hobbies you may want to try    Keaton Stein [facility]                                                       ______________________________________________________________________    Individual  Treatment Plan    Patient's Name: Jamari Cota  YOB: 2000    Date of Creation: 3/4/22  Date Treatment Plan Last Reviewed/Revised: 6/10/22    DSM5 Diagnoses: Adjustment Disorders  309.28 (F43.23) With mixed anxiety and depressed mood  Psychosocial / Contextual Factors: concerns about acceptance by family, pt has started dating, financial stressors  PROMIS (reviewed every 90 days):   PROMIS-10 Scores             31    Referral / Collaboration:  Referral to another professional/service is not indicated at this time..    Anticipated number of session for this episode of care: 8-13  Anticipation frequency of session: Biweekly  Anticipated Duration of each session: 38-52 minutes  Treatment plan will be reviewed in 90 days or when goals have been changed.       MeasurableTreatment Goal(s) related to diagnosis / functional impairment(s)  Goal 1: Patient will develop helpful coping skills, while learning to recognize unhelpful ones, and increase/expand his support system.     I will know I've met my goal when I am more able to feel more confident in myself and decisions/presetation.     Objective #A (Patient Action)    Patient will identify helpful vs unhelpful coping strategies to more effectively address stressors  exercise for 30 minutes 3 times per week for 30 minutes  Decrease frequency and intensity of feeling down, depressed, hopeless  Improve diet, appetite, mindful eating, and / or meal planning  Identify negative self-talk and behaviors: challenge core beliefs, myths, and actions  identify 5 coping strategies for improving mood  practice one mindfulness skill each day for 5-10 minutes.  Status: Continued - Date(s): 6/10/22     Intervention(s)  Therapist will assign homework related to target objective with the intent to promote pt autonomy and better manage MH.  Facilitate increase in self-awareness  Provide psycho-education on mindfulness and helpful vs. unhelpful coping  "identification  Teach/promote utilization of coping skill development/exploration and mindfulness techniques    Objective #B Pt will learn how to develop and identify a helpful support system.  Patient will participate in social or interpersonal activities to improve mood  attend and participate in social or recreational activities to expand social network/support  Increase interest, engagement, and pleasure in doing things  Identify negative self-talk and behaviors: challenge core beliefs, myths, and actions  track and record at least 3 pleasant exchanges with friends and new people.  Status: Continued - Date(s): 6/10/22     Intervention(s)  Therapist will assign homework related to target objective with the intent to promote pt autonomy and better manage MH.  Facilitate increase in self-awareness  Provide psycho-education on social distortions/fallacies and boundaries  Teach/promote utilization of social engagement skills and problem solving    Goal 2: Patient will be more able to engage socially and feel less anxious when interacting in a group setting.    I will know I've met my goal when I am able to be less stressed/anxious in social interactions, especially in group settings/activities.      Objective #A (Patient Action)    Status: Continued - Date(s): 6/10/22     Patient will identify and compliment positives at least 3 times daily to support positive self-image  identify unhelpful or unrealistic thoughts/concerns and stressors which contribute to feelings of anxiety  learn and demonstrate pro-social and communication assertiveness skill(s)  initiate 3 social contact(s) per day for increasing lengths of time  practice setting boundaries 3 times in the next several weeks  accept compliment with a \"thank you\" and no self deprecating comments.    Intervention(s)  Therapist will assign homework related to target objective with the intent to promote pt autonomy and better manage MH.  Facilitate increase in " self-awareness  Provide psycho-education on social anxiety thinking and communication styles  Teach/promote utilization of thought challenging and self-confidence building    Goal 3: Patient will work on processing through self-identity and how to express his authentic or genuine self.     I will know I've met my goal when I feel more comfortable being my authentic or genuine self.      Objective #A (Patient Action)    Status: Continued - Date(s): 6/10/22     Patient will identify communication strategies to more effectively address stressors  use thought-stopping strategy daily to reduce intrusive thoughts  Identify negative self-talk and behaviors: challenge core beliefs, myths, and actions  identify how the use of substances contributes to the avoidance of feeling associated with the loss  identify the time in your life when you began to feel poorly about themselves.  Use positive self-affirmation daily to promote self-acceptance.    Intervention(s)  Therapist will assign homework related to target objective with the intent to promote pt autonomy and better manage MH.  Facilitate increase in self-awareness  Provide psycho-education on self-compassion and anticipatory grief and avoidance  Teach/promote utilization of self-care/acceptance and processing through internal-experiences    Patient has reviewed and agreed to the above plan.      Keaton Stein  [facility]  March 4, 2022

## 2022-07-15 ENCOUNTER — VIRTUAL VISIT (OUTPATIENT)
Dept: BEHAVIORAL HEALTH | Facility: HOSPITAL | Age: 22
End: 2022-07-15
Attending: PSYCHIATRY & NEUROLOGY
Payer: COMMERCIAL

## 2022-07-15 DIAGNOSIS — F43.23 ADJUSTMENT DISORDER WITH MIXED ANXIETY AND DEPRESSED MOOD: Primary | ICD-10-CM

## 2022-07-15 PROCEDURE — 90837 PSYTX W PT 60 MINUTES: CPT | Mod: 95 | Performed by: COUNSELOR

## 2022-07-15 NOTE — PROGRESS NOTES
[facility]                                      Progress Note    Patient Name: Jamari Cota  Date: 7/15/22         Service Type: Individual      Session Start Time: 1:06 pm  Session End Time: 2:02 pm     Session Length: 56 minutes    Session #: 12    Attendees: Client attended alone    Service Modality:  Video Visit:      Provider verified identity through the following two step process.  Patient provided:  Patient is known previously to provider    Telemedicine Visit: The patient's condition can be safely assessed and treated via synchronous audio and visual telemedicine encounter.      Reason for Telemedicine Visit: Services only offered telehealth    Originating Site (Patient Location): Patient's other car (not moving)    Distant Site (Provider Location): Provider Remote Setting- Home Office    Consent:  The patient/guardian has verbally consented to: the potential risks and benefits of telemedicine (video visit) versus in person care; bill my insurance or make self-payment for services provided; and responsibility for payment of non-covered services.     Patient would like the video invitation sent by:  My Chart    Mode of Communication:  Video Conference via [employer]    As the provider I attest to compliance with applicable laws and regulations related to telemedicine.    DATA  Interactive Complexity: No  Crisis: No  Extended Session (53+ minutes): PROLONGED SERVICE IN THE OUTPATIENT SETTING REQUIRING DIRECT (FACE-TO-FACE) PATIENT CONTACT BEYOND THE USUAL SERVICE:    - Treatment protocol required additional time to complete, due to the nature of diagnosis being treated.  See Interventions section for details      Progress Since Last Session (Related to Symptoms / Goals / Homework):   Symptoms: Improving symptoms, pt recieved good news at work which has helped his mood    Homework: Achieved / completed to satisfaction      Episode of Care Goals: Satisfactory progress - ACTION (Actively  working towards change); Intervened by reinforcing change plan / affirming steps taken     Current / Ongoing Stressors and Concerns:   Pt is currently seeking therapy related to depression and anxiety around self-exploration related to his sexual identity (bi-sexual, possibly homosexual), and has some lingering shame around his sexual identity, which he is still trying to figure out. Pt grew up in a very conservative [employer] home, where same-sex relations were not supported, which has caused him to have an internal struggle between perceived expectations and his desire to be authentic. Since last session, pt reported that he got the new job offer at his work and will be starting this new position late next month. Pt reported that his pay will increase, he is interested in the work, and is generally excited about this change. Pt continues to engage in the coursework to get ready of the job, and he is looking forward to not having to deal with calls. Pt reported that his uncle is still alive, but not doing great still despite the procedure he had recently, and grandma is still being somewhat morbid. Pt spoke on his family's attitude towards making healthy choices/changes. Pt continues to want to support his family, but his mom has declined his offers to help financially. Pt was able to work on re-framing this scenario. Pt also talked about some social anxiety and social situations that he is navigating. Pt processed through some conflict with his room mate and how to address this. Pt is also working on trying to be more visible with LGBT self, wearing a bracelet with some pride symbollogy, and has had a good experience seeing LGBT character normalized in some media. Session went longer then anticipated due to needed to review/update pt's trx plan.     Topic for the future: second round of dating with ex-girlfriend; normalizing LGBT through media exposure; Cognitive distortions. wanting to not use delta 8 to manage  anxiety (helpful vs. Unhelpful coping skills)    Current coping skills-  Music  Breathing technique  D8 pen (vape) - unhelpful  Eating - unhelpful  Socializing  Reading to escape (games books dnd) - unhelpful in excess   Exercising  Singing (car singing)  Staying busy at work a lot with low-priority or off tasks - unhelpful    Look up LGBT people who are surprising to pt, and bring 3 examples - openly singh [employer] member, music artists, j[employer] youtube channel: middle ground   Make list of 5 potential jobs or job fields you may want to go into - doesn't feel he has the confidence to apply for some jobs due to internalizing some feelings of past failure.      Treatment Objective(s) Addressed in This Session:   identify current thoughts, triggers, and stressors which contribute to feelings of depression  identify unhelpful or unrealistic fears / thoughts that contribute to feeling anxious  Increase interest, engagement, and pleasure in doing things  Decrease frequency and intensity of feeling down, depressed, hopeless  Feel less tired and more energy during the day   Identify negative self-talk and behaviors: challenge core beliefs, myths, and actions  identify coping strategies for improving mood  identify two areas of life that you would like to have improved functioning     Intervention:   CBT: Discussed the impact of his thought process on his mood and how his work environment has been more challenging for his MH then he realized.   Psychodynamic: Processed internal-experiences related to being more LGBT visible   Solution Focused: Identified areas in which pt is self-imposing barriers and possible strategies to overcome them.    Motivational Interviewing    MI Intervention: Co-Developed Goal: reviewed/updated trx plan, Expressed Empathy/Understanding, Supported Autonomy, Collaboration, Evocation, Open-ended questions, Reflections: simple and complex and Reframe     Change Talk Expressed by the Patient: Desire to  change Ability to change Need to change Committment to change Activation Taking steps    Provider Response to Change Talk: E - Evoked more info from patient about behavior change, A - Affirmed patient's thoughts, decisions, or attempts at behavior change, R - Reflected patient's change talk and S - Summarized patient's change talk statements        Assessments completed prior to visit:  PHQ2:   PHQ-2 ( 1999 [employer]) 7/1/2022 6/17/2022 3/18/2022 12/14/2021 8/4/2016   Q1: Little interest or pleasure in doing things 1 1 1 1 0   Q2: Feeling down, depressed or hopeless 1 1 1 1 0   PHQ-2 Score 2 2 2 2 0   Q1: Little interest or pleasure in doing things Several days Several days Several days Several days -   Q2: Feeling down, depressed or hopeless Several days Several days Several days Several days -   PHQ-2 Score 2 2 2 2 -     PHQ9:   PHQ-9 SCORE 7/13/2015 12/3/2021 1/24/2022 2/21/2022   PHQ-9 Total Score 17 - - -   PHQ-9 Total Score MyChart - 6 (Mild depression) - -   PHQ-9 Total Score - 6 8 6     GAD2:   MILAD-2 12/14/2021 3/18/2022 6/17/2022 6/17/2022 7/1/2022   Feeling nervous, anxious, or on edge 1 1 1 1 1   Not being able to stop or control worrying 1 1 1 1 1   MILAD-2 Total Score 2 2 2 2 2     GAD7:   MILAD-7 SCORE 12/3/2021 1/24/2022 2/21/2022   Total Score 5 (mild anxiety) - -   Total Score 5 7 8     PROMIS 10-Global Health (only subscores and total score):   PROMIS-10 Scores Only 12/14/2021 2/21/2022 3/18/2022 6/17/2022 6/17/2022 7/1/2022   Global Mental Health Score 10 14 11 11 11 11   Global Physical Health Score 14 17 16 14 14 17   PROMIS TOTAL - SUBSCORES 24 31 27 25 25 28         ASSESSMENT: Current Emotional / Mental Status (status of significant symptoms):   Risk status (Self / Other harm or suicidal ideation)   Patient denies current fears or concerns for personal safety.   Patient denies current or recent suicidal ideation or behaviors.   Patient denies current or recent homicidal ideation or  behaviors.   Patient denies current or recent self injurious behavior or ideation.   Patient denies other safety concerns.   Patient reports there has been no change in risk factors since their last session.     Patient reports there has been no change in protective factors since their last session.     Recommended that patient call 911 or go to the local ED should there be a change in any of these risk factors.     Appearance:   Appropriate    Eye Contact:   Good    Psychomotor Behavior: Normal    Attitude:   Cooperative  Interested Friendly Pleasant   Orientation:   All   Speech    Rate / Production: Normal/ Responsive Talkative    Volume:  Normal    Mood:    Depressed  Normal   Affect:    Appropriate    Thought Content:  Clear    Thought Form:  Coherent  Logical    Insight:    Good  and Intellectual Insight     Medication Review:   No changes to current psychiatric medication(s)     Medication Compliance:   Yes     Changes in Health Issues:   None reported     Chemical Use Review:   Substance Use: Chemical use reviewed, no active concerns identified      Tobacco Use: No current tobacco use.      Diagnosis:  1. Adjustment disorder with mixed anxiety and depressed mood        Collateral Reports Completed:   Not Applicable    PLAN: (Patient Tasks / Therapist Tasks / Other)  Continue to work on seeing the trees instead of the forest   Identify two ways to prioritize yourself   Write a letter to his mom about what is important to him and why (don't send)  Review Handouts sent through [employer], try to do the socratic questions once  Reflect on how to normalize LGBT stuff  Continue to wearing the pride bracellet    Follow-up on next time:  Make a list of potential interests or hobbies you may want to try    Keaton Stein [facility]                                                       ______________________________________________________________________    Individual Treatment Plan    Patient's Name: Jamari Bernstein Cipriano  Date Of  Birth: 2000    Date of Creation: 3/4/22  Date Treatment Plan Last Reviewed/Revised: 7/15/22    DSM5 Diagnoses: Adjustment Disorders  309.28 (F43.23) With mixed anxiety and depressed mood  Psychosocial / Contextual Factors: concerns about acceptance by family, pt has started dating, financial stressors  PROMIS (reviewed every 90 days):   PROMIS 10-Global Health (only subscores and total score):   PROMIS-10 Scores Only 12/14/2021 2/21/2022 3/18/2022 6/17/2022 6/17/2022 7/1/2022   Global Mental Health Score 10 14 11 11 11 11   Global Physical Health Score 14 17 16 14 14 17   PROMIS TOTAL - SUBSCORES 24 31 27 25 25 28       Referral / Collaboration:  Referral to another professional/service is not indicated at this time..    Anticipated number of session for this episode of care: 8-13  Anticipation frequency of session: Biweekly  Anticipated Duration of each session: 38-52 minutes  Treatment plan will be reviewed in 90 days or when goals have been changed.       MeasurableTreatment Goal(s) related to diagnosis / functional impairment(s)  Goal 1: Patient will develop helpful coping skills, while learning to recognize unhelpful ones, and increase/expand his support system.     I will know I've met my goal when I am more able to feel more confident in myself and decisions/presetation.     Objective #A (Patient Action)    Patient will identify helpful vs unhelpful coping strategies to more effectively address stressors  exercise for 30 minutes 3 times per week for 30 minutes  Decrease frequency and intensity of feeling down, depressed, hopeless  Improve diet, appetite, mindful eating, and / or meal planning  Identify negative self-talk and behaviors: challenge core beliefs, myths, and actions  identify 5 coping strategies for improving mood  practice one mindfulness skill each day for 5-10 minutes.  Status: Continued - Date(s):7/15/22     Intervention(s)  Therapist will assign homework related to target objective with  "the intent to promote pt autonomy and better manage MH.  Facilitate increase in self-awareness  Provide psycho-education on mindfulness and helpful vs. unhelpful coping identification  Teach/promote utilization of coping skill development/exploration and mindfulness techniques    Objective #B Pt will learn how to develop and identify a helpful support system.  Patient will participate in social or interpersonal activities to improve mood  attend and participate in social or recreational activities to expand social network/support  Increase interest, engagement, and pleasure in doing things  Identify negative self-talk and behaviors: challenge core beliefs, myths, and actions  track and record at least 3 pleasant exchanges with friends and new people.  Status: Continued - Date(s): 7/15/22     Intervention(s)  Therapist will assign homework related to target objective with the intent to promote pt autonomy and better manage MH.  Facilitate increase in self-awareness  Provide psycho-education on social distortions/fallacies and boundaries  Teach/promote utilization of social engagement skills and problem solving    Goal 2: Patient will be more able to engage socially and feel less anxious when interacting in a group setting.     I will know I've met my goal when I am able to be less stressed/anxious in social interactions, especially in group settings/activities.      Objective #A (Patient Action)    Status: Continued - Date(s): 7/15/22     Patient will identify and compliment positives at least 3 times daily to support positive self-image  identify unhelpful or unrealistic thoughts/concerns and stressors which contribute to feelings of anxiety  learn and demonstrate pro-social and communication assertiveness skill(s)  initiate 3 social contact(s) per day for increasing lengths of time  practice setting boundaries 3 times in the next several weeks  accept compliment with a \"thank you\" and no self deprecating " comments.    Intervention(s)  Therapist will assign homework related to target objective with the intent to promote pt autonomy and better manage MH.  Facilitate increase in self-awareness  Provide psycho-education on social anxiety thinking and communication styles  Teach/promote utilization of thought challenging and self-confidence building    Goal 3: Patient will work on processing through self-identity and how to express his authentic or genuine self.     I will know I've met my goal when I feel more comfortable being my authentic or genuine self.      Objective #A (Patient Action)  Status: Continued - Date(s): 7/15/22     Patient will identify communication strategies to more effectively address stressors  use thought-stopping strategy daily to reduce intrusive thoughts  Identify negative self-talk and behaviors: challenge core beliefs, myths, and actions  identify how the use of substances contributes to the avoidance of feeling associated with the loss  identify the time in your life when you began to feel poorly about themselves.  Use positive self-affirmation daily to promote self-acceptance.    Intervention(s)  Therapist will assign homework related to target objective with the intent to promote pt autonomy and better manage MH.  Facilitate increase in self-awareness  Provide psycho-education on self-compassion and anticipatory grief and avoidance  Teach/promote utilization of self-care/acceptance and processing through internal-experiences    Patient has reviewed and agreed to the above plan.      Keaton Stein  [facility]  March 4, 2022

## 2022-07-22 ENCOUNTER — VIRTUAL VISIT (OUTPATIENT)
Dept: BEHAVIORAL HEALTH | Facility: HOSPITAL | Age: 22
End: 2022-07-22
Attending: PSYCHIATRY & NEUROLOGY
Payer: COMMERCIAL

## 2022-07-22 DIAGNOSIS — F43.23 ADJUSTMENT DISORDER WITH MIXED ANXIETY AND DEPRESSED MOOD: Primary | ICD-10-CM

## 2022-07-22 PROCEDURE — 90837 PSYTX W PT 60 MINUTES: CPT | Mod: 95 | Performed by: COUNSELOR

## 2022-07-22 NOTE — PROGRESS NOTES
[facility]                                      Progress Note    Patient Name: Jamari Cota  Date: 7/22/22         Service Type: Individual      Session Start Time: 1:00 pm  Session End Time: 1:58 pm     Session Length: 58 minutes    Session #: 13    Attendees: Client attended alone    Service Modality:  Video Visit:      Provider verified identity through the following two step process.  Patient provided:  Patient is known previously to provider    Telemedicine Visit: The patient's condition can be safely assessed and treated via synchronous audio and visual telemedicine encounter.      Reason for Telemedicine Visit: Services only offered telehealth    Originating Site (Patient Location): Patient's other car (not moving)    Distant Site (Provider Location): Provider Remote Setting- Home Office    Consent:  The patient/guardian has verbally consented to: the potential risks and benefits of telemedicine (video visit) versus in person care; bill my insurance or make self-payment for services provided; and responsibility for payment of non-covered services.     Patient would like the video invitation sent by:  My Chart    Mode of Communication:  Video Conference via [employer]    As the provider I attest to compliance with applicable laws and regulations related to telemedicine.    DATA  Interactive Complexity: No  Crisis: No  Extended Session (53+ minutes): PROLONGED SERVICE IN THE OUTPATIENT SETTING REQUIRING DIRECT (FACE-TO-FACE) PATIENT CONTACT BEYOND THE USUAL SERVICE:    - Patient's presenting concerns require more intensive intervention than could be completed within the usual service      Progress Since Last Session (Related to Symptoms / Goals / Homework):   Symptoms: No change in symptoms, pt reports things being the same as last session    Homework: Achieved / completed to satisfaction      Episode of Care Goals: Satisfactory progress - ACTION (Actively working towards change);  Intervened by reinforcing change plan / affirming steps taken     Current / Ongoing Stressors and Concerns:   Pt is currently seeking therapy related to depression and anxiety around self-exploration related to his sexual identity (bi-sexual, possibly homosexual), and has some lingering shame around his sexual identity, which he is still trying to figure out. Pt grew up in a very conservative Gnosticist home, where same-sex relations were not supported, which has caused him to have an internal struggle between perceived expectations and his desire to be authentic. Since last session, pt reported that this week has been much like last week, where he has been keeping busy. He has continued to do his courses for his new position, which has been going much the same but he is feeling more confident in his knowledge that he doesn't need to know everything. Pt expressed having some physical symptoms of anxiety around meeting new people, citing his social anxiety as a source of this. Pt processed through frustration that he has been experiencing around his mom, including how her decision making can impact others, not just her. Pt reflected on how he came to learn how this impacted him while growing up, including having limited access to resources (ie. Food) because she refused help from his grandma financially then like she is doing now when pt offered it recently. Pt also talked about how he has been parentified since he was about 6 years old, and had to take care of his mom and eventually his sister, due to her drinking. Pt processed how his relationship with his mom has been historically, and the difficulties that he has had related to her.   Pt briefly talked about his physical sensations that he has been experiencing when in social situations, likening them to panic-like symptoms that he has had in the past. Therapist (writer) reviewed interoceptives as an trx options and discussed social anxiety symptoms. Session went  longer then anticipated due to the complex topic matter related to potential trauma-like experiences in the past.     Topic for the future: second round of dating with ex-girlfriend; normalizing LGBT through media exposure; Cognitive distortions. wanting to not use delta 8 to manage anxiety (helpful vs. Unhelpful coping skills)    Current coping skills-  Music  Breathing technique  D8 pen (vape) - unhelpful  Eating - unhelpful  Socializing  Reading to escape (games books dnd) - unhelpful in excess   Exercising  Singing (car singing)  Staying busy at work a lot with low-priority or off tasks - unhelpful    Look up LGBT people who are surprising to pt, and bring 3 examples - openly singh [employer] member, music artists, j[employer] youtube channel: middle ground   Make list of 5 potential jobs or job fields you may want to go into - doesn't feel he has the confidence to apply for some jobs due to internalizing some feelings of past failure.      Treatment Objective(s) Addressed in This Session:   identify current thoughts, triggers, and stressors which contribute to feelings of depression  identify unhelpful or unrealistic fears / thoughts that contribute to feeling anxious  Increase interest, engagement, and pleasure in doing things  Decrease frequency and intensity of feeling down, depressed, hopeless  Feel less tired and more energy during the day   Identify negative self-talk and behaviors: challenge core beliefs, myths, and actions  identify coping strategies for improving mood  identify two areas of life that you would like to have improved functioning     Intervention:   CBT: Discussed the impact of his thought process on his mood and how his work environment has been more challenging for his MH then he realized.   Psychodynamic: Processed internal-experiences related to being more LGBT visible   Solution Focused: Identified areas in which pt is self-imposing barriers and possible strategies to overcome  them.    Motivational Interviewing    MI Intervention: Expressed Empathy/Understanding, Supported Autonomy, Collaboration, Evocation, Open-ended questions and Reflections: simple and complex     Change Talk Expressed by the Patient: Desire to change Ability to change Reasons to change Committment to change Activation Taking steps    Provider Response to Change Talk: E - Evoked more info from patient about behavior change, A - Affirmed patient's thoughts, decisions, or attempts at behavior change, R - Reflected patient's change talk and S - Summarized patient's change talk statements        Assessments completed prior to visit:  PHQ2:   PHQ-2 ( 1999 [employer]) 7/15/2022 7/1/2022 6/17/2022 3/18/2022 12/14/2021 8/4/2016   Q1: Little interest or pleasure in doing things 1 1 1 1 1 0   Q2: Feeling down, depressed or hopeless 1 1 1 1 1 0   PHQ-2 Score 2 2 2 2 2 0   Q1: Little interest or pleasure in doing things Several days Several days Several days Several days Several days -   Q2: Feeling down, depressed or hopeless Several days Several days Several days Several days Several days -   PHQ-2 Score 2 2 2 2 2 -     PHQ9:   PHQ-9 SCORE 7/13/2015 12/3/2021 1/24/2022 2/21/2022   PHQ-9 Total Score 17 - - -   PHQ-9 Total Score MyChart - 6 (Mild depression) - -   PHQ-9 Total Score - 6 8 6     GAD2:   MILAD-2 12/14/2021 3/18/2022 6/17/2022 6/17/2022 7/1/2022 7/15/2022 7/15/2022   Feeling nervous, anxious, or on edge 1 1 1 1 1 1 1   Not being able to stop or control worrying 1 1 1 1 1 1 1   MILAD-2 Total Score 2 2 2 2 2 2 2     GAD7:   MILAD-7 SCORE 12/3/2021 1/24/2022 2/21/2022   Total Score 5 (mild anxiety) - -   Total Score 5 7 8     PROMIS 10-Global Health (only subscores and total score):   PROMIS-10 Scores Only 2/21/2022 3/18/2022 6/17/2022 6/17/2022 7/1/2022 7/15/2022 7/15/2022   Global Mental Health Score 14 11 11 11 11 11 11   Global Physical Health Score 17 16 14 14 17 17 17   PROMIS TOTAL - SUBSCORES 31 27 25 25 28 28 28          ASSESSMENT: Current Emotional / Mental Status (status of significant symptoms):   Risk status (Self / Other harm or suicidal ideation)   Patient denies current fears or concerns for personal safety.   Patient denies current or recent suicidal ideation or behaviors.   Patient denies current or recent homicidal ideation or behaviors.   Patient denies current or recent self injurious behavior or ideation.   Patient denies other safety concerns.   Patient reports there has been no change in risk factors since their last session.     Patient reports there has been no change in protective factors since their last session.     Recommended that patient call 911 or go to the local ED should there be a change in any of these risk factors.     Appearance:   Appropriate    Eye Contact:   Good    Psychomotor Behavior: Normal    Attitude:   Cooperative  Interested Friendly Pleasant   Orientation:   All   Speech    Rate / Production: Normal/ Responsive Talkative    Volume:  Normal    Mood:    Depressed  Normal   Affect:    Appropriate    Thought Content:  Clear    Thought Form:  Coherent  Logical    Insight:    Good  and Intellectual Insight     Medication Review:   No changes to current psychiatric medication(s)     Medication Compliance:   Yes     Changes in Health Issues:   None reported     Chemical Use Review:   Substance Use: Chemical use reviewed, no active concerns identified      Tobacco Use: No current tobacco use.      Diagnosis:  1. Adjustment disorder with mixed anxiety and depressed mood        Collateral Reports Completed:   Not Applicable    PLAN: (Patient Tasks / Therapist Tasks / Other)  Continue to work on seeing the trees instead of the forest   Identify two ways to prioritize yourself   Continue to wearing the pride bracellet    Complete Assessment Sent through [employer]  Review and complete initial interoceptives packet send through [employer]    Follow-up on next time:  Make a list of potential interests  or hobbies you may want to try  Reflect on how to normalize LGBT stuff      Keaton Stein [facility]                                                       ______________________________________________________________________    Individual Treatment Plan    Patient's Name: Jamari Cota  YOB: 2000    Date of Creation: 3/4/22  Date Treatment Plan Last Reviewed/Revised: 7/15/22    DSM5 Diagnoses: Adjustment Disorders  309.28 (F43.23) With mixed anxiety and depressed mood  Psychosocial / Contextual Factors: concerns about acceptance by family, pt has started dating, financial stressors  PROMIS (reviewed every 90 days):   PROMIS 10-Global Health (only subscores and total score):   PROMIS-10 Scores Only 2/21/2022 3/18/2022 6/17/2022 6/17/2022 7/1/2022 7/15/2022 7/15/2022   Global Mental Health Score 14 11 11 11 11 11 11   Global Physical Health Score 17 16 14 14 17 17 17   PROMIS TOTAL - SUBSCORES 31 27 25 25 28 28 28       Referral / Collaboration:  Referral to another professional/service is not indicated at this time..    Anticipated number of session for this episode of care: 8-13  Anticipation frequency of session: Biweekly  Anticipated Duration of each session: 38-52 minutes  Treatment plan will be reviewed in 90 days or when goals have been changed.       MeasurableTreatment Goal(s) related to diagnosis / functional impairment(s)  Goal 1: Patient will develop helpful coping skills, while learning to recognize unhelpful ones, and increase/expand his support system.     I will know I've met my goal when I am more able to feel more confident in myself and decisions/presetation.     Objective #A (Patient Action)    Patient will identify helpful vs unhelpful coping strategies to more effectively address stressors  exercise for 30 minutes 3 times per week for 30 minutes  Decrease frequency and intensity of feeling down, depressed, hopeless  Improve diet, appetite, mindful eating, and / or meal  planning  Identify negative self-talk and behaviors: challenge core beliefs, myths, and actions  identify 5 coping strategies for improving mood  practice one mindfulness skill each day for 5-10 minutes.  Status: Continued - Date(s):7/15/22     Intervention(s)  Therapist will assign homework related to target objective with the intent to promote pt autonomy and better manage MH.  Facilitate increase in self-awareness  Provide psycho-education on mindfulness and helpful vs. unhelpful coping identification  Teach/promote utilization of coping skill development/exploration and mindfulness techniques    Objective #B Pt will learn how to develop and identify a helpful support system.  Patient will participate in social or interpersonal activities to improve mood  attend and participate in social or recreational activities to expand social network/support  Increase interest, engagement, and pleasure in doing things  Identify negative self-talk and behaviors: challenge core beliefs, myths, and actions  track and record at least 3 pleasant exchanges with friends and new people.  Status: Continued - Date(s): 7/15/22     Intervention(s)  Therapist will assign homework related to target objective with the intent to promote pt autonomy and better manage MH.  Facilitate increase in self-awareness  Provide psycho-education on social distortions/fallacies and boundaries  Teach/promote utilization of social engagement skills and problem solving    Goal 2: Patient will be more able to engage socially and feel less anxious when interacting in a group setting.     I will know I've met my goal when I am able to be less stressed/anxious in social interactions, especially in group settings/activities.      Objective #A (Patient Action)    Status: Continued - Date(s): 7/15/22     Patient will identify and compliment positives at least 3 times daily to support positive self-image  identify unhelpful or unrealistic thoughts/concerns and  "stressors which contribute to feelings of anxiety  learn and demonstrate pro-social and communication assertiveness skill(s)  initiate 3 social contact(s) per day for increasing lengths of time  practice setting boundaries 3 times in the next several weeks  accept compliment with a \"thank you\" and no self deprecating comments.    Intervention(s)  Therapist will assign homework related to target objective with the intent to promote pt autonomy and better manage MH.  Facilitate increase in self-awareness  Provide psycho-education on social anxiety thinking and communication styles  Teach/promote utilization of thought challenging and self-confidence building    Goal 3: Patient will work on processing through self-identity and how to express his authentic or genuine self.     I will know I've met my goal when I feel more comfortable being my authentic or genuine self.      Objective #A (Patient Action)  Status: Continued - Date(s): 7/15/22     Patient will identify communication strategies to more effectively address stressors  use thought-stopping strategy daily to reduce intrusive thoughts  Identify negative self-talk and behaviors: challenge core beliefs, myths, and actions  identify how the use of substances contributes to the avoidance of feeling associated with the loss  identify the time in your life when you began to feel poorly about themselves.  Use positive self-affirmation daily to promote self-acceptance.    Intervention(s)  Therapist will assign homework related to target objective with the intent to promote pt autonomy and better manage MH.  Facilitate increase in self-awareness  Provide psycho-education on self-compassion and anticipatory grief and avoidance  Teach/promote utilization of self-care/acceptance and processing through internal-experiences    Patient has reviewed and agreed to the above plan.      Keaton Stein  [facility]  March 4, 2022  "

## 2022-07-25 DIAGNOSIS — K75.81 NASH (NONALCOHOLIC STEATOHEPATITIS): Primary | ICD-10-CM

## 2022-08-12 ENCOUNTER — VIRTUAL VISIT (OUTPATIENT)
Dept: BEHAVIORAL HEALTH | Facility: HOSPITAL | Age: 22
End: 2022-08-12
Attending: PSYCHIATRY & NEUROLOGY
Payer: COMMERCIAL

## 2022-08-12 DIAGNOSIS — F43.23 ADJUSTMENT DISORDER WITH MIXED ANXIETY AND DEPRESSED MOOD: Primary | ICD-10-CM

## 2022-08-12 PROCEDURE — 90837 PSYTX W PT 60 MINUTES: CPT | Mod: 95 | Performed by: COUNSELOR

## 2022-08-12 NOTE — PROGRESS NOTES
[facility]                                      Progress Note    Patient Name: Jamari Cota  Date: 8/12/22         Service Type: Individual      Session Start Time: 1:00 pm  Session End Time: 1:58 pm     Session Length: 58 minutes    Session #: 14    Attendees: Client attended alone    Service Modality:  Video Visit:      Provider verified identity through the following two step process.  Patient provided:  Patient is known previously to provider    Telemedicine Visit: The patient's condition can be safely assessed and treated via synchronous audio and visual telemedicine encounter.      Reason for Telemedicine Visit: Services only offered telehealth    Originating Site (Patient Location): Patient's home     Distant Site (Provider Location): Provider Remote Setting- Home Office    Consent:  The patient/guardian has verbally consented to: the potential risks and benefits of telemedicine (video visit) versus in person care; bill my insurance or make self-payment for services provided; and responsibility for payment of non-covered services.     Patient would like the video invitation sent by:  My Chart    Mode of Communication:  Video Conference via Amwell    As the provider I attest to compliance with applicable laws and regulations related to telemedicine.    DATA  Interactive Complexity: No  Crisis: No  Extended Session (53+ minutes): PROLONGED SERVICE IN THE OUTPATIENT SETTING REQUIRING DIRECT (FACE-TO-FACE) PATIENT CONTACT BEYOND THE USUAL SERVICE:    - Longer session due to limited access to mental health appointments and necessity to address patient's distress / complexity    - Patient's presenting concerns require more intensive intervention than could be completed within the usual service      Progress Since Last Session (Related to Symptoms / Goals / Homework):   Symptoms: Improving symptoms overall, less focus on the negative    Homework: Partially completed      Episode of Care  Goals: Satisfactory progress - PREPARATION (Decided to change - considering how); Intervened by negotiating a change plan and determining options / strategies for behavior change, identifying triggers, exploring social supports, and working towards setting a date to begin behavior change     Current / Ongoing Stressors and Concerns:   Pt is currently seeking therapy related to depression and anxiety around self-exploration related to his sexual identity (bi-sexual, possibly homosexual), and has some lingering shame around his sexual identity, which he is still trying to figure out. Pt grew up in a very conservative [employer] home, where same-sex relations were not supported, which has caused him to have an internal struggle between perceived expectations and his desire to be authentic. Since last session, pt reported that no big dramatic things have happened and he started his new position, which has been going well. He has been taking less calls, which he thinks has been a big help, and he has been focusing on taking course/classes which has been filling up his time as well, which has also been helpful. Pt did have three days where he felt down, which he processed through, and identified the tools he was able to use to help him get through this. Pt identified that this was triggered by his ex-gf reaching out to him and causing him to feel alone. Upon reflection, pt recognized that she was likely his first real support system in his life, causing them to be somewhat co-dependent, and that has made it hard for him to process through it. Pt expressed that he hates being alone, and that at least part of his reaction to her texting him bring this up for him. Pt processed through how his head and heart don't always line up and discussed how his experiences around emotions can be challenging/conflicting. Pt thinks that thinking about it more, would be helpful, but will need to think about this more. Pt also discussed how  he has some trust issues and difficulty with being open to on leaning on supports due to the lingering shadow of LGBT concerns/fears. Due to topic matter and pt's recent limited access to sessions, session went longer then anticipated.     Topic for the future: second round of dating with ex-girlfriend; normalizing LGBT through media exposure; Cognitive distortions. wanting to not use delta 8 to manage anxiety (helpful vs. Unhelpful coping skills)    Current coping skills-  Music  Breathing technique  D8 pen (vape) - unhelpful  Eating - unhelpful  Socializing  Reading to escape (games books dnd) - unhelpful in excess   Exercising  Singing (car singing)  Staying busy at work a lot with low-priority or off tasks - unhelpful    Look up LGBT people who are surprising to pt, and bring 3 examples - openly singh [employer] member, music artists, j[employer] youtube channel: middle ground   Make list of 5 potential jobs or job fields you may want to go into - doesn't feel he has the confidence to apply for some jobs due to internalizing some feelings of past failure.      Treatment Objective(s) Addressed in This Session:   identify current thoughts, triggers, and stressors which contribute to feelings of depression  identify unhelpful or unrealistic fears / thoughts that contribute to feeling anxious  Increase interest, engagement, and pleasure in doing things  Decrease frequency and intensity of feeling down, depressed, hopeless  Feel less tired and more energy during the day   Identify negative self-talk and behaviors: challenge core beliefs, myths, and actions  identify coping strategies for improving mood  identify two areas of life that you would like to have improved functioning     Intervention:   CBT: Discussed the internal conflict/disconnect between his heart and his brain (thoughts and feelings).   Psychodynamic: Processed internal-experiences related to being hurt in past relationships.   Solution Focused: Identified areas  in which pt is self-imposing barriers and possible strategies to overcome them.    Motivational Interviewing    MI Intervention: Expressed Empathy/Understanding, Supported Autonomy, Collaboration, Evocation, Open-ended questions, Reflections: simple and complex and Reframe     Change Talk Expressed by the Patient: Desire to change Ability to change Reasons to change Committment to change Activation Taking steps    Provider Response to Change Talk: E - Evoked more info from patient about behavior change, A - Affirmed patient's thoughts, decisions, or attempts at behavior change, R - Reflected patient's change talk and S - Summarized patient's change talk statements        Assessments completed prior to visit:  PHQ2:   PHQ-2 ( 1999 Pf[employer]) 7/22/2022 7/15/2022 7/1/2022 6/17/2022 3/18/2022 12/14/2021 8/4/2016   Q1: Little interest or pleasure in doing things 1 1 1 1 1 1 0   Q2: Feeling down, depressed or hopeless 1 1 1 1 1 1 0   PHQ-2 Score 2 2 2 2 2 2 0   Q1: Little interest or pleasure in doing things Several days Several days Several days Several days Several days Several days -   Q2: Feeling down, depressed or hopeless Several days Several days Several days Several days Several days Several days -   PHQ-2 Score 2 2 2 2 2 2 -     PHQ9:   PHQ-9 SCORE 7/13/2015 12/3/2021 1/24/2022 2/21/2022   PHQ-9 Total Score 17 - - -   PHQ-9 Total Score MyChart - 6 (Mild depression) - -   PHQ-9 Total Score - 6 8 6     GAD2:   MILAD-2 12/14/2021 3/18/2022 6/17/2022 6/17/2022 7/1/2022 7/15/2022 7/15/2022   Feeling nervous, anxious, or on edge 1 1 1 1 1 1 1   Not being able to stop or control worrying 1 1 1 1 1 1 1   MILAD-2 Total Score 2 2 2 2 2 2 2     GAD7:   MILAD-7 SCORE 12/3/2021 1/24/2022 2/21/2022   Total Score 5 (mild anxiety) - -   Total Score 5 7 8     PROMIS 10-Global Health (only subscores and total score):   PROMIS-10 Scores Only 2/21/2022 3/18/2022 6/17/2022 6/17/2022 7/1/2022 7/15/2022 7/15/2022   Global Mental Health Score 14  11 11 11 11 11 11   Global Physical Health Score 17 16 14 14 17 17 17   PROMIS TOTAL - SUBSCORES 31 27 25 25 28 28 28         ASSESSMENT: Current Emotional / Mental Status (status of significant symptoms):   Risk status (Self / Other harm or suicidal ideation)   Patient denies current fears or concerns for personal safety.   Patient denies current or recent suicidal ideation or behaviors.   Patient denies current or recent homicidal ideation or behaviors.   Patient denies current or recent self injurious behavior or ideation.   Patient denies other safety concerns.   Patient reports there has been no change in risk factors since their last session.     Patient reports there has been no change in protective factors since their last session.     Recommended that patient call 911 or go to the local ED should there be a change in any of these risk factors.     Appearance:   Appropriate    Eye Contact:   Good    Psychomotor Behavior: Normal    Attitude:   Cooperative  Interested Friendly Pleasant   Orientation:   All   Speech    Rate / Production: Normal/ Responsive Talkative    Volume:  Normal    Mood:    Depressed  Normal   Affect:    Appropriate    Thought Content:  Clear    Thought Form:  Coherent  Logical    Insight:    Fair  and Intellectual Insight     Medication Review:   No changes to current psychiatric medication(s)     Medication Compliance:   Yes     Changes in Health Issues:   None reported     Chemical Use Review:   Substance Use: Chemical use reviewed, no active concerns identified      Tobacco Use: No current tobacco use.      Diagnosis:  1. Adjustment disorder with mixed anxiety and depressed mood        Collateral Reports Completed:   Not Applicable    PLAN: (Patient Tasks / Therapist Tasks / Other)  Continue to work on seeing the trees instead of the forest   Identify two ways to prioritize yourself   Continue to wearing the pride bracellet    Complete Assessment Sent through [employer]  Review and  complete initial interoceptives packet send through [email or web address]    Follow-up on next time:  Make a list of potential interests or hobbies you may want to try  Reflect on how to normalize LGBT stuff      Keaton Stein [facility]                                                       ______________________________________________________________________    Individual Treatment Plan    Patient's Name: Jamari Cota  YOB: 2000    Date of Creation: 3/4/22  Date Treatment Plan Last Reviewed/Revised: 7/15/22    DSM5 Diagnoses: Adjustment Disorders  309.28 (F43.23) With mixed anxiety and depressed mood  Psychosocial / Contextual Factors: concerns about acceptance by family, pt has started dating, financial stressors  PROMIS (reviewed every 90 days):   PROMIS 10-Global Health (only subscores and total score):   PROMIS-10 Scores Only 2/21/2022 3/18/2022 6/17/2022 6/17/2022 7/1/2022 7/15/2022 7/15/2022   Global Mental Health Score 14 11 11 11 11 11 11   Global Physical Health Score 17 16 14 14 17 17 17   PROMIS TOTAL - SUBSCORES 31 27 25 25 28 28 28       Referral / Collaboration:  Referral to another professional/service is not indicated at this time..    Anticipated number of session for this episode of care: 8-13  Anticipation frequency of session: Biweekly  Anticipated Duration of each session: 38-52 minutes  Treatment plan will be reviewed in 90 days or when goals have been changed.       MeasurableTreatment Goal(s) related to diagnosis / functional impairment(s)  Goal 1: Patient will develop helpful coping skills, while learning to recognize unhelpful ones, and increase/expand his support system.     I will know I've met my goal when I am more able to feel more confident in myself and decisions/presetation.     Objective #A (Patient Action)    Patient will identify helpful vs unhelpful coping strategies to more effectively address stressors  exercise for 30 minutes 3 times per week for 30 minutes  Decrease  frequency and intensity of feeling down, depressed, hopeless  Improve diet, appetite, mindful eating, and / or meal planning  Identify negative self-talk and behaviors: challenge core beliefs, myths, and actions  identify 5 coping strategies for improving mood  practice one mindfulness skill each day for 5-10 minutes.  Status: Continued - Date(s):7/15/22     Intervention(s)  Therapist will assign homework related to target objective with the intent to promote pt autonomy and better manage MH.  Facilitate increase in self-awareness  Provide psycho-education on mindfulness and helpful vs. unhelpful coping identification  Teach/promote utilization of coping skill development/exploration and mindfulness techniques    Objective #B Pt will learn how to develop and identify a helpful support system.  Patient will participate in social or interpersonal activities to improve mood  attend and participate in social or recreational activities to expand social network/support  Increase interest, engagement, and pleasure in doing things  Identify negative self-talk and behaviors: challenge core beliefs, myths, and actions  track and record at least 3 pleasant exchanges with friends and new people.  Status: Continued - Date(s): 7/15/22     Intervention(s)  Therapist will assign homework related to target objective with the intent to promote pt autonomy and better manage MH.  Facilitate increase in self-awareness  Provide psycho-education on social distortions/fallacies and boundaries  Teach/promote utilization of social engagement skills and problem solving    Goal 2: Patient will be more able to engage socially and feel less anxious when interacting in a group setting.     I will know I've met my goal when I am able to be less stressed/anxious in social interactions, especially in group settings/activities.      Objective #A (Patient Action)    Status: Continued - Date(s): 7/15/22     Patient will identify and compliment  "positives at least 3 times daily to support positive self-image  identify unhelpful or unrealistic thoughts/concerns and stressors which contribute to feelings of anxiety  learn and demonstrate pro-social and communication assertiveness skill(s)  initiate 3 social contact(s) per day for increasing lengths of time  practice setting boundaries 3 times in the next several weeks  accept compliment with a \"thank you\" and no self deprecating comments.    Intervention(s)  Therapist will assign homework related to target objective with the intent to promote pt autonomy and better manage MH.  Facilitate increase in self-awareness  Provide psycho-education on social anxiety thinking and communication styles  Teach/promote utilization of thought challenging and self-confidence building    Goal 3: Patient will work on processing through self-identity and how to express his authentic or genuine self.     I will know I've met my goal when I feel more comfortable being my authentic or genuine self.      Objective #A (Patient Action)  Status: Continued - Date(s): 7/15/22     Patient will identify communication strategies to more effectively address stressors  use thought-stopping strategy daily to reduce intrusive thoughts  Identify negative self-talk and behaviors: challenge core beliefs, myths, and actions  identify how the use of substances contributes to the avoidance of feeling associated with the loss  identify the time in your life when you began to feel poorly about themselves.  Use positive self-affirmation daily to promote self-acceptance.    Intervention(s)  Therapist will assign homework related to target objective with the intent to promote pt autonomy and better manage MH.  Facilitate increase in self-awareness  Provide psycho-education on self-compassion and anticipatory grief and avoidance  Teach/promote utilization of self-care/acceptance and processing through internal-experiences    Patient has reviewed and " agreed to the above plan.      Keaton Stein  [facility]  March 4, 2022

## 2022-08-15 ENCOUNTER — LAB (OUTPATIENT)
Dept: LAB | Facility: CLINIC | Age: 22
End: 2022-08-15

## 2022-08-15 ENCOUNTER — OFFICE VISIT (OUTPATIENT)
Dept: GASTROENTEROLOGY | Facility: CLINIC | Age: 22
End: 2022-08-15
Payer: COMMERCIAL

## 2022-08-15 VITALS
BODY MASS INDEX: 30.34 KG/M2 | OXYGEN SATURATION: 100 % | DIASTOLIC BLOOD PRESSURE: 83 MMHG | HEIGHT: 72 IN | HEART RATE: 69 BPM | SYSTOLIC BLOOD PRESSURE: 120 MMHG | WEIGHT: 224 LBS

## 2022-08-15 DIAGNOSIS — K75.81 NASH (NONALCOHOLIC STEATOHEPATITIS): ICD-10-CM

## 2022-08-15 DIAGNOSIS — K75.81 NASH (NONALCOHOLIC STEATOHEPATITIS): Primary | ICD-10-CM

## 2022-08-15 LAB
ALBUMIN SERPL-MCNC: 4.3 G/DL (ref 3.4–5)
ALP SERPL-CCNC: 60 U/L (ref 40–150)
ALT SERPL W P-5'-P-CCNC: 27 U/L (ref 0–70)
ANION GAP SERPL CALCULATED.3IONS-SCNC: 8 MMOL/L (ref 3–14)
AST SERPL W P-5'-P-CCNC: 18 U/L (ref 0–45)
BILIRUB SERPL-MCNC: 1.8 MG/DL (ref 0.2–1.3)
BUN SERPL-MCNC: 13 MG/DL (ref 7–30)
CALCIUM SERPL-MCNC: 9.6 MG/DL (ref 8.5–10.1)
CHLORIDE BLD-SCNC: 106 MMOL/L (ref 94–109)
CO2 SERPL-SCNC: 24 MMOL/L (ref 20–32)
CREAT SERPL-MCNC: 0.91 MG/DL (ref 0.66–1.25)
ERYTHROCYTE [DISTWIDTH] IN BLOOD BY AUTOMATED COUNT: 12.7 % (ref 10–15)
GFR SERPL CREATININE-BSD FRML MDRD: >90 ML/MIN/1.73M2
GLUCOSE BLD-MCNC: 101 MG/DL (ref 70–99)
HCT VFR BLD AUTO: 48.7 % (ref 40–53)
HGB BLD-MCNC: 15.9 G/DL (ref 13.3–17.7)
INR PPP: 1.06 (ref 0.85–1.15)
MCH RBC QN AUTO: 28.2 PG (ref 26.5–33)
MCHC RBC AUTO-ENTMCNC: 32.6 G/DL (ref 31.5–36.5)
MCV RBC AUTO: 87 FL (ref 78–100)
PLATELET # BLD AUTO: 253 10E3/UL (ref 150–450)
POTASSIUM BLD-SCNC: 3.9 MMOL/L (ref 3.4–5.3)
PROT SERPL-MCNC: 7.9 G/DL (ref 6.8–8.8)
RBC # BLD AUTO: 5.63 10E6/UL (ref 4.4–5.9)
SODIUM SERPL-SCNC: 138 MMOL/L (ref 133–144)
WBC # BLD AUTO: 8 10E3/UL (ref 4–11)

## 2022-08-15 PROCEDURE — 80053 COMPREHEN METABOLIC PANEL: CPT

## 2022-08-15 PROCEDURE — 85027 COMPLETE CBC AUTOMATED: CPT

## 2022-08-15 PROCEDURE — 85610 PROTHROMBIN TIME: CPT

## 2022-08-15 PROCEDURE — 36415 COLL VENOUS BLD VENIPUNCTURE: CPT

## 2022-08-15 PROCEDURE — 99213 OFFICE O/P EST LOW 20 MIN: CPT | Performed by: INTERNAL MEDICINE

## 2022-08-15 ASSESSMENT — PAIN SCALES - GENERAL: PAINLEVEL: NO PAIN (0)

## 2022-08-15 NOTE — PROGRESS NOTES
[facility] Hepatology    Follow-up Visit    Follow-up visit for HOANG    Referring provider:               Twyla Sterling    Subjective:  22 year old male with fatty infiltration of the liver seen on imaging.  He initially came to us for abnormal liver function tests.  He did have risk factors like hyperlipidemia and obesity and now he is on Crestor and he lost over 50 pounds.    Patient denies jaundice, lower extremity edema, abdominal distension, lethargy or confusion. Patient denies melena, hematemesis or hematochezia.  He did not have any abdominal pain, nausea or vomiting.  He had regular bowel movement is like twice a day.  And he had done his vaccination for COVID with Pfizer and has done so far 3 doses. Patient denies fevers, sweats or chills.  Current    Medical hx Surgical hx   Past Medical History:   Diagnosis Date     Fatty liver      Otitis media with effusion     chronic     Varicella     about age 3      Past Surgical History:   Procedure Laterality Date     PE TUBES       SURGICAL HISTORY OF -       arm surgery          Medications  Prior to Admission medications    Medication Sig Start Date End Date Taking? Authorizing Provider   hydrOXYzine (ATARAX) 25 MG tablet Take 1 tablet (25 mg) by mouth 3 times daily as needed for itching 6/21/21  Yes Toby Beaulieu MD   rosuvastatin (CRESTOR) 20 MG tablet Take 1 tablet (20 mg) by mouth daily 10/6/21  Yes Twyla Sterling APRN CNP       Allergies  Allergies   Allergen Reactions     Codeine      Hyper       Review of systems  A 10-point review of systems was negative    Examination  /83 (BP Location: Right arm, Patient Position: Sitting, Cuff Size: Adult Large)   Pulse 69   Ht 1.829 m (6')   Wt 101.6 kg (224 lb)   SpO2 100%   BMI 30.38 kg/m    Body mass index is 30.38 kg/m .    Gen- well, NAD, A+Ox3, normal color  Lym- no palpable LAD  CVS- RRR  RS- CTA  Abd-mildly obese nontender.  Extr- hands normal, no MATTHEW  Skin- no rash  or jaundice  Neuro- no asterixis  Psych- normal mood    Laboratory  Lab Results   Component Value Date     02/14/2022     02/13/2008    POTASSIUM 4.1 02/14/2022    POTASSIUM 3.6 02/13/2008    CHLORIDE 109 02/14/2022    CHLORIDE 101 02/13/2008    CO2 26 02/14/2022    CO2 28 02/13/2008    BUN 11 02/14/2022    BUN 7 02/13/2008    CR 0.92 02/14/2022    CR 0.50 02/13/2008       Lab Results   Component Value Date    BILITOTAL 1.6 02/14/2022    ALT 49 02/14/2022    AST 20 02/14/2022    ALKPHOS 57 02/14/2022       Lab Results   Component Value Date    ALBUMIN 4.1 02/14/2022    PROTTOTAL 7.7 02/14/2022        Lab Results   Component Value Date    WBC 8.0 08/15/2022    WBC 15.6 02/13/2008    HGB 15.9 08/15/2022    HGB 12.3 02/13/2008    MCV 87 08/15/2022    MCV 81 02/13/2008     08/15/2022     02/13/2008       Lab Results   Component Value Date    INR 0.99 02/14/2022       Radiology    Assessment  22 year old male with multiple risk factors for HOANG.  He came to us for abnormal liver function test.  He had since our initial visit Crestor for his hyperlipidemia and he lost over 50 pounds.  In fact his liver function tests requested normalized.  Please note that the patient has stopped drinking any alcoholic beverages for the last 1 year.    Plan    We repeated his labs today and they are still pending.  He will be seen here also in 6 months.  Otherwise he will follow with his primary care physician who is going to monitor his labs.    I spent 30 minutes on this encounter, of August 15, 2022 in chart reviewing, history taking, physical examination and documentation.  Some of the time was also spent in coordination of care and counseling.    Carlota Heard MD  Hepatology  [facility]

## 2022-08-15 NOTE — NURSING NOTE
Chief Complaint   Patient presents with     Follow Up     HOANG (nonalcoholic steatohepatitis)       Vitals:    08/15/22 0749   BP: 120/83   BP Location: Right arm   Patient Position: Sitting   Cuff Size: Adult Large   Pulse: 69   SpO2: 100%   Weight: 101.6 kg (224 lb)   Height: 1.829 m (6')       Body mass index is 30.38 kg/m .    Misti Mars, [facility]

## 2022-08-15 NOTE — LETTER
8/15/2022         RE: Jamari Cota  [address]  [facility] [address]        Dear Colleague,    Thank you for referring your patient, Jamari Cota, to the [facility] [place]. Please see a copy of my visit note below.    [facility] Hepatology    Follow-up Visit    Follow-up visit for HOANG    Referring provider:               Twyla Sterling    Subjective:  22 year old male with fatty infiltration of the liver seen on imaging.  He initially came to us for abnormal liver function tests.  He did have risk factors like hyperlipidemia and obesity and now he is on Crestor and he lost over 50 pounds.    Patient denies jaundice, lower extremity edema, abdominal distension, lethargy or confusion. Patient denies melena, hematemesis or hematochezia.  He did not have any abdominal pain, nausea or vomiting.  He had regular bowel movement is like twice a day.  And he had done his vaccination for COVID with Pfizer and has done so far 3 doses. Patient denies fevers, sweats or chills.  Current    Medical hx Surgical hx   Past Medical History:   Diagnosis Date     Fatty liver      Otitis media with effusion     chronic     Varicella     about age 3      Past Surgical History:   Procedure Laterality Date     PE TUBES       SURGICAL HISTORY OF -       arm surgery          Medications  Prior to Admission medications    Medication Sig Start Date End Date Taking? Authorizing Provider   hydrOXYzine (ATARAX) 25 MG tablet Take 1 tablet (25 mg) by mouth 3 times daily as needed for itching 6/21/21  Yes Toby Beaulieu MD   rosuvastatin (CRESTOR) 20 MG tablet Take 1 tablet (20 mg) by mouth daily 10/6/21  Yes Twyla Sterling APRN CNP       Allergies  Allergies   Allergen Reactions     Codeine      Hyper       Review of systems  A 10-point review of systems was negative    Examination  /83 (BP Location: Right arm, Patient Position: Sitting, Cuff Size: Adult Large)   Pulse 69   Ht  1.829 m (6')   Wt 101.6 kg (224 lb)   SpO2 100%   BMI 30.38 kg/m    Body mass index is 30.38 kg/m .    Gen- well, NAD, A+Ox3, normal color  Lym- no palpable LAD  CVS- RRR  RS- CTA  Abd-mildly obese nontender.  Extr- hands normal, no MATTHEW  Skin- no rash or jaundice  Neuro- no asterixis  Psych- normal mood    Laboratory  Lab Results   Component Value Date     02/14/2022     02/13/2008    POTASSIUM 4.1 02/14/2022    POTASSIUM 3.6 02/13/2008    CHLORIDE 109 02/14/2022    CHLORIDE 101 02/13/2008    CO2 26 02/14/2022    CO2 28 02/13/2008    BUN 11 02/14/2022    BUN 7 02/13/2008    CR 0.92 02/14/2022    CR 0.50 02/13/2008       Lab Results   Component Value Date    BILITOTAL 1.6 02/14/2022    ALT 49 02/14/2022    AST 20 02/14/2022    ALKPHOS 57 02/14/2022       Lab Results   Component Value Date    ALBUMIN 4.1 02/14/2022    PROTTOTAL 7.7 02/14/2022        Lab Results   Component Value Date    WBC 8.0 08/15/2022    WBC 15.6 02/13/2008    HGB 15.9 08/15/2022    HGB 12.3 02/13/2008    MCV 87 08/15/2022    MCV 81 02/13/2008     08/15/2022     02/13/2008       Lab Results   Component Value Date    INR 0.99 02/14/2022       Radiology    Assessment  22 year old male with multiple risk factors for HOANG.  He came to us for abnormal liver function test.  He had since our initial visit Crestor for his hyperlipidemia and he lost over 50 pounds.  In fact his liver function tests requested normalized.  Please note that the patient has stopped drinking any alcoholic beverages for the last 1 year.    Plan    We repeated his labs today and they are still pending.  He will be seen here also in 6 months.  Otherwise he will follow with his primary care physician who is going to monitor his labs.    I spent 30 minutes on this encounter, of August 15, 2022 in chart reviewing, history taking, physical examination and documentation.  Some of the time was also spent in coordination of care and counseling.    Carlota  Beni Heard MD  Hepatology  [facility]      Again, thank you for allowing me to participate in the care of your patient.        Sincerely,        Carlota Heard MD

## 2022-08-26 ENCOUNTER — VIRTUAL VISIT (OUTPATIENT)
Dept: BEHAVIORAL HEALTH | Facility: HOSPITAL | Age: 22
End: 2022-08-26
Attending: PSYCHIATRY & NEUROLOGY
Payer: COMMERCIAL

## 2022-08-26 DIAGNOSIS — F43.23 ADJUSTMENT DISORDER WITH MIXED ANXIETY AND DEPRESSED MOOD: Primary | ICD-10-CM

## 2022-08-26 PROCEDURE — 90834 PSYTX W PT 45 MINUTES: CPT | Mod: 95 | Performed by: COUNSELOR

## 2022-08-26 NOTE — PROGRESS NOTES
[facility]                                      Progress Note    Patient Name: Jamari Cota  Date: 8/26/22         Service Type: Individual      Session Start Time: 1:00 pm  Session End Time: 1:43 pm     Session Length: 43 minutes    Session #: 15    Attendees: Client attended alone    Service Modality:  Video Visit:      Provider verified identity through the following two step process.  Patient provided:  Patient is known previously to provider    Telemedicine Visit: The patient's condition can be safely assessed and treated via synchronous audio and visual telemedicine encounter.      Reason for Telemedicine Visit: Services only offered telehealth    Originating Site (Patient Location): Patient's home     Distant Site (Provider Location): Provider Remote Setting- Home Office    Consent:  The patient/guardian has verbally consented to: the potential risks and benefits of telemedicine (video visit) versus in person care; bill my insurance or make self-payment for services provided; and responsibility for payment of non-covered services.     Patient would like the video invitation sent by:  My Chart    Mode of Communication:  Video Conference via [employer]    As the provider I attest to compliance with applicable laws and regulations related to telemedicine.    DATA  Interactive Complexity: No  Crisis: No  Extended Session (53+ minutes): No      Progress Since Last Session (Related to Symptoms / Goals / Homework):   Symptoms: have been very mixed. Pt reported that he has had a lot of highs and lows, causing these past two weeks to be very stressful     Homework: Partially completed      Episode of Care Goals: Satisfactory progress - ACTION (Actively working towards change); Intervened by reinforcing change plan / affirming steps taken     Current / Ongoing Stressors and Concerns:   Pt is currently seeking therapy related to depression and anxiety around self-exploration related to his sexual  "identity (bi-sexual, possibly homosexual), and has some lingering shame around his sexual identity, which he is still trying to figure out. Pt grew up in a very conservative Sabianism home, where same-sex relations were not supported, which has caused him to have an internal struggle between perceived expectations and his desire to be authentic. Since last session, pt reported that things have been challenging/frustrating and is feeling a bit lost/aimless. 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 His work transition continues to feel manageable and pt is feeling more confident he made the right decision in switching roles, but he admits to there being some information overload he is dealing with. Last weekend he was pressured/guilted into helping with a family event, during which pt became overwhelmed and had a panic attack due to concerns about how he was being perceived. Pt left the event and decided he needed to start the process of coming out, as the core concern of being outed was what triggered his panic attack. Pt proceeded to go see his step dad and came out to just him, including verbalizin how some of his past decisions were driven by trying to hide his sexualirty. Pt reported that he was very emotional and cried a lot during the exchange, and yet his step-dad took it well, trying to be supportive. Pt is concerned that his step-dad will out hi to his mom, despite being told they wouldn't, and has been avoiding his family since then. Pt was encouraged to not avoid his family, and also validated that he didn't need to talk about anything or come out to anyone else until he is ready. Pt expressed being unsure how he felt about the situation now that he has started coming out, other then feeling like he \"is in free fall\". Pt expressed feeling frustrated with his family and thoughts that he is alone " without them, which makes him feel very stuck. Pt acknowledged that talking about helped him feel better, but he still feels unsure what he will do next. Pt has a family trip this coming weekend which he hopes will go well and is looking forward to, but knows that this topic might come up which is causing him anxiety. Pt was challenged to do the interoceptive work that was discussed previously, as the physical symptoms of his anxiety appear to be a self-escalation point for him still. Pt was resistant to this idea, though would consider it.    Topic for the future: second round of dating with ex-girlfriend; normalizing LGBT through media exposure; Cognitive distortions. wanting to not use delta 8 to manage anxiety (helpful vs. Unhelpful coping skills)    Current coping skills-  Music  Breathing technique  D8 pen (vape) - unhelpful  Eating - unhelpful  Socializing  Reading to escape (games books dnd) - unhelpful in excess   Exercising  Singing (car singing)  Staying busy at work a lot with low-priority or off tasks - unhelpful    Look up LGBT people who are surprising to pt, and bring 3 examples - openly singh [employer] member, music artists, j[employer] youtube channel: middle ground   Make list of 5 potential jobs or job fields you may want to go into - doesn't feel he has the confidence to apply for some jobs due to internalizing some feelings of past failure.      Treatment Objective(s) Addressed in This Session:   identify current thoughts, triggers, and stressors which contribute to feelings of depression  identify unhelpful or unrealistic fears / thoughts that contribute to feeling anxious  Increase interest, engagement, and pleasure in doing things  Decrease frequency and intensity of feeling down, depressed, hopeless  Feel less tired and more energy during the day   Identify negative self-talk and behaviors: challenge core beliefs, myths, and actions  identify coping strategies for improving mood  identify two areas of  life that you would like to have improved functioning     Intervention:   CBT: Discussed the internal conflict/disconnect between his heart and his brain (thoughts and feelings).   Psychodynamic: Processed internal-experiences related to being hurt in past relationships.   Solution Focused: Identified areas in which pt is self-imposing barriers and possible strategies to overcome them.    Motivational Interviewing    MI Intervention: Expressed Empathy/Understanding, Supported Autonomy, Collaboration, Evocation, Open-ended questions, Reflections: simple and complex and Reframe     Change Talk Expressed by the Patient: Desire to change Ability to change Reasons to change Committment to change Activation Taking steps    Provider Response to Change Talk: E - Evoked more info from patient about behavior change, A - Affirmed patient's thoughts, decisions, or attempts at behavior change, R - Reflected patient's change talk and S - Summarized patient's change talk statements        Assessments completed prior to visit:  PHQ2:   PHQ-2 ( 1999 [employer]) 7/22/2022 7/15/2022 7/1/2022 6/17/2022 3/18/2022 12/14/2021 8/4/2016   Q1: Little interest or pleasure in doing things 1 1 1 1 1 1 0   Q2: Feeling down, depressed or hopeless 1 1 1 1 1 1 0   PHQ-2 Score 2 2 2 2 2 2 0   Q1: Little interest or pleasure in doing things Several days Several days Several days Several days Several days Several days -   Q2: Feeling down, depressed or hopeless Several days Several days Several days Several days Several days Several days -   PHQ-2 Score 2 2 2 2 2 2 -     PHQ9:   PHQ-9 SCORE 7/13/2015 12/3/2021 1/24/2022 2/21/2022   PHQ-9 Total Score 17 - - -   PHQ-9 Total Score MyChart - 6 (Mild depression) - -   PHQ-9 Total Score - 6 8 6     GAD2:   MILAD-2 12/14/2021 3/18/2022 6/17/2022 6/17/2022 7/1/2022 7/15/2022 7/15/2022   Feeling nervous, anxious, or on edge 1 1 1 1 1 1 1   Not being able to stop or control worrying 1 1 1 1 1 1 1   MILAD-2 Total Score  2 2 2 2 2 2 2     GAD7:   MILAD-7 SCORE 12/3/2021 1/24/2022 2/21/2022   Total Score 5 (mild anxiety) - -   Total Score 5 7 8     PROMIS 10-Global Health (only subscores and total score):   PROMIS-10 Scores Only 2/21/2022 3/18/2022 6/17/2022 6/17/2022 7/1/2022 7/15/2022 7/15/2022   Global Mental Health Score 14 11 11 11 11 11 11   Global Physical Health Score 17 16 14 14 17 17 17   PROMIS TOTAL - SUBSCORES 31 27 25 25 28 28 28         ASSESSMENT: Current Emotional / Mental Status (status of significant symptoms):   Risk status (Self / Other harm or suicidal ideation)   Patient denies current fears or concerns for personal safety.   Patient denies current or recent suicidal ideation or behaviors.   Patient denies current or recent homicidal ideation or behaviors.   Patient denies current or recent self injurious behavior or ideation.   Patient denies other safety concerns.   Patient reports there has been no change in risk factors since their last session.     Patient reports there has been no change in protective factors since their last session.     Recommended that patient call 911 or go to the local ED should there be a change in any of these risk factors.     Appearance:   Appropriate    Eye Contact:   Good    Psychomotor Behavior: Normal    Attitude:   Cooperative  Pleasant   Orientation:   All   Speech    Rate / Production: Normal/ Responsive Talkative    Volume:  Normal    Mood:    Anxious  Depressed  Normal frustrated   Affect:    Appropriate  Blunted    Thought Content:  Clear    Thought Form:  Coherent  Logical    Insight:    Fair , External locus and Intellectual Insight     Medication Review:   No changes to current psychiatric medication(s)     Medication Compliance:   Yes     Changes in Health Issues:   None reported     Chemical Use Review:   Substance Use: Chemical use reviewed, no active concerns identified      Tobacco Use: No current tobacco use.      Diagnosis:  1. Adjustment disorder with mixed  anxiety and depressed mood        Collateral Reports Completed:   Not Applicable    PLAN: (Patient Tasks / Therapist Tasks / Other)  Review and complete initial interoceptives packet send through [employer]  Go on family trip and focus on the moment  Continue to wear the pride bracellet  Think about how you can express your grievances to you family constructively    Follow-up on latter:  Complete Assessment Sent through [employer]  Continue to work on seeing the trees instead of the forest       Keaton Stein [facility]                                                       ______________________________________________________________________    Individual Treatment Plan    Patient's Name: Jamari Cota  YOB: 2000    Date of Creation: 3/4/22  Date Treatment Plan Last Reviewed/Revised: 7/15/22    DSM5 Diagnoses: Adjustment Disorders  309.28 (F43.23) With mixed anxiety and depressed mood  Psychosocial / Contextual Factors: concerns about acceptance by family, pt has started dating, financial stressors  PROMIS (reviewed every 90 days):   PROMIS 10-Global Health (only subscores and total score):   PROMIS-10 Scores Only 2/21/2022 3/18/2022 6/17/2022 6/17/2022 7/1/2022 7/15/2022 7/15/2022   Global Mental Health Score 14 11 11 11 11 11 11   Global Physical Health Score 17 16 14 14 17 17 17   PROMIS TOTAL - SUBSCORES 31 27 25 25 28 28 28       Referral / Collaboration:  Referral to another professional/service is not indicated at this time..    Anticipated number of session for this episode of care: 8-13  Anticipation frequency of session: Biweekly  Anticipated Duration of each session: 38-52 minutes  Treatment plan will be reviewed in 90 days or when goals have been changed.       MeasurableTreatment Goal(s) related to diagnosis / functional impairment(s)  Goal 1: Patient will develop helpful coping skills, while learning to recognize unhelpful ones, and increase/expand his support system.     I will know I've met my  goal when I am more able to feel more confident in myself and decisions/presetation.     Objective #A (Patient Action)    Patient will identify helpful vs unhelpful coping strategies to more effectively address stressors  exercise for 30 minutes 3 times per week for 30 minutes  Decrease frequency and intensity of feeling down, depressed, hopeless  Improve diet, appetite, mindful eating, and / or meal planning  Identify negative self-talk and behaviors: challenge core beliefs, myths, and actions  identify 5 coping strategies for improving mood  practice one mindfulness skill each day for 5-10 minutes.  Status: Continued - Date(s):7/15/22     Intervention(s)  Therapist will assign homework related to target objective with the intent to promote pt autonomy and better manage MH.  Facilitate increase in self-awareness  Provide psycho-education on mindfulness and helpful vs. unhelpful coping identification  Teach/promote utilization of coping skill development/exploration and mindfulness techniques    Objective #B Pt will learn how to develop and identify a helpful support system.  Patient will participate in social or interpersonal activities to improve mood  attend and participate in social or recreational activities to expand social network/support  Increase interest, engagement, and pleasure in doing things  Identify negative self-talk and behaviors: challenge core beliefs, myths, and actions  track and record at least 3 pleasant exchanges with friends and new people.  Status: Continued - Date(s): 7/15/22     Intervention(s)  Therapist will assign homework related to target objective with the intent to promote pt autonomy and better manage MH.  Facilitate increase in self-awareness  Provide psycho-education on social distortions/fallacies and boundaries  Teach/promote utilization of social engagement skills and problem solving    Goal 2: Patient will be more able to engage socially and feel less anxious when  "interacting in a group setting.     I will know I've met my goal when I am able to be less stressed/anxious in social interactions, especially in group settings/activities.      Objective #A (Patient Action)    Status: Continued - Date(s): 7/15/22     Patient will identify and compliment positives at least 3 times daily to support positive self-image  identify unhelpful or unrealistic thoughts/concerns and stressors which contribute to feelings of anxiety  learn and demonstrate pro-social and communication assertiveness skill(s)  initiate 3 social contact(s) per day for increasing lengths of time  practice setting boundaries 3 times in the next several weeks  accept compliment with a \"thank you\" and no self deprecating comments.    Intervention(s)  Therapist will assign homework related to target objective with the intent to promote pt autonomy and better manage MH.  Facilitate increase in self-awareness  Provide psycho-education on social anxiety thinking and communication styles  Teach/promote utilization of thought challenging and self-confidence building    Goal 3: Patient will work on processing through self-identity and how to express his authentic or genuine self.     I will know I've met my goal when I feel more comfortable being my authentic or genuine self.      Objective #A (Patient Action)  Status: Continued - Date(s): 7/15/22     Patient will identify communication strategies to more effectively address stressors  use thought-stopping strategy daily to reduce intrusive thoughts  Identify negative self-talk and behaviors: challenge core beliefs, myths, and actions  identify how the use of substances contributes to the avoidance of feeling associated with the loss  identify the time in your life when you began to feel poorly about themselves.  Use positive self-affirmation daily to promote self-acceptance.    Intervention(s)  Therapist will assign homework related to target objective with the intent to " promote pt autonomy and better manage MH.  Facilitate increase in self-awareness  Provide psycho-education on self-compassion and anticipatory grief and avoidance  Teach/promote utilization of self-care/acceptance and processing through internal-experiences    Patient has reviewed and agreed to the above plan.      Keaton Stein  [facility]  March 4, 2022

## 2022-09-02 ENCOUNTER — OFFICE VISIT (OUTPATIENT)
Dept: FAMILY MEDICINE | Facility: CLINIC | Age: 22
End: 2022-09-02
Payer: COMMERCIAL

## 2022-09-02 VITALS
HEART RATE: 91 BPM | WEIGHT: 219.6 LBS | BODY MASS INDEX: 29.74 KG/M2 | SYSTOLIC BLOOD PRESSURE: 122 MMHG | TEMPERATURE: 97.6 F | OXYGEN SATURATION: 100 % | HEIGHT: 72 IN | DIASTOLIC BLOOD PRESSURE: 82 MMHG | RESPIRATION RATE: 16 BRPM

## 2022-09-02 DIAGNOSIS — E78.2 MIXED HYPERLIPIDEMIA: ICD-10-CM

## 2022-09-02 DIAGNOSIS — K75.81 NASH (NONALCOHOLIC STEATOHEPATITIS): ICD-10-CM

## 2022-09-02 DIAGNOSIS — E78.00 HYPERCHOLESTEREMIA: ICD-10-CM

## 2022-09-02 DIAGNOSIS — Z00.00 ROUTINE GENERAL MEDICAL EXAMINATION AT A HEALTH CARE FACILITY: Primary | ICD-10-CM

## 2022-09-02 PROCEDURE — 80061 LIPID PANEL: CPT | Performed by: FAMILY MEDICINE

## 2022-09-02 PROCEDURE — 36415 COLL VENOUS BLD VENIPUNCTURE: CPT | Performed by: FAMILY MEDICINE

## 2022-09-02 PROCEDURE — 90715 TDAP VACCINE 7 YRS/> IM: CPT | Performed by: FAMILY MEDICINE

## 2022-09-02 PROCEDURE — 90471 IMMUNIZATION ADMIN: CPT | Performed by: FAMILY MEDICINE

## 2022-09-02 PROCEDURE — 99395 PREV VISIT EST AGE 18-39: CPT | Mod: 25 | Performed by: FAMILY MEDICINE

## 2022-09-02 RX ORDER — ROSUVASTATIN CALCIUM 20 MG/1
20 TABLET, COATED ORAL DAILY
Qty: 90 TABLET | Refills: 3 | Status: SHIPPED | OUTPATIENT
Start: 2022-09-30 | End: 2023-03-30

## 2022-09-02 RX ORDER — LEVOCETIRIZINE DIHYDROCHLORIDE 5 MG/1
5 TABLET, FILM COATED ORAL
COMMUNITY
End: 2023-03-30

## 2022-09-02 ASSESSMENT — ANXIETY QUESTIONNAIRES
1. FEELING NERVOUS, ANXIOUS, OR ON EDGE: NOT AT ALL
IF YOU CHECKED OFF ANY PROBLEMS ON THIS QUESTIONNAIRE, HOW DIFFICULT HAVE THESE PROBLEMS MADE IT FOR YOU TO DO YOUR WORK, TAKE CARE OF THINGS AT HOME, OR GET ALONG WITH OTHER PEOPLE: NOT DIFFICULT AT ALL
3. WORRYING TOO MUCH ABOUT DIFFERENT THINGS: NOT AT ALL
7. FEELING AFRAID AS IF SOMETHING AWFUL MIGHT HAPPEN: NOT AT ALL
6. BECOMING EASILY ANNOYED OR IRRITABLE: NOT AT ALL
GAD7 TOTAL SCORE: 1
GAD7 TOTAL SCORE: 1
5. BEING SO RESTLESS THAT IT IS HARD TO SIT STILL: NOT AT ALL
2. NOT BEING ABLE TO STOP OR CONTROL WORRYING: NOT AT ALL

## 2022-09-02 ASSESSMENT — ENCOUNTER SYMPTOMS
CONSTIPATION: 0
JOINT SWELLING: 0
SHORTNESS OF BREATH: 0
PARESTHESIAS: 0
HEARTBURN: 0
SORE THROAT: 0
FREQUENCY: 0
HEMATURIA: 0
DIZZINESS: 0
WEAKNESS: 0
DIARRHEA: 0
EYE PAIN: 0
MYALGIAS: 0
ABDOMINAL PAIN: 0
NERVOUS/ANXIOUS: 0
HEADACHES: 0
ARTHRALGIAS: 0
NAUSEA: 0
DYSURIA: 0
CHILLS: 0
FEVER: 0
PALPITATIONS: 0
HEMATOCHEZIA: 0
COUGH: 0

## 2022-09-02 ASSESSMENT — PATIENT HEALTH QUESTIONNAIRE - PHQ9
5. POOR APPETITE OR OVEREATING: SEVERAL DAYS
SUM OF ALL RESPONSES TO PHQ QUESTIONS 1-9: 1

## 2022-09-02 NOTE — PROGRESS NOTES
SUBJECTIVE:   CC: Jamari Cota is an 22 year old male who presents for preventative health visit.     {  Patient has been advised of split billing requirements and indicates understanding: Yes        Healthy Habits:     Getting at least 3 servings of Calcium per day:  Yes    Bi-annual eye exam:  NO    Dental care twice a year:  NO    Sleep apnea or symptoms of sleep apnea:  None    Diet:  Regular (no restrictions)    Frequency of exercise:  2-3 days/week    Duration of exercise:  30-45 minutes    Taking medications regularly:  Yes    Medication side effects:  Not applicable    PHQ-2 Total Score: 0    Additional concerns today:  No      Depression Followup    How are you doing with your depression since your last visit? Improved, works with a therapist.     Are you having other symptoms that might be associated with depression? No    Have you had a significant life event?  No     Are you feeling anxious or having panic attacks?   No    Do you have any concerns with your use of alcohol or other drugs? No    Social History     Tobacco Use     Smoking status: Never Smoker     Smokeless tobacco: Never Used     Tobacco comment: non smoking home, mother and step father smoke   Vaping Use     Vaping Use: Never used   Substance Use Topics     Alcohol use: No     Alcohol/week: 0.0 standard drinks     Drug use: No     PHQ 9/2/2022   PHQ-9 Total Score 1   Q9: Thoughts of better off dead/self-harm past 2 weeks Not at all   Some encounter information is confidential and restricted. Go to Review Flowsheets activity to see all data.     MILAD-7 SCORE 12/3/2021 9/2/2022   Total Score 5 (mild anxiety) -   Total Score - 1   Some encounter information is confidential and restricted. Go to Review Flowsheets activity to see all data.     Last PHQ-9 9/2/2022   1.  Little interest or pleasure in doing things 0   2.  Feeling down, depressed, or hopeless 0   3.  Trouble falling or staying asleep, or sleeping too much 0   4.  Feeling tired  or having little energy 0   5.  Poor appetite or overeating 1   6.  Feeling bad about yourself 0   7.  Trouble concentrating 0   8.  Moving slowly or restless 0   Q9: Thoughts of better off dead/self-harm past 2 weeks 0   PHQ-9 Total Score 1   Difficulty at work, home, or with people Not difficult at all   Some encounter information is confidential and restricted. Go to Review First To File activity to see all data.     MILAD-7  9/2/2022   1. Feeling nervous, anxious, or on edge 0   2. Not being able to stop or control worrying 0   3. Worrying too much about different things 0   4. Trouble relaxing 1   5. Being so restless that it is hard to sit still 0   6. Becoming easily annoyed or irritable 0   7. Feeling afraid, as if something awful might happen 0   MILAD-7 Total Score 1   If you checked any problems, how difficult have they made it for you to do your work, take care of things at home, or get along with other people? Not difficult at all   Some encounter information is confidential and restricted. Go to Review First To File activity to see all data.       Today's PHQ-2 Score:   PHQ-2 ( 1999 [employer]) 9/2/2022   Q1: Little interest or pleasure in doing things 0   Q2: Feeling down, depressed or hopeless 0   PHQ-2 Score 0   Q1: Little interest or pleasure in doing things Not at all   Q2: Feeling down, depressed or hopeless Not at all   PHQ-2 Score 0   Some encounter information is confidential and restricted. Go to Review First To File activity to see all data.     Abuse: Current or Past(Physical, Sexual or Emotional)- No  Do you feel safe in your environment? Yes    Have you ever done Advance Care Planning? (For example, a Health Directive, POLST, or a discussion with a medical provider or your loved ones about your wishes): No, advance care planning information given to patient to review.  Patient declined advance care planning discussion at this time.    Social History     Tobacco Use     Smoking status: Never Smoker      Smokeless tobacco: Never Used     Tobacco comment: non smoking home, mother and step father smoke   Substance Use Topics     Alcohol use: No     Alcohol/week: 0.0 standard drinks     If you drink alcohol do you typically have >3 drinks per day or >7 drinks per week? No    Alcohol Use 9/2/2022   Prescreen: >3 drinks/day or >7 drinks/week? Not Applicable   Prescreen: >3 drinks/day or >7 drinks/week? -       Last PSA: No results found for: PSA    Reviewed orders with patient. Reviewed health maintenance and updated orders accordingly - Yes  Lab work is in process    Reviewed and updated as needed this visit by clinical staff   Tobacco  Allergies  Meds     Fam Hx            Reviewed and updated as needed this visit by Provider                   Past Medical History:   Diagnosis Date     Fatty liver      Otitis media with effusion     chronic     Varicella     about age 3      Past Surgical History:   Procedure Laterality Date     PE TUBES       SURGICAL HISTORY OF -       arm surgery       Review of Systems   Constitutional: Negative for chills and fever.   HENT: Negative for congestion, ear pain, hearing loss and sore throat.    Eyes: Negative for pain and visual disturbance.   Respiratory: Negative for cough and shortness of breath.    Cardiovascular: Negative for chest pain, palpitations and peripheral edema.   Gastrointestinal: Negative for abdominal pain, constipation, diarrhea, heartburn, hematochezia and nausea.   Genitourinary: Negative for dysuria, frequency, genital sores, hematuria, impotence, penile discharge and urgency.   Musculoskeletal: Negative for arthralgias, joint swelling and myalgias.   Skin: Negative for rash.   Neurological: Negative for dizziness, weakness, headaches and paresthesias.   Psychiatric/Behavioral: Negative for mood changes. The patient is not nervous/anxious.        OBJECTIVE:   /82   Pulse 91   Temp 97.6  F (36.4  C) (Tympanic)   Resp 16   Ht 1.829 m (6')   Wt 99.6  kg (219 lb 9.6 oz)   SpO2 100%   BMI 29.78 kg/m      Physical Exam  GENERAL: healthy, alert and no distress  EYES: Eyes grossly normal to inspection  NECK: no adenopathy and no asymmetry, masses, or scars  RESP: lungs clear to auscultation - no rales, rhonchi or wheezes  CV: regular rate and rhythm, normal S1 S2, no S3 or S4, no murmur, click or rub, no peripheral edema and peripheral pulses strong  ABDOMEN: soft, nontender, no hepatosplenomegaly, no masses and bowel sounds normal  MS: no gross musculoskeletal defects noted, no edema  SKIN: no suspicious lesions or rashes  PSYCH: mentation appears normal, affect normal/bright    Diagnostic Test Results:  Labs reviewed in Epic    ASSESSMENT/PLAN:       ICD-10-CM    1. Routine general medical examination at a health care facility  Z00.00    2. Hypercholesteremia  E78.00 rosuvastatin (CRESTOR) 20 MG tablet       Patient has been advised of split billing requirements and indicates understanding: Yes    COUNSELING:   Reviewed preventive health counseling, as reflected in patient instructions    Estimated body mass index is 29.78 kg/m  as calculated from the following:    Height as of this encounter: 1.829 m (6').    Weight as of this encounter: 99.6 kg (219 lb 9.6 oz).     Weight management plan: Discussed healthy diet and exercise guidelines    He reports that he has never smoked. He has never used smokeless tobacco.      Counseling Resources:  ATP IV Guidelines  Pooled Cohorts Equation Calculator  FRAX Risk Assessment  ICSI Preventive Guidelines  Dietary Guidelines for Americans, 2010  USDA's MyPlate  ASA Prophylaxis  Lung CA Screening    Jenaro Maldonado [facility]

## 2022-09-03 LAB
CHOLEST SERPL-MCNC: 169 MG/DL
FASTING STATUS PATIENT QL REPORTED: YES
HDLC SERPL-MCNC: 39 MG/DL
LDLC SERPL CALC-MCNC: 114 MG/DL
NONHDLC SERPL-MCNC: 130 MG/DL
TRIGL SERPL-MCNC: 80 MG/DL

## 2022-09-09 ENCOUNTER — VIRTUAL VISIT (OUTPATIENT)
Dept: BEHAVIORAL HEALTH | Facility: HOSPITAL | Age: 22
End: 2022-09-09
Attending: PSYCHIATRY & NEUROLOGY
Payer: COMMERCIAL

## 2022-09-09 DIAGNOSIS — F43.23 ADJUSTMENT DISORDER WITH MIXED ANXIETY AND DEPRESSED MOOD: Primary | ICD-10-CM

## 2022-09-09 PROCEDURE — 90834 PSYTX W PT 45 MINUTES: CPT | Mod: 95 | Performed by: COUNSELOR

## 2022-09-09 NOTE — PROGRESS NOTES
[facility]                                      Progress Note    Patient Name: Jamari Cota  Date: 9/09/22         Service Type: Individual      Session Start Time: 1:00 pm  Session End Time: 1:52 pm     Session Length: 52 minutes    Session #: 16    Attendees: Client attended alone    Service Modality:  Video Visit:      Provider verified identity through the following two step process.  Patient provided:  Patient is known previously to provider    Telemedicine Visit: The patient's condition can be safely assessed and treated via synchronous audio and visual telemedicine encounter.      Reason for Telemedicine Visit: Services only offered telehealth    Originating Site (Patient Location): Patient's other car     Distant Site (Provider Location): Provider Remote Setting- Home Office    Consent:  The patient/guardian has verbally consented to: the potential risks and benefits of telemedicine (video visit) versus in person care; bill my insurance or make self-payment for services provided; and responsibility for payment of non-covered services.     Patient would like the video invitation sent by:  My Chart    Mode of Communication:  Video Conference via [employer]    As the provider I attest to compliance with applicable laws and regulations related to telemedicine.    DATA  Interactive Complexity: No  Crisis: No  Extended Session (53+ minutes): No      Progress Since Last Session (Related to Symptoms / Goals / Homework):   Symptoms: Improving symptoms, pt has been feeling better overall     Homework: Partially completed      Episode of Care Goals: Satisfactory progress - PREPARATION (Decided to change - considering how); Intervened by negotiating a change plan and determining options / strategies for behavior change, identifying triggers, exploring social supports, and working towards setting a date to begin behavior change     Current / Ongoing Stressors and Concerns:   Pt is currently seeking  therapy related to depression and anxiety around self-exploration related to his sexual identity (bi-sexual, possibly homosexual), and has some lingering shame around his sexual identity, which he is still trying to figure out. Pt grew up in a very conservative [employer] home, where same-sex relations were not supported, which has caused him to have an internal struggle between perceived expectations and his desire to be authentic. Since last session, things have been going well overall, though he has been dealing with a lack of AV and low sleep. Pt reflected that his transition into his new job is still going well, and that he is feeling good about this move, thinking that it is where he wants to be at this point. Pt reported that his family vacation went well, it was probably the best of all of them, still not great and yet still was a good trip; mom did not bring up pt coming out. Pt processed through how the last two weeks, including feeling like he has been isolating, due to COVID issues in his social [place], and this has been somewhat challenging. Pt restarted learning [employer] again to help with this, and has been trying to catch himself when reflecting to not get into a negative head space. Pt processed through how he misplaces guilt, which impacts his ability to engage in meaningful self-care or relaxation at times.    Topic for the future: second round of dating with ex-girlfriend; normalizing LGBT through media exposure; Cognitive distortions. wanting to not use delta 8 to manage anxiety (helpful vs. Unhelpful coping skills)    Current coping skills-  Music  Breathing technique  D8 pen (vape) - unhelpful  Eating - unhelpful  Socializing  Reading to escape (games books dnd) - unhelpful in excess   Exercising  Singing (car singing)  Staying busy at work a lot with low-priority or off tasks - unhelpful    Look up LGBT people who are surprising to pt, and bring 3 examples - openly singh [employer] member, music artists,  [employer] channel: middle ground   Make list of 5 potential jobs or job fields you may want to go into - doesn't feel he has the confidence to apply for some jobs due to internalizing some feelings of past failure.      Treatment Objective(s) Addressed in This Session:   identify current thoughts, triggers, and stressors which contribute to feelings of depression  identify unhelpful or unrealistic fears / thoughts that contribute to feeling anxious  Increase interest, engagement, and pleasure in doing things  Decrease frequency and intensity of feeling down, depressed, hopeless  Feel less tired and more energy during the day   Identify negative self-talk and behaviors: challenge core beliefs, myths, and actions  identify coping strategies for improving mood  identify two areas of life that you would like to have improved functioning     Intervention:   CBT: Discussed the internal conflict/disconnect between his heart and his brain (thoughts and feelings).   Psychodynamic: Processed internal-experiences related to being hurt in past relationships.   Solution Focused: Identified areas in which pt is self-imposing barriers and possible strategies to overcome them.    Motivational Interviewing    MI Intervention: Expressed Empathy/Understanding, Supported Autonomy, Collaboration, Evocation, Open-ended questions, Reflections: simple and complex and Reframe     Change Talk Expressed by the Patient: Desire to change Ability to change Reasons to change Committment to change Activation Taking steps    Provider Response to Change Talk: E - Evoked more info from patient about behavior change, A - Affirmed patient's thoughts, decisions, or attempts at behavior change, R - Reflected patient's change talk and S - Summarized patient's change talk statements        Assessments completed prior to visit:  PHQ2:   PHQ-2 ( 1999 [employer]) 9/2/2022 9/2/2022 7/22/2022 7/15/2022 7/1/2022 6/17/2022 3/18/2022   Q1: Little interest or  pleasure in doing things 0 0 1 1 1 1 1   Q2: Feeling down, depressed or hopeless 0 0 1 1 1 1 1   PHQ-2 Score 0 0 2 2 2 2 2   Q1: Little interest or pleasure in doing things Not at all - Several days Several days Several days Several days Several days   Q2: Feeling down, depressed or hopeless Not at all - Several days Several days Several days Several days Several days   PHQ-2 Score 0 - 2 2 2 2 2     PHQ9:   PHQ-9 SCORE 7/13/2015 12/3/2021 1/24/2022 2/21/2022 9/2/2022   PHQ-9 Total Score 17 - - - -   PHQ-9 Total Score MyChart - 6 (Mild depression) - - -   PHQ-9 Total Score - 6 8 6 1     GAD2:   MILAD-2 12/14/2021 3/18/2022 6/17/2022 6/17/2022 7/1/2022 7/15/2022 7/15/2022   Feeling nervous, anxious, or on edge 1 1 1 1 1 1 1   Not being able to stop or control worrying 1 1 1 1 1 1 1   MILAD-2 Total Score 2 2 2 2 2 2 2     GAD7:   MILAD-7 SCORE 12/3/2021 1/24/2022 2/21/2022 9/2/2022   Total Score 5 (mild anxiety) - - -   Total Score 5 7 8 1     PROMIS 10-Global Health (only subscores and total score):   PROMIS-10 Scores Only 2/21/2022 3/18/2022 6/17/2022 6/17/2022 7/1/2022 7/15/2022 7/15/2022   Global Mental Health Score 14 11 11 11 11 11 11   Global Physical Health Score 17 16 14 14 17 17 17   PROMIS TOTAL - SUBSCORES 31 27 25 25 28 28 28         ASSESSMENT: Current Emotional / Mental Status (status of significant symptoms):   Risk status (Self / Other harm or suicidal ideation)   Patient denies current fears or concerns for personal safety.   Patient denies current or recent suicidal ideation or behaviors.   Patient denies current or recent homicidal ideation or behaviors.   Patient denies current or recent self injurious behavior or ideation.   Patient denies other safety concerns.   Patient reports there has been no change in risk factors since their last session.     Patient reports there has been no change in protective factors since their last session.     Recommended that patient call 911 or go to the local ED should  there be a change in any of these risk factors.     Appearance:   Appropriate    Eye Contact:   Good    Psychomotor Behavior: Normal    Attitude:   Cooperative  Pleasant   Orientation:   All   Speech    Rate / Production: Normal/ Responsive Talkative    Volume:  Normal    Mood:    Anxious  Depressed  Normal   Affect:    Appropriate  Lethargic    Thought Content:  Clear    Thought Form:  Coherent  Logical    Insight:    Fair  and Intellectual Insight     Medication Review:   No changes to current psychiatric medication(s)     Medication Compliance:   Yes     Changes in Health Issues:   None reported     Chemical Use Review:   Substance Use: Chemical use reviewed, no active concerns identified      Tobacco Use: No current tobacco use.      Diagnosis:  1. Adjustment disorder with mixed anxiety and depressed mood        Collateral Reports Completed:   Not Applicable    PLAN: (Patient Tasks / Therapist Tasks / Other)  Review and complete initial interoceptives packet send through [employer]  Continue to work on engaging in self-care/relaxations activities  Ask self when feeling guilty if this is misplaced or not  Continue to wear the pride bracellet  Think about how you can express your grievances to you family constructively    Follow-up on latter:  Complete Assessment Sent through [employer]  Continue to work on seeing the trees instead of the forest       Keaton Stein [facility]                                                       ______________________________________________________________________    Individual Treatment Plan    Patient's Name: Jamari Cota  YOB: 2000    Date of Creation: 3/4/22  Date Treatment Plan Last Reviewed/Revised: 7/15/22    DSM5 Diagnoses: Adjustment Disorders  309.28 (F43.23) With mixed anxiety and depressed mood  Psychosocial / Contextual Factors: concerns about acceptance by family, pt has started dating, financial stressors  PROMIS (reviewed every 90 days):   PROMIS 10-Global  Health (only subscores and total score):   PROMIS-10 Scores Only 2/21/2022 3/18/2022 6/17/2022 6/17/2022 7/1/2022 7/15/2022 7/15/2022   Global Mental Health Score 14 11 11 11 11 11 11   Global Physical Health Score 17 16 14 14 17 17 17   PROMIS TOTAL - SUBSCORES 31 27 25 25 28 28 28       Referral / Collaboration:  Referral to another professional/service is not indicated at this time..    Anticipated number of session for this episode of care: 8-13  Anticipation frequency of session: Biweekly  Anticipated Duration of each session: 38-52 minutes  Treatment plan will be reviewed in 90 days or when goals have been changed.       MeasurableTreatment Goal(s) related to diagnosis / functional impairment(s)  Goal 1: Patient will develop helpful coping skills, while learning to recognize unhelpful ones, and increase/expand his support system.     I will know I've met my goal when I am more able to feel more confident in myself and decisions/presetation.     Objective #A (Patient Action)    Patient will identify helpful vs unhelpful coping strategies to more effectively address stressors  exercise for 30 minutes 3 times per week for 30 minutes  Decrease frequency and intensity of feeling down, depressed, hopeless  Improve diet, appetite, mindful eating, and / or meal planning  Identify negative self-talk and behaviors: challenge core beliefs, myths, and actions  identify 5 coping strategies for improving mood  practice one mindfulness skill each day for 5-10 minutes.  Status: Continued - Date(s):7/15/22     Intervention(s)  Therapist will assign homework related to target objective with the intent to promote pt autonomy and better manage MH.  Facilitate increase in self-awareness  Provide psycho-education on mindfulness and helpful vs. unhelpful coping identification  Teach/promote utilization of coping skill development/exploration and mindfulness techniques    Objective #B Pt will learn how to develop and identify a  "helpful support system.  Patient will participate in social or interpersonal activities to improve mood  attend and participate in social or recreational activities to expand social network/support  Increase interest, engagement, and pleasure in doing things  Identify negative self-talk and behaviors: challenge core beliefs, myths, and actions  track and record at least 3 pleasant exchanges with friends and new people.  Status: Continued - Date(s): 7/15/22     Intervention(s)  Therapist will assign homework related to target objective with the intent to promote pt autonomy and better manage MH.  Facilitate increase in self-awareness  Provide psycho-education on social distortions/fallacies and boundaries  Teach/promote utilization of social engagement skills and problem solving    Goal 2: Patient will be more able to engage socially and feel less anxious when interacting in a group setting.     I will know I've met my goal when I am able to be less stressed/anxious in social interactions, especially in group settings/activities.      Objective #A (Patient Action)    Status: Continued - Date(s): 7/15/22     Patient will identify and compliment positives at least 3 times daily to support positive self-image  identify unhelpful or unrealistic thoughts/concerns and stressors which contribute to feelings of anxiety  learn and demonstrate pro-social and communication assertiveness skill(s)  initiate 3 social contact(s) per day for increasing lengths of time  practice setting boundaries 3 times in the next several weeks  accept compliment with a \"thank you\" and no self deprecating comments.    Intervention(s)  Therapist will assign homework related to target objective with the intent to promote pt autonomy and better manage MH.  Facilitate increase in self-awareness  Provide psycho-education on social anxiety thinking and communication styles  Teach/promote utilization of thought challenging and self-confidence " building    Goal 3: Patient will work on processing through self-identity and how to express his authentic or genuine self.     I will know I've met my goal when I feel more comfortable being my authentic or genuine self.      Objective #A (Patient Action)  Status: Continued - Date(s): 7/15/22     Patient will identify communication strategies to more effectively address stressors  use thought-stopping strategy daily to reduce intrusive thoughts  Identify negative self-talk and behaviors: challenge core beliefs, myths, and actions  identify how the use of substances contributes to the avoidance of feeling associated with the loss  identify the time in your life when you began to feel poorly about themselves.  Use positive self-affirmation daily to promote self-acceptance.    Intervention(s)  Therapist will assign homework related to target objective with the intent to promote pt autonomy and better manage MH.  Facilitate increase in self-awareness  Provide psycho-education on self-compassion and anticipatory grief and avoidance  Teach/promote utilization of self-care/acceptance and processing through internal-experiences    Patient has reviewed and agreed to the above plan.      Keaton Stein  [facility]  March 4, 2022

## 2022-09-23 ENCOUNTER — VIRTUAL VISIT (OUTPATIENT)
Dept: BEHAVIORAL HEALTH | Facility: HOSPITAL | Age: 22
End: 2022-09-23
Attending: PSYCHIATRY & NEUROLOGY
Payer: COMMERCIAL

## 2022-09-23 DIAGNOSIS — F43.23 ADJUSTMENT DISORDER WITH MIXED ANXIETY AND DEPRESSED MOOD: Primary | ICD-10-CM

## 2022-09-23 PROCEDURE — 90837 PSYTX W PT 60 MINUTES: CPT | Mod: 95 | Performed by: COUNSELOR

## 2022-09-23 NOTE — PROGRESS NOTES
[facility]                                      Progress Note    Patient Name: Jamari Cota  Date: 9/23/22         Service Type: Individual      Session Start Time: 1:03 pm  Session End Time: 2:00 pm     Session Length: 57 minutes    Session #: 17    Attendees: Client attended alone    Service Modality:  Video Visit:      Provider verified identity through the following two step process.  Patient provided:  Patient is known previously to provider    Telemedicine Visit: The patient's condition can be safely assessed and treated via synchronous audio and visual telemedicine encounter.      Reason for Telemedicine Visit: Services only offered telehealth    Originating Site (Patient Location): Patient's other car     Distant Site (Provider Location): Provider Remote Setting- Home Office    Consent:  The patient/guardian has verbally consented to: the potential risks and benefits of telemedicine (video visit) versus in person care; bill my insurance or make self-payment for services provided; and responsibility for payment of non-covered services.     Patient would like the video invitation sent by:  My Chart    Mode of Communication:  Video Conference via [employer]    As the provider I attest to compliance with applicable laws and regulations related to telemedicine.    DATA  Interactive Complexity: No  Crisis: No  Extended Session (53+ minutes): PROLONGED SERVICE IN THE OUTPATIENT SETTING REQUIRING DIRECT (FACE-TO-FACE) PATIENT CONTACT BEYOND THE USUAL SERVICE:    - Patient's presenting concerns require more intensive intervention than could be completed within the usual service      Progress Since Last Session (Related to Symptoms / Goals / Homework):   Symptoms: Improving symptoms, pt has been feeling better overall     Homework: Partially completed      Episode of Care Goals: Satisfactory progress - ACTION (Actively working towards change); Intervened by reinforcing change plan / affirming  steps taken     Current / Ongoing Stressors and Concerns:   Pt is currently seeking therapy related to depression and anxiety around self-exploration related to his sexual identity (bi-sexual, possibly homosexual), and has some lingering shame around his sexual identity, which he is still trying to figure out. Pt grew up in a very conservative [employer] home, where same-sex relations were not supported, which has caused him to have an internal struggle between perceived expectations and his desire to be authentic. Since last session, pt reported that his mood has been pretty stable, feeling overall pretty good, and yet his anxiety has been feeling more present, especially in social situations. Pt processed through how his anxiety has been more challenging when in conversations and how it has been getting worse. Upon reflection pt recognized that at least a portion of his anxiety has been driven by uncertainty in conversations and that he has been more socially withdrawn which has made talking with others a bit less familiar. Pt has been living alone now for three weeks and he has found that he hates it, as it has resulted in him spending a lot of time in his own head. Pt recognizes how regular social interaction with others can be draining and yet rewarding; also pt find that being alone tends to bring into focus the deficits in his life he doesn't like (ie being single). Pt also discussed how his work has been going, identifying that he has been feeling somewhat underutilized or unsure about how well he is doing in it. Pt processed through how he is feeling anxious over the lack of direction he has had in his new role and scope of his new job not being clearly defined. Pt was able to identify that there is no objective evidence to support his concerns and after a lot of reflection recognized the uncertainty of the situation is what really is triggering his anxiety. Pt also discussed his conundrum of his living  situation, which his room-mate deciding not to renew their lease and pt needing to find a new place. Pt has been invited to sign a lease with friends, which he wants to do, but it comes with some draw backs. Namely: a very small bedroom with limited privacy/space for his stuff, have to live with a cat (which he is allergic too), and not being able to potentially engage in self-care like singing and playing guitar (which he recent rediscovered is a big stress relief). After talking about it pt, determined that he really does want to live with his friends and thinks it would force him to be more social and save money, both of which he wants to facilitate more now. Session went longer then anticipated due to amount of topics pt needed to cover in session today.     Topic for the future: second round of dating with ex-girlfriend; normalizing LGBT through media exposure; Cognitive distortions. wanting to not use delta 8 to manage anxiety (helpful vs. Unhelpful coping skills)    Singing and playing guitar is a big stress relief    Current coping skills-  Music  Breathing technique  D8 pen (vape) - unhelpful  Eating - unhelpful  Socializing  Reading to escape (games books dnd) - unhelpful in excess   Exercising  Singing (car singing)  Staying busy at work a lot with low-priority or off tasks - unhelpful    Look up LGBT people who are surprising to pt, and bring 3 examples - openly singh [employer] member, music artists, j[employer] youtube channel: middle ground   Make list of 5 potential jobs or job fields you may want to go into - doesn't feel he has the confidence to apply for some jobs due to internalizing some feelings of past failure.      Treatment Objective(s) Addressed in This Session:   identify current thoughts, triggers, and stressors which contribute to feelings of depression  identify unhelpful or unrealistic fears / thoughts that contribute to feeling anxious  Increase interest, engagement, and pleasure in doing  things  Decrease frequency and intensity of feeling down, depressed, hopeless  Feel less tired and more energy during the day   Identify negative self-talk and behaviors: challenge core beliefs, myths, and actions  identify coping strategies for improving mood  identify two areas of life that you would like to have improved functioning     Intervention:   CBT: Discussed the internal conflict/disconnect between his heart and his brain (thoughts and feelings).   Psychodynamic: Processed internal-experiences related to being hurt in past relationships.   Solution Focused: Identified areas in which pt is self-imposing barriers and possible strategies to overcome them.    Motivational Interviewing    MI Intervention: Expressed Empathy/Understanding, Supported Autonomy, Collaboration, Evocation, Open-ended questions, Reflections: simple and complex and Reframe     Change Talk Expressed by the Patient: Desire to change Ability to change Reasons to change Committment to change Activation Taking steps    Provider Response to Change Talk: E - Evoked more info from patient about behavior change, A - Affirmed patient's thoughts, decisions, or attempts at behavior change, R - Reflected patient's change talk and S - Summarized patient's change talk statements        Assessments completed prior to visit:  PHQ2:   PHQ-2 ( 1999 [employer]) 9/2/2022 9/2/2022 7/22/2022 7/15/2022 7/1/2022 6/17/2022 3/18/2022   Q1: Little interest or pleasure in doing things 0 0 1 1 1 1 1   Q2: Feeling down, depressed or hopeless 0 0 1 1 1 1 1   PHQ-2 Score 0 0 2 2 2 2 2   Q1: Little interest or pleasure in doing things Not at all - Several days Several days Several days Several days Several days   Q2: Feeling down, depressed or hopeless Not at all - Several days Several days Several days Several days Several days   PHQ-2 Score 0 - 2 2 2 2 2     PHQ9:   PHQ-9 SCORE 7/13/2015 12/3/2021 1/24/2022 2/21/2022 9/2/2022   PHQ-9 Total Score 17 - - - -   PHQ-9 Total  Score MyChart - 6 (Mild depression) - - -   PHQ-9 Total Score - 6 8 6 1     GAD2:   MILAD-2 12/14/2021 3/18/2022 6/17/2022 6/17/2022 7/1/2022 7/15/2022 7/15/2022   Feeling nervous, anxious, or on edge 1 1 1 1 1 1 1   Not being able to stop or control worrying 1 1 1 1 1 1 1   MILAD-2 Total Score 2 2 2 2 2 2 2     GAD7:   MILAD-7 SCORE 12/3/2021 1/24/2022 2/21/2022 9/2/2022   Total Score 5 (mild anxiety) - - -   Total Score 5 7 8 1     PROMIS 10-Global Health (only subscores and total score):   PROMIS-10 Scores Only 2/21/2022 3/18/2022 6/17/2022 6/17/2022 7/1/2022 7/15/2022 7/15/2022   Global Mental Health Score 14 11 11 11 11 11 11   Global Physical Health Score 17 16 14 14 17 17 17   PROMIS TOTAL - SUBSCORES 31 27 25 25 28 28 28         ASSESSMENT: Current Emotional / Mental Status (status of significant symptoms):   Risk status (Self / Other harm or suicidal ideation)   Patient denies current fears or concerns for personal safety.   Patient denies current or recent suicidal ideation or behaviors.   Patient denies current or recent homicidal ideation or behaviors.   Patient denies current or recent self injurious behavior or ideation.   Patient denies other safety concerns.   Patient reports there has been no change in risk factors since their last session.     Patient reports there has been no change in protective factors since their last session.     Recommended that patient call 911 or go to the local ED should there be a change in any of these risk factors.     Appearance:   Appropriate    Eye Contact:   Good    Psychomotor Behavior: Normal    Attitude:   Cooperative  Interested Friendly Pleasant   Orientation:   All   Speech    Rate / Production: Normal/ Responsive Talkative    Volume:  Normal    Mood:    Anxious  Normal   Affect:    Appropriate    Thought Content:  Clear    Thought Form:  Coherent  Logical    Insight:    Good  and Intellectual Insight     Medication Review:   No changes to current psychiatric  medication(s)     Medication Compliance:   Yes     Changes in Health Issues:   None reported     Chemical Use Review:   Substance Use: Chemical use reviewed, no active concerns identified      Tobacco Use: No current tobacco use.      Diagnosis:  1. Adjustment disorder with mixed anxiety and depressed mood        Collateral Reports Completed:   Not Applicable    PLAN: (Patient Tasks / Therapist Tasks / Other)  Continue to work on engaging in self-care/relaxations activities  Ask self when feeling guilty if this is misplaced or not  Continue to wear the pride bracellet  Talk to room-mates about moving situation  Explore alternative housing options as back up  Talk with supervisor about job role and independently starting projects    Follow-up on latter:  Complete Assessment Sent through [employer]  Continue to work on seeing the trees instead of the forest   Review and complete initial interoceptives packet send through [employer]    Keaton Stein [facility]                                                       ______________________________________________________________________    Individual Treatment Plan    Patient's Name: Jamari Cota  YOB: 2000    Date of Creation: 3/4/22  Date Treatment Plan Last Reviewed/Revised: 7/15/22    DSM5 Diagnoses: Adjustment Disorders  309.28 (F43.23) With mixed anxiety and depressed mood  Psychosocial / Contextual Factors: concerns about acceptance by family, pt has started dating, financial stressors  PROMIS (reviewed every 90 days):   PROMIS 10-Global Health (only subscores and total score):   PROMIS-10 Scores Only 2/21/2022 3/18/2022 6/17/2022 6/17/2022 7/1/2022 7/15/2022 7/15/2022   Global Mental Health Score 14 11 11 11 11 11 11   Global Physical Health Score 17 16 14 14 17 17 17   PROMIS TOTAL - SUBSCORES 31 27 25 25 28 28 28       Referral / Collaboration:  Referral to another professional/service is not indicated at this time..    Anticipated number of session for  this episode of care: 8-13  Anticipation frequency of session: Biweekly  Anticipated Duration of each session: 38-52 minutes  Treatment plan will be reviewed in 90 days or when goals have been changed.       MeasurableTreatment Goal(s) related to diagnosis / functional impairment(s)  Goal 1: Patient will develop helpful coping skills, while learning to recognize unhelpful ones, and increase/expand his support system.     I will know I've met my goal when I am more able to feel more confident in myself and decisions/presetation.     Objective #A (Patient Action)    Patient will identify helpful vs unhelpful coping strategies to more effectively address stressors  exercise for 30 minutes 3 times per week for 30 minutes  Decrease frequency and intensity of feeling down, depressed, hopeless  Improve diet, appetite, mindful eating, and / or meal planning  Identify negative self-talk and behaviors: challenge core beliefs, myths, and actions  identify 5 coping strategies for improving mood  practice one mindfulness skill each day for 5-10 minutes.  Status: Continued - Date(s):7/15/22     Intervention(s)  Therapist will assign homework related to target objective with the intent to promote pt autonomy and better manage MH.  Facilitate increase in self-awareness  Provide psycho-education on mindfulness and helpful vs. unhelpful coping identification  Teach/promote utilization of coping skill development/exploration and mindfulness techniques    Objective #B Pt will learn how to develop and identify a helpful support system.  Patient will participate in social or interpersonal activities to improve mood  attend and participate in social or recreational activities to expand social network/support  Increase interest, engagement, and pleasure in doing things  Identify negative self-talk and behaviors: challenge core beliefs, myths, and actions  track and record at least 3 pleasant exchanges with friends and new people.  Status:  "Continued - Date(s): 7/15/22     Intervention(s)  Therapist will assign homework related to target objective with the intent to promote pt autonomy and better manage MH.  Facilitate increase in self-awareness  Provide psycho-education on social distortions/fallacies and boundaries  Teach/promote utilization of social engagement skills and problem solving    Goal 2: Patient will be more able to engage socially and feel less anxious when interacting in a group setting.     I will know I've met my goal when I am able to be less stressed/anxious in social interactions, especially in group settings/activities.      Objective #A (Patient Action)    Status: Continued - Date(s): 7/15/22     Patient will identify and compliment positives at least 3 times daily to support positive self-image  identify unhelpful or unrealistic thoughts/concerns and stressors which contribute to feelings of anxiety  learn and demonstrate pro-social and communication assertiveness skill(s)  initiate 3 social contact(s) per day for increasing lengths of time  practice setting boundaries 3 times in the next several weeks  accept compliment with a \"thank you\" and no self deprecating comments.    Intervention(s)  Therapist will assign homework related to target objective with the intent to promote pt autonomy and better manage MH.  Facilitate increase in self-awareness  Provide psycho-education on social anxiety thinking and communication styles  Teach/promote utilization of thought challenging and self-confidence building    Goal 3: Patient will work on processing through self-identity and how to express his authentic or genuine self.     I will know I've met my goal when I feel more comfortable being my authentic or genuine self.      Objective #A (Patient Action)  Status: Continued - Date(s): 7/15/22     Patient will identify communication strategies to more effectively address stressors  use thought-stopping strategy daily to reduce intrusive " thoughts  Identify negative self-talk and behaviors: challenge core beliefs, myths, and actions  identify how the use of substances contributes to the avoidance of feeling associated with the loss  identify the time in your life when you began to feel poorly about themselves.  Use positive self-affirmation daily to promote self-acceptance.    Intervention(s)  Therapist will assign homework related to target objective with the intent to promote pt autonomy and better manage MH.  Facilitate increase in self-awareness  Provide psycho-education on self-compassion and anticipatory grief and avoidance  Teach/promote utilization of self-care/acceptance and processing through internal-experiences    Patient has reviewed and agreed to the above plan.      Keaton Stein  [facility]  March 4, 2022

## 2022-10-07 ENCOUNTER — VIRTUAL VISIT (OUTPATIENT)
Dept: BEHAVIORAL HEALTH | Facility: HOSPITAL | Age: 22
End: 2022-10-07
Attending: PSYCHIATRY & NEUROLOGY
Payer: COMMERCIAL

## 2022-10-07 DIAGNOSIS — F43.23 ADJUSTMENT DISORDER WITH MIXED ANXIETY AND DEPRESSED MOOD: Primary | ICD-10-CM

## 2022-10-07 PROCEDURE — 90837 PSYTX W PT 60 MINUTES: CPT | Mod: 95 | Performed by: COUNSELOR

## 2022-10-07 NOTE — PROGRESS NOTES
[facility]                                      Progress Note    Patient Name: Jamari Cota  Date: 10/07/22         Service Type: Individual      Session Start Time: 1:05 pm  Session End Time: 2:02 pm     Session Length: 57 minutes    Session #: 18    Attendees: Client attended alone    Service Modality:  Video Visit:      Provider verified identity through the following two step process.  Patient provided:  Patient is known previously to provider    Telemedicine Visit: The patient's condition can be safely assessed and treated via synchronous audio and visual telemedicine encounter.      Reason for Telemedicine Visit: Services only offered telehealth    Originating Site (Patient Location): Patient's home     Distant Site (Provider Location): Provider Remote Setting- Home Office    Consent:  The patient/guardian has verbally consented to: the potential risks and benefits of telemedicine (video visit) versus in person care; bill my insurance or make self-payment for services provided; and responsibility for payment of non-covered services.     Patient would like the video invitation sent by:  My Chart    Mode of Communication:  Video Conference via [employer]    As the provider I attest to compliance with applicable laws and regulations related to telemedicine.    DATA  Interactive Complexity: No  Crisis: No  Extended Session (53+ minutes): PROLONGED SERVICE IN THE OUTPATIENT SETTING REQUIRING DIRECT (FACE-TO-FACE) PATIENT CONTACT BEYOND THE USUAL SERVICE:    - Patient's presenting concerns require more intensive intervention than could be completed within the usual service      Progress Since Last Session (Related to Symptoms / Goals / Homework):   Symptoms: Worsening symptoms, pt has been feeling more deafeted/stuck recently     Homework: Achieved / completed to satisfaction      Episode of Care Goals: Satisfactory progress - PREPARATION (Decided to change - considering how); Intervened by  negotiating a change plan and determining options / strategies for behavior change, identifying triggers, exploring social supports, and working towards setting a date to begin behavior change     Current / Ongoing Stressors and Concerns:   Pt is currently seeking therapy related to depression and anxiety around self-exploration related to his sexual identity (bi-sexual, possibly homosexual), and has some lingering shame around his sexual identity, which he is still trying to figure out. Pt grew up in a very conservative Rastafari home, where same-sex relations were not supported, which has caused him to have an internal struggle between perceived expectations and his desire to be authentic. Since last session, pt reported that he is still living on his own as his room-mate has not returned yet and he has found this to be challenging, he really doesn't like it. 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 Overall pt thinks that work has been going better and continues to be manageable. Pt reported that his ex did reach out after our last session, and requested to have a discussion to get some closure on their relationship. Initially pt wanted to avoid, and yet determined that it was something that he should do, so he went through with the conversation. Pt processed through the conversation, identifying it was likely constructive/helpful and that it grounded him back in reality as to why he ended that relationship. Upon reflection pt recognized that after this conversation he has determined that that relationship is now done and that being more objective about his relationships is something he needs to keep in mind. Pt processed through feelings of not being sure where to go next and how he is coming to see that he doesn't think he has any supports to help him take the big steps  in life he wants to. Pt is unsure how to move forward and expressed sensations of defeatism due to framing things as never being able to improve/change permanently. Pt also identified that his current room-mat has not moved back in yet and is worried that they may drop the lease because they can't afford rent. By end of session, pt verbalized that he is thinking he is in a better place overall, though still recognizes that he tends to isolate to much and get in his own head space, which tends to not be helpful. Pt was encouraged to spend more time outside of his apartment and was given praise for the pro-active attempts he has tried to make towards doing this already. Session took longer then anticipated due to the presenting concerns needing more time to process through then usual due to at least in part to pt's mind set (ie resistance/pesimism).     Topic for the future: second round of dating with ex-girlfriend; normalizing LGBT through media exposure; Cognitive distortions. wanting to not use delta 8 to manage anxiety (helpful vs. Unhelpful coping skills)    Singing and playing guitar is a big stress relief    Current coping skills-  Music  Breathing technique  D8 pen (vape) - unhelpful  Eating - unhelpful  Socializing  Reading to escape (games books dnd) - unhelpful in excess   Exercising  Singing (car singing)  Staying busy at work a lot with low-priority or off tasks - unhelpful    Look up LGBT people who are surprising to pt, and bring 3 examples - openly singh [employer] member, music artists, j[employer] youtube channel: middle ground   Make list of 5 potential jobs or job fields you may want to go into - doesn't feel he has the confidence to apply for some jobs due to internalizing some feelings of past failure.      Treatment Objective(s) Addressed in This Session:   identify current thoughts, triggers, and stressors which contribute to feelings of depression  identify unhelpful or unrealistic fears / thoughts that  contribute to feeling anxious  Increase interest, engagement, and pleasure in doing things  Decrease frequency and intensity of feeling down, depressed, hopeless  Feel less tired and more energy during the day   Identify negative self-talk and behaviors: challenge core beliefs, myths, and actions  identify coping strategies for improving mood  identify two areas of life that you would like to have improved functioning     Intervention:   CBT: Discussed how he had not moved on from his last major relationship, until now.   Psychodynamic: Processed internal-experiences related to being hurt in past relationships.   Solution Focused: Identified areas in which pt is self-imposing barriers and possible strategies to overcome them.    Motivational Interviewing    MI Intervention: Expressed Empathy/Understanding, Supported Autonomy, Collaboration, Evocation, Open-ended questions, Rolled with resistance: Emphasized patient autonomy, Simple reflection, Shifted topic to defuse resistance, Reframed sustain talk in the direction of change and Evoked patient agenda, Change talk (evoked) and Reframe     Change Talk Expressed by the Patient: Desire to change Ability to change Reasons to change Committment to change Activation Taking steps    Provider Response to Change Talk: E - Evoked more info from patient about behavior change, A - Affirmed patient's thoughts, decisions, or attempts at behavior change, R - Reflected patient's change talk and S - Summarized patient's change talk statements        Assessments completed prior to visit:  PHQ2:   PHQ-2 ( 1999 [employer]) 9/2/2022 9/2/2022 7/22/2022 7/15/2022 7/1/2022 6/17/2022 3/18/2022   Q1: Little interest or pleasure in doing things 0 0 1 1 1 1 1   Q2: Feeling down, depressed or hopeless 0 0 1 1 1 1 1   PHQ-2 Score 0 0 2 2 2 2 2   Q1: Little interest or pleasure in doing things Not at all - Several days Several days Several days Several days Several days   Q2: Feeling down, depressed  or hopeless Not at all - Several days Several days Several days Several days Several days   PHQ-2 Score 0 - 2 2 2 2 2     PHQ9:   PHQ-9 SCORE 7/13/2015 12/3/2021 1/24/2022 2/21/2022 9/2/2022   PHQ-9 Total Score 17 - - - -   PHQ-9 Total Score MyChart - 6 (Mild depression) - - -   PHQ-9 Total Score - 6 8 6 1     GAD2:   MILAD-2 12/14/2021 3/18/2022 6/17/2022 6/17/2022 7/1/2022 7/15/2022 7/15/2022   Feeling nervous, anxious, or on edge 1 1 1 1 1 1 1   Not being able to stop or control worrying 1 1 1 1 1 1 1   MLIAD-2 Total Score 2 2 2 2 2 2 2     GAD7:   MILAD-7 SCORE 12/3/2021 1/24/2022 2/21/2022 9/2/2022   Total Score 5 (mild anxiety) - - -   Total Score 5 7 8 1     PROMIS 10-Global Health (only subscores and total score):   PROMIS-10 Scores Only 2/21/2022 3/18/2022 6/17/2022 6/17/2022 7/1/2022 7/15/2022 7/15/2022   Global Mental Health Score 14 11 11 11 11 11 11   Global Physical Health Score 17 16 14 14 17 17 17   PROMIS TOTAL - SUBSCORES 31 27 25 25 28 28 28         ASSESSMENT: Current Emotional / Mental Status (status of significant symptoms):   Risk status (Self / Other harm or suicidal ideation)   Patient denies current fears or concerns for personal safety.   Patient denies current or recent suicidal ideation or behaviors.   Patient denies current or recent homicidal ideation or behaviors.   Patient denies current or recent self injurious behavior or ideation.   Patient denies other safety concerns.   Patient reports there has been no change in risk factors since their last session.     Patient reports there has been no change in protective factors since their last session.     Recommended that patient call 911 or go to the local ED should there be a change in any of these risk factors.     Appearance:   Appropriate    Eye Contact:   Fair    Psychomotor Behavior: Normal    Attitude:   Cooperative  Interested Friendly Pleasant   Orientation:   All   Speech    Rate / Production: Normal/ Responsive  Talkative    Volume:  Normal    Mood:    Anxious  Normal Sad    Affect:    Appropriate    Thought Content:  Clear    Thought Form:  Coherent  Logical    Insight:    Fair  and Intellectual Insight     Medication Review:   No changes to current psychiatric medication(s)     Medication Compliance:   Yes     Changes in Health Issues:   None reported     Chemical Use Review:   Substance Use: Chemical use reviewed, no active concerns identified      Tobacco Use: No current tobacco use.      Diagnosis:  1. Adjustment disorder with mixed anxiety and depressed mood        Collateral Reports Completed:   Not Applicable    PLAN: (Patient Tasks / Therapist Tasks / Other)  Continue to work on engaging in self-care/relaxations activities  Work on getting out of apartment everyday and not going directly home after work  Continue to wear the pride bracellet  Explore alternative housing options in case room-mate drops lease    Follow-up on latter:  Complete Assessment Sent through [employer]  Continue to work on seeing the trees instead of the forest   Review and complete initial interoceptives packet send through [employer]    Keaton Stein [facility]                                                       ______________________________________________________________________    Individual Treatment Plan    Patient's Name: Jamari Cota  YOB: 2000    Date of Creation: 3/4/22  Date Treatment Plan Last Reviewed/Revised: 7/15/22    DSM5 Diagnoses: Adjustment Disorders  309.28 (F43.23) With mixed anxiety and depressed mood  Psychosocial / Contextual Factors: concerns about acceptance by family, pt has started dating, financial stressors  PROMIS (reviewed every 90 days):   PROMIS 10-Global Health (only subscores and total score):   PROMIS-10 Scores Only 2/21/2022 3/18/2022 6/17/2022 6/17/2022 7/1/2022 7/15/2022 7/15/2022   Global Mental Health Score 14 11 11 11 11 11 11   Global Physical Health Score 17 16 14 14 17 17 17   PROMIS  TOTAL - SUBSCORES 31 27 25 25 28 28 28       Referral / Collaboration:  Referral to another professional/service is not indicated at this time..    Anticipated number of session for this episode of care: 8-13  Anticipation frequency of session: Biweekly  Anticipated Duration of each session: 38-52 minutes  Treatment plan will be reviewed in 90 days or when goals have been changed.       MeasurableTreatment Goal(s) related to diagnosis / functional impairment(s)  Goal 1: Patient will develop helpful coping skills, while learning to recognize unhelpful ones, and increase/expand his support system.     I will know I've met my goal when I am more able to feel more confident in myself and decisions/presetation.     Objective #A (Patient Action)    Patient will identify helpful vs unhelpful coping strategies to more effectively address stressors  exercise for 30 minutes 3 times per week for 30 minutes  Decrease frequency and intensity of feeling down, depressed, hopeless  Improve diet, appetite, mindful eating, and / or meal planning  Identify negative self-talk and behaviors: challenge core beliefs, myths, and actions  identify 5 coping strategies for improving mood  practice one mindfulness skill each day for 5-10 minutes.  Status: Continued - Date(s):7/15/22     Intervention(s)  Therapist will assign homework related to target objective with the intent to promote pt autonomy and better manage MH.  Facilitate increase in self-awareness  Provide psycho-education on mindfulness and helpful vs. unhelpful coping identification  Teach/promote utilization of coping skill development/exploration and mindfulness techniques    Objective #B Pt will learn how to develop and identify a helpful support system.  Patient will participate in social or interpersonal activities to improve mood  attend and participate in social or recreational activities to expand social network/support  Increase interest, engagement, and pleasure in  "doing things  Identify negative self-talk and behaviors: challenge core beliefs, myths, and actions  track and record at least 3 pleasant exchanges with friends and new people.  Status: Continued - Date(s): 7/15/22     Intervention(s)  Therapist will assign homework related to target objective with the intent to promote pt autonomy and better manage MH.  Facilitate increase in self-awareness  Provide psycho-education on social distortions/fallacies and boundaries  Teach/promote utilization of social engagement skills and problem solving    Goal 2: Patient will be more able to engage socially and feel less anxious when interacting in a group setting.     I will know I've met my goal when I am able to be less stressed/anxious in social interactions, especially in group settings/activities.      Objective #A (Patient Action)    Status: Continued - Date(s): 7/15/22     Patient will identify and compliment positives at least 3 times daily to support positive self-image  identify unhelpful or unrealistic thoughts/concerns and stressors which contribute to feelings of anxiety  learn and demonstrate pro-social and communication assertiveness skill(s)  initiate 3 social contact(s) per day for increasing lengths of time  practice setting boundaries 3 times in the next several weeks  accept compliment with a \"thank you\" and no self deprecating comments.    Intervention(s)  Therapist will assign homework related to target objective with the intent to promote pt autonomy and better manage MH.  Facilitate increase in self-awareness  Provide psycho-education on social anxiety thinking and communication styles  Teach/promote utilization of thought challenging and self-confidence building    Goal 3: Patient will work on processing through self-identity and how to express his authentic or genuine self.     I will know I've met my goal when I feel more comfortable being my authentic or genuine self.      Objective #A (Patient " Action)  Status: Continued - Date(s): 7/15/22     Patient will identify communication strategies to more effectively address stressors  use thought-stopping strategy daily to reduce intrusive thoughts  Identify negative self-talk and behaviors: challenge core beliefs, myths, and actions  identify how the use of substances contributes to the avoidance of feeling associated with the loss  identify the time in your life when you began to feel poorly about themselves.  Use positive self-affirmation daily to promote self-acceptance.    Intervention(s)  Therapist will assign homework related to target objective with the intent to promote pt autonomy and better manage MH.  Facilitate increase in self-awareness  Provide psycho-education on self-compassion and anticipatory grief and avoidance  Teach/promote utilization of self-care/acceptance and processing through internal-experiences    Patient has reviewed and agreed to the above plan.      Keaton Stein  [facility]  March 4, 2022

## 2022-10-22 ENCOUNTER — HEALTH MAINTENANCE LETTER (OUTPATIENT)
Age: 22
End: 2022-10-22

## 2022-12-16 ENCOUNTER — NURSE TRIAGE (OUTPATIENT)
Dept: INTERNAL MEDICINE | Facility: CLINIC | Age: 22
End: 2022-12-16

## 2022-12-16 NOTE — TELEPHONE ENCOUNTER
Called and spoke with patient.     He will start with 20 mg prednisone and see if that is better tolerated.     CIERRA ANTUNEZ RN on 12/16/2022 at 12:10 PM   [facility]

## 2022-12-16 NOTE — TELEPHONE ENCOUNTER
He could start with the 20 mg dose and see if this causes anxiety. Alternatively, we could send an Rx for indomethacin (non-steroidal antiinflammatory) to help relieve symptoms/pain.    Jenaro Maldonado DO  12/16/2022 11:27 AM

## 2022-12-16 NOTE — TELEPHONE ENCOUNTER
Nurse Triage SBAR    Is this a 2nd Level Triage? YES, LICENSED PRACTITIONER REVIEW IS REQUIRED    Situation: Patient calls on day two of prednisone taper for suspected gout. Patient experiencing anxiety, sweating, and dizziness following 30 mg prednisone dose.    Background: Patient was seen for severe right foot pain at an [facility] in the last three days.  Healthy hx: fatty liver disease    Assessment: Patient calls with concerns following [facility] visit. Patient was traveling for work. Patient experienced extreme pain in right middle toe. Patient was diagnosed with gout and given a prednisone taper. Patient began to feel very anxious with sweating and dizziness after taking 30 mg of prednisone. Patient was to take 30 mg for three days, 20 mg for three days, and 10 mg for three days. Patient was supposed to hear from  provider after lab work, but has not received a call.    Patient reports mild relief of pain in right foot after starting prednisone. Patient denies any redness, swelling, or warm to touch skin. Patient is able to walk on foot.     Home care advice given. Gout symptoms discuss with patient.     Patient is wondering if dose adjustment can be made so that they don't experience anxiety. Patient was calm by end of conversation.    No pcp listed. Routing to last seen provider- LOV 09/02/2022     Protocol Recommended Disposition:   See in Office Within 3 Days    Recommendation: Please advise on prednisone adjustment     Routed to provider    Does the patient meet one of the following criteria for ADS visit consideration? No  Reason for Disposition    Patient wants to be seen    Additional Information    Negative: Followed an ankle or foot injury    Negative: Toe pain is main symptom    Negative: Ankle pain is main symptom    Negative: Entire foot is cool or blue in comparison to other foot    Negative: Purple or black skin on foot or toe    Negative: Red area or streak and fever    Negative: Swollen  "foot and fever    Negative: Patient sounds very sick or weak to the triager    Negative: SEVERE pain (e.g., excruciating, unable to do any normal activities)    Negative: Looks like a boil, infected sore, deep ulcer, or other infected rash (spreading redness, pus)    Negative: Swollen foot  (Exceptions: Localized bump from bunions, calluses, insect bite, sting.)    Negative: Weakness (i.e., loss of strength) of new-onset in foot or toes (Exceptions: Not truly weak, foot feels weak because of pain; weakness present > 2 weeks.)    Negative: Numbness (i.e., loss of sensation) in foot or toes (Exception: Just tingling; numbness present > 2 weeks.)    Negative: Pain in the big toe joint    Negative: Tingling in feet and new or increased    Negative: Weakness or numbness in foot or toes and present > 2 weeks    Negative: MODERATE pain (e.g., interferes with normal activities, limping) and present > 3 days    Answer Assessment - Initial Assessment Questions  1. ONSET: \"When did the pain start?\"       Over the past week  2. LOCATION: \"Where is the pain located?\"       Right foot, specifically the right toe  3. PAIN: \"How bad is the pain?\"    (Scale 1-10; or mild, moderate, severe)   - MILD (1-3): doesn't interfere with normal activities.    - MODERATE (4-7): interferes with normal activities (e.g., work or school) or awakens from sleep, limping.    - SEVERE (8-10): excruciating pain, unable to do any normal activities, unable to walk.       Moderate to severe  4. WORK OR EXERCISE: \"Has there been any recent work or exercise that involved this part of the body?\"       no  5. CAUSE: \"What do you think is causing the foot pain?\"      Diagnosed with gout in [facility]  6. OTHER SYMPTOMS: \"Do you have any other symptoms?\" (e.g., leg pain, rash, fever, numbness)      No  7. PREGNANCY: \"Is there any chance you are pregnant?\" \"When was your last menstrual period?\"      No    Protocols used: FOOT PAIN-A-[place]      "

## 2023-01-23 DIAGNOSIS — K75.81 NASH (NONALCOHOLIC STEATOHEPATITIS): Primary | ICD-10-CM

## 2023-03-30 ENCOUNTER — OFFICE VISIT (OUTPATIENT)
Dept: FAMILY MEDICINE | Facility: CLINIC | Age: 23
End: 2023-03-30
Payer: COMMERCIAL

## 2023-03-30 VITALS
DIASTOLIC BLOOD PRESSURE: 82 MMHG | WEIGHT: 219 LBS | RESPIRATION RATE: 16 BRPM | BODY MASS INDEX: 29.03 KG/M2 | HEIGHT: 73 IN | OXYGEN SATURATION: 100 % | SYSTOLIC BLOOD PRESSURE: 129 MMHG | HEART RATE: 76 BPM

## 2023-03-30 DIAGNOSIS — E78.2 MIXED HYPERLIPIDEMIA: ICD-10-CM

## 2023-03-30 DIAGNOSIS — Z00.00 ROUTINE GENERAL MEDICAL EXAMINATION AT A HEALTH CARE FACILITY: Primary | ICD-10-CM

## 2023-03-30 DIAGNOSIS — K75.81 NASH (NONALCOHOLIC STEATOHEPATITIS): ICD-10-CM

## 2023-03-30 LAB
ALBUMIN SERPL BCG-MCNC: 4.7 G/DL (ref 3.5–5.2)
ALP SERPL-CCNC: 52 U/L (ref 40–129)
ALT SERPL W P-5'-P-CCNC: 22 U/L (ref 10–50)
ANION GAP SERPL CALCULATED.3IONS-SCNC: 14 MMOL/L (ref 7–15)
AST SERPL W P-5'-P-CCNC: 27 U/L (ref 10–50)
BILIRUB DIRECT SERPL-MCNC: 0.22 MG/DL (ref 0–0.3)
BILIRUB SERPL-MCNC: 1.4 MG/DL
BUN SERPL-MCNC: 12.7 MG/DL (ref 6–20)
CALCIUM SERPL-MCNC: 9.5 MG/DL (ref 8.6–10)
CHLORIDE SERPL-SCNC: 107 MMOL/L (ref 98–107)
CHOLEST SERPL-MCNC: 181 MG/DL
CREAT SERPL-MCNC: 0.99 MG/DL (ref 0.67–1.17)
DEPRECATED HCO3 PLAS-SCNC: 23 MMOL/L (ref 22–29)
ERYTHROCYTE [DISTWIDTH] IN BLOOD BY AUTOMATED COUNT: 13.4 % (ref 10–15)
GFR SERPL CREATININE-BSD FRML MDRD: >90 ML/MIN/1.73M2
GLUCOSE SERPL-MCNC: 97 MG/DL (ref 70–99)
HCT VFR BLD AUTO: 47.6 % (ref 40–53)
HDLC SERPL-MCNC: 37 MG/DL
HGB BLD-MCNC: 15.5 G/DL (ref 13.3–17.7)
INR PPP: 1.11 (ref 0.85–1.15)
LDLC SERPL CALC-MCNC: 120 MG/DL
MCH RBC QN AUTO: 28.4 PG (ref 26.5–33)
MCHC RBC AUTO-ENTMCNC: 32.6 G/DL (ref 31.5–36.5)
MCV RBC AUTO: 87 FL (ref 78–100)
NONHDLC SERPL-MCNC: 144 MG/DL
PLATELET # BLD AUTO: 257 10E3/UL (ref 150–450)
POTASSIUM SERPL-SCNC: 4.3 MMOL/L (ref 3.4–5.3)
PROT SERPL-MCNC: 7.5 G/DL (ref 6.4–8.3)
RBC # BLD AUTO: 5.45 10E6/UL (ref 4.4–5.9)
SODIUM SERPL-SCNC: 144 MMOL/L (ref 136–145)
TRIGL SERPL-MCNC: 119 MG/DL
WBC # BLD AUTO: 6.9 10E3/UL (ref 4–11)

## 2023-03-30 PROCEDURE — 91312 COVID-19 VACCINE BIVALENT BOOSTER 12+ (PFIZER): CPT | Performed by: NURSE PRACTITIONER

## 2023-03-30 PROCEDURE — 82248 BILIRUBIN DIRECT: CPT | Performed by: NURSE PRACTITIONER

## 2023-03-30 PROCEDURE — 85610 PROTHROMBIN TIME: CPT | Performed by: NURSE PRACTITIONER

## 2023-03-30 PROCEDURE — 99395 PREV VISIT EST AGE 18-39: CPT | Mod: 25 | Performed by: NURSE PRACTITIONER

## 2023-03-30 PROCEDURE — 90471 IMMUNIZATION ADMIN: CPT | Performed by: NURSE PRACTITIONER

## 2023-03-30 PROCEDURE — 80053 COMPREHEN METABOLIC PANEL: CPT | Performed by: NURSE PRACTITIONER

## 2023-03-30 PROCEDURE — 90686 IIV4 VACC NO PRSV 0.5 ML IM: CPT | Performed by: NURSE PRACTITIONER

## 2023-03-30 PROCEDURE — 0124A COVID-19 VACCINE BIVALENT BOOSTER 12+ (PFIZER): CPT | Performed by: NURSE PRACTITIONER

## 2023-03-30 PROCEDURE — 36415 COLL VENOUS BLD VENIPUNCTURE: CPT | Performed by: NURSE PRACTITIONER

## 2023-03-30 PROCEDURE — 80061 LIPID PANEL: CPT | Performed by: NURSE PRACTITIONER

## 2023-03-30 PROCEDURE — 85027 COMPLETE CBC AUTOMATED: CPT | Performed by: NURSE PRACTITIONER

## 2023-03-30 SDOH — ECONOMIC STABILITY: TRANSPORTATION INSECURITY
IN THE PAST 12 MONTHS, HAS LACK OF TRANSPORTATION KEPT YOU FROM MEETINGS, WORK, OR FROM GETTING THINGS NEEDED FOR DAILY LIVING?: NO

## 2023-03-30 SDOH — ECONOMIC STABILITY: INCOME INSECURITY: IN THE LAST 12 MONTHS, WAS THERE A TIME WHEN YOU WERE NOT ABLE TO PAY THE MORTGAGE OR RENT ON TIME?: NO

## 2023-03-30 SDOH — ECONOMIC STABILITY: TRANSPORTATION INSECURITY
IN THE PAST 12 MONTHS, HAS THE LACK OF TRANSPORTATION KEPT YOU FROM MEDICAL APPOINTMENTS OR FROM GETTING MEDICATIONS?: NO

## 2023-03-30 SDOH — ECONOMIC STABILITY: INCOME INSECURITY: HOW HARD IS IT FOR YOU TO PAY FOR THE VERY BASICS LIKE FOOD, HOUSING, MEDICAL CARE, AND HEATING?: NOT VERY HARD

## 2023-03-30 SDOH — HEALTH STABILITY: PHYSICAL HEALTH: ON AVERAGE, HOW MANY DAYS PER WEEK DO YOU ENGAGE IN MODERATE TO STRENUOUS EXERCISE (LIKE A BRISK WALK)?: 4 DAYS

## 2023-03-30 SDOH — ECONOMIC STABILITY: FOOD INSECURITY: WITHIN THE PAST 12 MONTHS, THE FOOD YOU BOUGHT JUST DIDN'T LAST AND YOU DIDN'T HAVE MONEY TO GET MORE.: NEVER TRUE

## 2023-03-30 SDOH — ECONOMIC STABILITY: FOOD INSECURITY: WITHIN THE PAST 12 MONTHS, YOU WORRIED THAT YOUR FOOD WOULD RUN OUT BEFORE YOU GOT MONEY TO BUY MORE.: NEVER TRUE

## 2023-03-30 SDOH — HEALTH STABILITY: PHYSICAL HEALTH: ON AVERAGE, HOW MANY MINUTES DO YOU ENGAGE IN EXERCISE AT THIS LEVEL?: 40 MIN

## 2023-03-30 ASSESSMENT — ENCOUNTER SYMPTOMS
EYE PAIN: 0
DIZZINESS: 0
PARESTHESIAS: 0
DIARRHEA: 0
ABDOMINAL PAIN: 0
WEAKNESS: 0
CHILLS: 0
MYALGIAS: 0
HEMATURIA: 0
HEARTBURN: 0
FEVER: 0
JOINT SWELLING: 0
SORE THROAT: 0
PALPITATIONS: 0
CONSTIPATION: 0
FREQUENCY: 0
HEMATOCHEZIA: 0
DYSURIA: 0
HEADACHES: 0
NERVOUS/ANXIOUS: 1
ARTHRALGIAS: 0
SHORTNESS OF BREATH: 0
NAUSEA: 0
COUGH: 0

## 2023-03-30 ASSESSMENT — SOCIAL DETERMINANTS OF HEALTH (SDOH)
DO YOU BELONG TO ANY CLUBS OR ORGANIZATIONS SUCH AS CHURCH GROUPS UNIONS, FRATERNAL OR ATHLETIC GROUPS, OR SCHOOL GROUPS?: NO
IN A TYPICAL WEEK, HOW MANY TIMES DO YOU TALK ON THE PHONE WITH FAMILY, FRIENDS, OR NEIGHBORS?: ONCE A WEEK
HOW OFTEN DO YOU GET TOGETHER WITH FRIENDS OR RELATIVES?: TWICE A WEEK
HOW OFTEN DO YOU ATTEND CHURCH OR RELIGIOUS SERVICES?: NEVER
ARE YOU MARRIED, WIDOWED, DIVORCED, SEPARATED, NEVER MARRIED, OR LIVING WITH A PARTNER?: NEVER MARRIED

## 2023-03-30 ASSESSMENT — LIFESTYLE VARIABLES
SKIP TO QUESTIONS 9-10: 1
HOW MANY STANDARD DRINKS CONTAINING ALCOHOL DO YOU HAVE ON A TYPICAL DAY: PATIENT DOES NOT DRINK
AUDIT-C TOTAL SCORE: 0
HOW OFTEN DO YOU HAVE SIX OR MORE DRINKS ON ONE OCCASION: NEVER
HOW OFTEN DO YOU HAVE A DRINK CONTAINING ALCOHOL: NEVER

## 2023-03-30 NOTE — PROGRESS NOTES
SUBJECTIVE:   CC: Jamari is an 23 year old who presents for preventative health visit.     Patient has been advised of split billing requirements and indicates understanding: Yes     Healthy Habits:     Getting at least 3 servings of Calcium per day:  Yes    Bi-annual eye exam:  Yes    Dental care twice a year:  Yes    Sleep apnea or symptoms of sleep apnea:  Daytime drowsiness    Diet:  Low fat/cholesterol and Other    Frequency of exercise:  4-5 days/week    Duration of exercise:  30-45 minutes    Taking medications regularly:  Yes    Medication side effects:  None    PHQ-2 Total Score: 2    Additional concerns today:  No    Working with Gastroenterology for HOANG. Has had 50+# weight loss for treatment.     Mood feels well controlled, overall. Weaning visits with Psychology.     No new sexual partners.       Today's PHQ-2 Score:   PHQ-2 ( 1999 [employer]) 3/30/2023   Q1: Little interest or pleasure in doing things 1   Q2: Feeling down, depressed or hopeless 1   PHQ-2 Score 2   Q1: Little interest or pleasure in doing things Several days   Q2: Feeling down, depressed or hopeless Several days   PHQ-2 Score 2   Some encounter information is confidential and restricted. Go to Review Flowsheets activity to see all data.       Social History     Tobacco Use     Smoking status: Never     Smokeless tobacco: Never     Tobacco comments:     non smoking home, mother and step father smoke   Substance Use Topics     Alcohol use: No     Alcohol/week: 0.0 standard drinks       Alcohol Use 3/30/2023   Prescreen: >3 drinks/day or >7 drinks/week? Not Applicable       Last PSA: No results found for: PSA    Reviewed orders with patient. Reviewed health maintenance and updated orders accordingly - Yes  Lab work is in process  Labs reviewed in EPIC    Reviewed and updated as needed this visit by clinical staff   Tobacco  Allergies  Meds  Problems  Med Hx  Surg Hx  Fam Hx        LUPILLO Marcial CNP on 3/30/2023 at 7:29  "AM    Reviewed and updated as needed this visit by Provider   Tobacco  Allergies  Meds  Problems  Med Hx  Surg Hx  Fam Hx         Past Medical History:   Diagnosis Date     Fatty liver      Otitis media with effusion     chronic     Varicella     about age 3      Past Surgical History:   Procedure Laterality Date     PE TUBES       SURGICAL HISTORY OF -       arm surgery       Review of Systems   Constitutional: Negative for chills and fever.   HENT: Negative for congestion, ear pain, hearing loss and sore throat.    Eyes: Negative for pain and visual disturbance.   Respiratory: Negative for cough and shortness of breath.    Cardiovascular: Negative for chest pain, palpitations and peripheral edema.   Gastrointestinal: Negative for abdominal pain, constipation, diarrhea, heartburn, hematochezia and nausea.   Genitourinary: Negative for dysuria, frequency, genital sores, hematuria, impotence, penile discharge and urgency.   Musculoskeletal: Negative for arthralgias, joint swelling and myalgias.   Skin: Negative for rash.   Neurological: Negative for dizziness, weakness, headaches and paresthesias.   Psychiatric/Behavioral: Negative for mood changes. The patient is nervous/anxious.          OBJECTIVE:   /82 (BP Location: Right arm, Patient Position: Sitting, Cuff Size: Adult Regular)   Pulse 76   Resp 16   Ht 1.848 m (6' 0.75\")   Wt 99.3 kg (219 lb)   SpO2 100%   BMI 29.09 kg/m      Physical Exam  GENERAL: healthy, alert and no distress  EYES: Eyes grossly normal to inspection, PERRL and conjunctivae and sclerae normal  HENT: ear canals and TM's normal, nose and mouth without ulcers or lesions  NECK: no adenopathy, no asymmetry, masses, or scars and thyroid normal to palpation  RESP: lungs clear to auscultation - no rales, rhonchi or wheezes  CV: regular rate and rhythm, normal S1 S2, no S3 or S4, no murmur, click or rub, no peripheral edema and peripheral pulses strong  ABDOMEN: soft, nontender, no " "hepatosplenomegaly, no masses and bowel sounds normal  MS: no gross musculoskeletal defects noted, no edema  SKIN: no suspicious lesions or rashes  NEURO: Normal strength and tone, mentation intact and speech normal  PSYCH: mentation appears normal, affect normal/bright    Diagnostic Test Results:  Labs reviewed in Epic    ASSESSMENT/PLAN:   Jamari was seen today for physical.    Diagnoses and all orders for this visit:    Routine general medical examination at a health care facility  HM updated.     Mixed hyperlipidemia  -     Lipid panel reflex to direct LDL Fasting; Future  -     Lipid panel reflex to direct LDL Fasting  Update lipids. Has been able to stop statin per Gastroenterology.     HOANG (nonalcoholic steatohepatitis)  -     Basic metabolic panel  -     CBC with platelets  -     Hepatic function panel  -     INR  Follows with Gastroenterology. Good control with weight loss.     Other orders  -     INFLUENZA VACCINE IM > 6 MONTHS VALENT IIV4 (AFLURIA/FLUZONE)  -     PRIMARY CARE FOLLOW-UP SCHEDULING; Future              COUNSELING:   Reviewed preventive health counseling, as reflected in patient instructions      BMI:   Estimated body mass index is 29.09 kg/m  as calculated from the following:    Height as of this encounter: 1.848 m (6' 0.75\").    Weight as of this encounter: 99.3 kg (219 lb).   Weight management plan: Discussed healthy diet and exercise guidelines      He reports that he has never smoked. He has never used smokeless tobacco.        LUPILLO Marcial CNP  [facility]"

## 2023-04-17 ENCOUNTER — OFFICE VISIT (OUTPATIENT)
Dept: GASTROENTEROLOGY | Facility: CLINIC | Age: 23
End: 2023-04-17
Payer: COMMERCIAL

## 2023-04-17 VITALS
HEART RATE: 76 BPM | OXYGEN SATURATION: 99 % | DIASTOLIC BLOOD PRESSURE: 84 MMHG | WEIGHT: 221 LBS | BODY MASS INDEX: 29.93 KG/M2 | HEIGHT: 72 IN | SYSTOLIC BLOOD PRESSURE: 132 MMHG

## 2023-04-17 DIAGNOSIS — K75.81 NASH (NONALCOHOLIC STEATOHEPATITIS): Primary | ICD-10-CM

## 2023-04-17 PROCEDURE — 99214 OFFICE O/P EST MOD 30 MIN: CPT | Performed by: INTERNAL MEDICINE

## 2023-04-17 ASSESSMENT — PAIN SCALES - GENERAL: PAINLEVEL: NO PAIN (0)

## 2023-04-17 NOTE — LETTER
4/17/2023         RE: Jamari Cota  [address]  [place] [address]        Dear Colleague,    Thank you for referring your patient, Jamari Cota, to the [facility] [place]. Please see a copy of my visit note below.    [facility] Hepatology    Follow-up Visit    Referring provider:               Twyla Sterling     Chief complaint and reason for the visit: Abnormal liver function tests.    Subjective:  23 year old male with fatty infiltration of the liver on imaging and whom we initially saw for abnormal liver function tests.  He does have hyperlipidemia and obesity and as I have said in my previous dictation he was on Crestor.  He did discontinue that since.  Except for the initial weight loss of around 50 pounds he did not lose after that any significant weight.    Mr. Alegre does not show any signs of chronic liver disease and denies jaundice, lower extremity edema, abdominal distension, lethargy or confusion. Patient denies melena, hematemesis or hematochezia.  His liver function tests have quasi normalized.  Now he tells me that he does not have any abdominal pain, nausea or vomiting.  He is moving his bowels 4 times a day with no blood in it.  And he is only medication is hydroxyzine for occasional itching.    Patient denies fevers, sweats or chills.  He has done the vaccination for the COVID with the Pfizer brand and has done so far for doses.      Medical hx Surgical hx   Past Medical History:   Diagnosis Date     Fatty liver      Otitis media with effusion     chronic     Varicella     about age 3      Past Surgical History:   Procedure Laterality Date     PE TUBES       SURGICAL HISTORY OF -       arm surgery          Medications  Prior to Admission medications    Medication Sig Start Date End Date Taking? Authorizing Provider   hydrOXYzine (ATARAX) 25 MG tablet Take 1 tablet (25 mg) by mouth 3 times daily as needed for itching 6/21/21   Toby Beaulieu  MD [place]       Allergies  Allergies   Allergen Reactions     Codeine      Hyper       Review of systems  A 10-point review of systems was negative    Examination  /84 (BP Location: Left arm, Patient Position: Sitting, Cuff Size: Adult Large)   Pulse 76   Ht 1.829 m (6')   Wt 100.2 kg (221 lb)   SpO2 99%   BMI 29.97 kg/m    Body mass index is 29.97 kg/m .    Gen- well, NAD, A+Ox3, normal color  Lym- no palpable LAD  CVS- RRR  RS- CTA  Abd-is obese.  Extr- hands normal, no MATTHEW  Skin- no rash or jaundice  Neuro- no asterixis  Psych- normal mood    Laboratory  Lab Results   Component Value Date     03/30/2023     02/13/2008    POTASSIUM 4.3 03/30/2023    POTASSIUM 3.9 08/15/2022    POTASSIUM 3.6 02/13/2008    CHLORIDE 107 03/30/2023    CHLORIDE 106 08/15/2022    CHLORIDE 101 02/13/2008    CO2 23 03/30/2023    CO2 24 08/15/2022    CO2 28 02/13/2008    BUN 12.7 03/30/2023    BUN 13 08/15/2022    BUN 7 02/13/2008    CR 0.99 03/30/2023    CR 0.50 02/13/2008       Lab Results   Component Value Date    BILITOTAL 1.4 03/30/2023    ALT 22 03/30/2023    AST 27 03/30/2023    ALKPHOS 52 03/30/2023       Lab Results   Component Value Date    ALBUMIN 4.7 03/30/2023    ALBUMIN 4.3 08/15/2022    PROTTOTAL 7.5 03/30/2023        Lab Results   Component Value Date    WBC 6.9 03/30/2023    WBC 15.6 02/13/2008    HGB 15.5 03/30/2023    HGB 12.3 02/13/2008    MCV 87 03/30/2023    MCV 81 02/13/2008     03/30/2023     02/13/2008       Lab Results   Component Value Date    INR 1.11 03/30/2023       Radiology    Assessment  23 year old male with risk factors for nonalcoholic steatohepatitis including hyperlipidemia, and obesity.  He was previously on Crestor which he discontinued.  He is supposed to see the cardiologist sometime next month.  As far as his liver function tests are concerned he has made significant progress and his liver function tests were quasi normal.  We congratulated him for  that.    Plan  He will follow-up with our group in 6 months and we will see if further changes need to be made.  His BMI is in the overweight range and he will need to continue losing weight.  He does not drink any alcoholic beverages and he will continue to abstain.    For the Crestor treatment for his hyperlipidemia we will talk with the cardiologist.    Will need to follow-up with his PCP regarding his other healthcare maintenance issues.    I spent 30 minutes on this encounter of April 17, 2023 in chart reviewing, history taking, physical examination and documentation.  I spent some of the time in coordination of care and counseling.    Carlota Heard MD  Hepatology  [facility]      Again, thank you for allowing me to participate in the care of your patient.        Sincerely,        Carlota Heard MD

## 2023-04-17 NOTE — PROGRESS NOTES
[facility] Hepatology    Follow-up Visit    Referring provider:               Twyla Sterling     Chief complaint and reason for the visit: Abnormal liver function tests.    Subjective:  23 year old male with fatty infiltration of the liver on imaging and whom we initially saw for abnormal liver function tests.  He does have hyperlipidemia and obesity and as I have said in my previous dictation he was on Crestor.  He did discontinue that since.  Except for the initial weight loss of around 50 pounds he did not lose after that any significant weight.    Mr. Alegre does not show any signs of chronic liver disease and denies jaundice, lower extremity edema, abdominal distension, lethargy or confusion. Patient denies melena, hematemesis or hematochezia.  His liver function tests have quasi normalized.  Now he tells me that he does not have any abdominal pain, nausea or vomiting.  He is moving his bowels 4 times a day with no blood in it.  And he is only medication is hydroxyzine for occasional itching.    Patient denies fevers, sweats or chills.  He has done the vaccination for the COVID with the Pfizer brand and has done so far for doses.      Medical hx Surgical hx   Past Medical History:   Diagnosis Date     Fatty liver      Otitis media with effusion     chronic     Varicella     about age 3      Past Surgical History:   Procedure Laterality Date     PE TUBES       SURGICAL HISTORY OF -       arm surgery          Medications  Prior to Admission medications    Medication Sig Start Date End Date Taking? Authorizing Provider   hydrOXYzine (ATARAX) 25 MG tablet Take 1 tablet (25 mg) by mouth 3 times daily as needed for itching 6/21/21   Toby Beaulieu MD       Allergies  Allergies   Allergen Reactions     Codeine      Hyper       Review of systems  A 10-point review of systems was negative    Examination  /84 (BP Location: Left arm, Patient Position: Sitting, Cuff Size: Adult Large)   Pulse 76    Ht 1.829 m (6')   Wt 100.2 kg (221 lb)   SpO2 99%   BMI 29.97 kg/m    Body mass index is 29.97 kg/m .    Gen- well, NAD, A+Ox3, normal color  Lym- no palpable LAD  CVS- RRR  RS- CTA  Abd-is obese.  Extr- hands normal, no MATTHEW  Skin- no rash or jaundice  Neuro- no asterixis  Psych- normal mood    Laboratory  Lab Results   Component Value Date     03/30/2023     02/13/2008    POTASSIUM 4.3 03/30/2023    POTASSIUM 3.9 08/15/2022    POTASSIUM 3.6 02/13/2008    CHLORIDE 107 03/30/2023    CHLORIDE 106 08/15/2022    CHLORIDE 101 02/13/2008    CO2 23 03/30/2023    CO2 24 08/15/2022    CO2 28 02/13/2008    BUN 12.7 03/30/2023    BUN 13 08/15/2022    BUN 7 02/13/2008    CR 0.99 03/30/2023    CR 0.50 02/13/2008       Lab Results   Component Value Date    BILITOTAL 1.4 03/30/2023    ALT 22 03/30/2023    AST 27 03/30/2023    ALKPHOS 52 03/30/2023       Lab Results   Component Value Date    ALBUMIN 4.7 03/30/2023    ALBUMIN 4.3 08/15/2022    PROTTOTAL 7.5 03/30/2023        Lab Results   Component Value Date    WBC 6.9 03/30/2023    WBC 15.6 02/13/2008    HGB 15.5 03/30/2023    HGB 12.3 02/13/2008    MCV 87 03/30/2023    MCV 81 02/13/2008     03/30/2023     02/13/2008       Lab Results   Component Value Date    INR 1.11 03/30/2023       Radiology    Assessment  23 year old male with risk factors for nonalcoholic steatohepatitis including hyperlipidemia, and obesity.  He was previously on Crestor which he discontinued.  He is supposed to see the cardiologist sometime next month.  As far as his liver function tests are concerned he has made significant progress and his liver function tests were quasi normal.  We congratulated him for that.    Plan  He will follow-up with our group in 6 months and we will see if further changes need to be made.  His BMI is in the overweight range and he will need to continue losing weight.  He does not drink any alcoholic beverages and he will continue to abstain.    For  the Crestor treatment for his hyperlipidemia we will talk with the cardiologist.    Will need to follow-up with his PCP regarding his other healthcare maintenance issues.    I spent 30 minutes on this encounter of April 17, 2023 in chart reviewing, history taking, physical examination and documentation.  I spent some of the time in coordination of care and counseling.    Carlota Heard MD  Hepatology  [facility]

## 2023-04-17 NOTE — NURSING NOTE
Chief Complaint   Patient presents with     Follow Up     HOANG (nonalcoholic steatohepatitis)       Vitals:    04/17/23 0916   BP: 132/84   BP Location: Left arm   Patient Position: Sitting   Cuff Size: Adult Large   Pulse: 76   SpO2: 99%   Weight: 100.2 kg (221 lb)   Height: 1.829 m (6')       Body mass index is 29.97 kg/m .    Misti Mars, [facility]

## 2023-05-01 NOTE — PROGRESS NOTES
PREVENTIVE CARDIOLOGY Follow up  Jamari Cota is a 23 year old male who was referred to Cardiology clinic by Dr. Heard in gastroenterology for evaluation management of hyperlipidemia in the setting of nonalcoholic steatohepatitis.    2/4/22  Jamari is a pleasant young man with no history of clinical atherosclerotic CVD.  Other than elevated cholesterol he had some elevated blood pressures in the past, but more recently they have been very well controlled.  He does not smoke and his fasting glucose is normal.  No family history of atherosclerotic CVD.    We was referred to the GI clinic after he was found to have elevated transaminases.  Subsequent CT showed fatty liver disease.  He made major changes to his diet and started exercising.     5/6/22  Jamari is feeling well. He continues to eat health and exercise regularly.   He goes to the gym 3-4 times/week and does 30-40 minutes weights then 10 minute sprint. No chest pain or dyspnea.   He continues to lose weight, about 10 pounds over the past 3 months.     5/1/23 Interval History  Jamari is doing very well. He is exercising 5 days per week. No resting or exertional chest pain or dyspnea.   He has been able to maintain his weight, but would like to lose another 10-20 pounds. He continues to eat well. He is not taking the rosuvastatin and LDL cholesterol is 120mg/dL    The ASCVD Risk score (Deisi DK, et al., 2019) failed to calculate for the following reasons:    The 2019 ASCVD risk score is only valid for ages 40 to 79      ASSESSMENT AND PLAN  Jamari Cota is a 23 year old male with diagnosis of HOANG in the setting of very high LDL cholesterol (180 mg/dL, 10/1/21)  and normal fasting glucose.  Prior to this diagnosis he had a very unhealthy dietary pattern and a sedentary lifestyle which is most likely the cause of his elevated cholesterol rather than a genetic component. After losing 50 pounds and dramatically changing his lifestyle, his LDL cholesterol is down to  120mg/dL and TG are in the normal range. There are some observational data for the benefit of statins with fatty liver disease, but no clinical trials. At this point, I think we can continue to hold statins as long as Jamari maintains his healthy lifestyle. We will plan to check in again in 2 years.    1. Dyslipidemia  2. HOANG    Recommendations  - continue sustainable dietary and lifestyle interventions  - no statin prescription at this time  - follow up in 2 years    Jaime Levi MD, MSc  Cardiovascular Disease Fellow  [facility]    Attending: Patient seen and examined with Dr. Levi. The history and physical findings are accurate as recorded. My additional findings, if any, have been incorporated into the body of the note. All relevant labs and imaging studies and new ECG data have been reviewed personally. The assessment and recommendations outlined reflect our joint decision making.     The total time of this encounter amounted to 39 minutes. This time included time spent with the patient, reviewing records, ordering tests, and performing post visit documentation.     Thank you for the opportunity to participate in the care of Mr. Jamari Cota. Please feel free to contact me with any additional questions or concerns.    Davon Ocasio MD    Preventive Cardiology  Pager: [phone]    PAST MEDICAL HISTORY:  Patient Active Problem List   Diagnosis     Overweight child     Adjustment disorder with depressed mood     Asymmetrical sensorineural hearing loss     Allergic rhinitis due to animals     Past Medical History:   Diagnosis Date     Fatty liver      Otitis media with effusion     chronic     Varicella     about age 3       CURRENT MEDICATIONS:  Current Outpatient Medications   Medication Sig Dispense Refill     hydrOXYzine (ATARAX) 25 MG tablet Take 1 tablet (25 mg) by mouth 3 times daily as needed for itching 90 tablet 2       PAST SURGICAL HISTORY:  Past  "Surgical History:   Procedure Laterality Date     PE TUBES       SURGICAL HISTORY OF -       arm surgery       ALLERGIES  Codeine    FAMILY HX:  Family History   Problem Relation Age of Onset     Hyperlipidemia Father      Diabetes Paternal Grandmother         Type II     Hyperlipidemia Paternal Grandmother      Diabetes Paternal Grandfather         Type II     Hyperlipidemia Paternal Grandfather      Cancer Maternal Uncle         eye cancer     Hypertension No family hx of      Coronary Artery Disease No family hx of      Breast Cancer No family hx of      Colon Cancer No family hx of      Prostate Cancer No family hx of        SOCIAL HX:  Social History     Tobacco Use     Smoking status: Never     Smokeless tobacco: Never     Tobacco comments:     non smoking home, mother and step father smoke   Vaping Use     Vaping status: Never Used   Substance Use Topics     Alcohol use: No     Alcohol/week: 0.0 standard drinks of alcohol     Drug use: No        VITAL SIGNS:  /80 (BP Location: Right arm, Patient Position: Chair, Cuff Size: Adult Regular)   Pulse 68   Ht 1.86 m (6' 1.23\")   Wt 99.4 kg (219 lb 1.6 oz)   SpO2 99%   BMI 28.73 kg/m    Body mass index is 28.73 kg/m .  Wt Readings from Last 2 Encounters:   05/05/23 99.4 kg (219 lb 1.6 oz)   04/17/23 100.2 kg (221 lb)       PHYSICAL EXAM  Gen: pleasant male sitting comfortably in NAD  Head: nc/at  CV: nml s1/s2, no murmur or gallop; no JVD  Chest: clear lungs    LABS: personally reviewed  CMP  Recent Labs   Lab Test 03/30/23  0802 08/15/22  0741 02/14/22  0734 09/28/21  1707    138 140 137   POTASSIUM 4.3 3.9 4.1 3.5   CHLORIDE 107 106 109 104   CO2 23 24 26 25   ANIONGAP 14 8 5 8   GLC 97 101* 93 91   BUN 12.7 13 11 15   CR 0.99 0.91 0.92 1.10   GFRESTIMATED >90 >90 >90 >90   TORI 9.5 9.6 9.1 9.2   PROTTOTAL 7.5 7.9 7.7 8.5   ALBUMIN 4.7 4.3 4.1 4.7   BILITOTAL 1.4* 1.8* 1.6* 1.9*   ALKPHOS 52 60 57 67   AST 27 18 20 78*   ALT 22 27 49 236* "     CBC  Recent Labs   Lab Test 03/30/23  0802 08/15/22  0741 02/14/22  0734 09/28/21  1707   WBC 6.9 8.0 6.5 8.6   RBC 5.45 5.63 5.43 6.05*   HGB 15.5 15.9 15.6 17.2   HCT 47.6 48.7 48.5 51.0   MCV 87 87 89 84   MCH 28.4 28.2 28.7 28.4   MCHC 32.6 32.6 32.2 33.7   RDW 13.4 12.7 13.5 12.1    253 253 314     INR  Recent Labs   Lab Test 03/30/23  0802 08/15/22  0741 02/14/22  0734   INR 1.11 1.06 0.99     Recent Labs   Lab Test 03/30/23  0802 09/02/22  1134   CHOL 181 169   HDL 37* 39*   * 114*   TRIG 119 80     No lab results found.    5/6/22 EKG: normal sinus rhythm, normal ECG

## 2023-05-05 ENCOUNTER — OFFICE VISIT (OUTPATIENT)
Dept: CARDIOLOGY | Facility: CLINIC | Age: 23
End: 2023-05-05
Attending: INTERNAL MEDICINE
Payer: COMMERCIAL

## 2023-05-05 VITALS
HEART RATE: 68 BPM | SYSTOLIC BLOOD PRESSURE: 129 MMHG | OXYGEN SATURATION: 99 % | DIASTOLIC BLOOD PRESSURE: 80 MMHG | WEIGHT: 219.1 LBS | HEIGHT: 73 IN | BODY MASS INDEX: 29.04 KG/M2

## 2023-05-05 DIAGNOSIS — K75.81 NASH (NONALCOHOLIC STEATOHEPATITIS): ICD-10-CM

## 2023-05-05 DIAGNOSIS — E78.2 MIXED HYPERLIPIDEMIA: ICD-10-CM

## 2023-05-05 DIAGNOSIS — K76.0 NAFLD (NONALCOHOLIC FATTY LIVER DISEASE): Primary | ICD-10-CM

## 2023-05-05 PROCEDURE — 99214 OFFICE O/P EST MOD 30 MIN: CPT | Mod: GC | Performed by: INTERNAL MEDICINE

## 2023-05-05 PROCEDURE — G0463 HOSPITAL OUTPT CLINIC VISIT: HCPCS | Performed by: INTERNAL MEDICINE

## 2023-05-05 ASSESSMENT — PAIN SCALES - GENERAL: PAINLEVEL: NO PAIN (0)

## 2023-05-05 NOTE — LETTER
5/5/2023      RE: Jamari Cota  [address]  [facility] [address]       Dear Colleague,    Thank you for the opportunity to participate in the care of your patient, Jamari Cota, at the [facility] [place] at [facility]. Please see a copy of my visit note below.    PREVENTIVE CARDIOLOGY Follow up  Jamari Cota is a 23 year old male who was referred to Cardiology clinic by Dr. Heard in gastroenterology for evaluation management of hyperlipidemia in the setting of nonalcoholic steatohepatitis.    2/4/22  Jamari is a pleasant young man with no history of clinical atherosclerotic CVD.  Other than elevated cholesterol he had some elevated blood pressures in the past, but more recently they have been very well controlled.  He does not smoke and his fasting glucose is normal.  No family history of atherosclerotic CVD.    We was referred to the GI clinic after he was found to have elevated transaminases.  Subsequent CT showed fatty liver disease.  He made major changes to his diet and started exercising.     5/6/22  Jamari is feeling well. He continues to eat health and exercise regularly.   He goes to the gym 3-4 times/week and does 30-40 minutes weights then 10 minute sprint. No chest pain or dyspnea.   He continues to lose weight, about 10 pounds over the past 3 months.     5/1/23 Interval History  Jamari is doing very well. He is exercising 5 days per week. No resting or exertional chest pain or dyspnea.   He has been able to maintain his weight, but would like to lose another 10-20 pounds. He continues to eat well. He is not taking the rosuvastatin and LDL cholesterol is 120mg/dL    The ASCVD Risk score ([place] DK, et al., 2019) failed to calculate for the following reasons:    The 2019 ASCVD risk score is only valid for ages 40 to 79      ASSESSMENT AND PLAN  Jamari Cota is a 23 year old male with diagnosis of HOANG in the setting of very high LDL  cholesterol (180 mg/dL, 10/1/21)  and normal fasting glucose.  Prior to this diagnosis he had a very unhealthy dietary pattern and a sedentary lifestyle which is most likely the cause of his elevated cholesterol rather than a genetic component. After losing 50 pounds and dramatically changing his lifestyle, his LDL cholesterol is down to 120mg/dL and TG are in the normal range. There are some observational data for the benefit of statins with fatty liver disease, but no clinical trials. At this point, I think we can continue to hold statins as long as Jamari maintains his healthy lifestyle. We will plan to check in again in 2 years.    1. Dyslipidemia  2. HOANG    Recommendations  - continue sustainable dietary and lifestyle interventions  - no statin prescription at this time  - follow up in 2 years    Jaime Levi MD, MSc  Cardiovascular Disease Fellow  [facility]    Attending: Patient seen and examined with Dr. Levi. The history and physical findings are accurate as recorded. My additional findings, if any, have been incorporated into the body of the note. All relevant labs and imaging studies and new ECG data have been reviewed personally. The assessment and recommendations outlined reflect our joint decision making.     The total time of this encounter amounted to 39 minutes. This time included time spent with the patient, reviewing records, ordering tests, and performing post visit documentation.     Thank you for the opportunity to participate in the care of Mr. Jamari Cota. Please feel free to contact me with any additional questions or concerns.    Davon Ocasio MD    Preventive Cardiology  Pager: [phone]    PAST MEDICAL HISTORY:  Patient Active Problem List   Diagnosis     Overweight child     Adjustment disorder with depressed mood     Asymmetrical sensorineural hearing loss     Allergic rhinitis due to animals     Past Medical History:   Diagnosis  "Date     Fatty liver      Otitis media with effusion     chronic     Varicella     about age 3       CURRENT MEDICATIONS:  Current Outpatient Medications   Medication Sig Dispense Refill     hydrOXYzine (ATARAX) 25 MG tablet Take 1 tablet (25 mg) by mouth 3 times daily as needed for itching 90 tablet 2       PAST SURGICAL HISTORY:  Past Surgical History:   Procedure Laterality Date     PE TUBES       SURGICAL HISTORY OF -       arm surgery       ALLERGIES  Codeine    FAMILY HX:  Family History   Problem Relation Age of Onset     Hyperlipidemia Father      Diabetes Paternal Grandmother         Type II     Hyperlipidemia Paternal Grandmother      Diabetes Paternal Grandfather         Type II     Hyperlipidemia Paternal Grandfather      Cancer Maternal Uncle         eye cancer     Hypertension No family hx of      Coronary Artery Disease No family hx of      Breast Cancer No family hx of      Colon Cancer No family hx of      Prostate Cancer No family hx of        SOCIAL HX:  Social History     Tobacco Use     Smoking status: Never     Smokeless tobacco: Never     Tobacco comments:     non smoking home, mother and step father smoke   Vaping Use     Vaping status: Never Used   Substance Use Topics     Alcohol use: No     Alcohol/week: 0.0 standard drinks of alcohol     Drug use: No        VITAL SIGNS:  /80 (BP Location: Right arm, Patient Position: Chair, Cuff Size: Adult Regular)   Pulse 68   Ht 1.86 m (6' 1.23\")   Wt 99.4 kg (219 lb 1.6 oz)   SpO2 99%   BMI 28.73 kg/m    Body mass index is 28.73 kg/m .  Wt Readings from Last 2 Encounters:   05/05/23 99.4 kg (219 lb 1.6 oz)   04/17/23 100.2 kg (221 lb)       PHYSICAL EXAM  Gen: pleasant male sitting comfortably in NAD  Head: nc/at  CV: nml s1/s2, no murmur or gallop; no JVD  Chest: clear lungs    LABS: personally reviewed  CMP  Recent Labs   Lab Test 03/30/23  0802 08/15/22  0741 02/14/22  0734 09/28/21  1707    138 140 137   POTASSIUM 4.3 3.9 4.1 3.5 "   CHLORIDE 107 106 109 104   CO2 23 24 26 25   ANIONGAP 14 8 5 8   GLC 97 101* 93 91   BUN 12.7 13 11 15   CR 0.99 0.91 0.92 1.10   GFRESTIMATED >90 >90 >90 >90   TORI 9.5 9.6 9.1 9.2   PROTTOTAL 7.5 7.9 7.7 8.5   ALBUMIN 4.7 4.3 4.1 4.7   BILITOTAL 1.4* 1.8* 1.6* 1.9*   ALKPHOS 52 60 57 67   AST 27 18 20 78*   ALT 22 27 49 236*     CBC  Recent Labs   Lab Test 03/30/23  0802 08/15/22  0741 02/14/22  0734 09/28/21  1707   WBC 6.9 8.0 6.5 8.6   RBC 5.45 5.63 5.43 6.05*   HGB 15.5 15.9 15.6 17.2   HCT 47.6 48.7 48.5 51.0   MCV 87 87 89 84   MCH 28.4 28.2 28.7 28.4   MCHC 32.6 32.6 32.2 33.7   RDW 13.4 12.7 13.5 12.1    253 253 314     INR  Recent Labs   Lab Test 03/30/23  0802 08/15/22  0741 02/14/22  0734   INR 1.11 1.06 0.99     Recent Labs   Lab Test 03/30/23  0802 09/02/22  1134   CHOL 181 169   HDL 37* 39*   * 114*   TRIG 119 80     No lab results found.    5/6/22 EKG: normal sinus rhythm, normal ECG      Please do not hesitate to contact me if you have any questions/concerns.     Sincerely,     Davon Ocasio MD

## 2023-05-05 NOTE — NURSING NOTE
No changes made today. Follow up with Dr. Virgil Ocasio as needed.     Rober Neely, RN   Cardiology Nurse Coordinator

## 2023-05-05 NOTE — NURSING NOTE
Chief Complaint   Patient presents with     Follow Up     Prevention F/U     Vitals were taken and medications reconciled.    Declan Quintero, EMT  7:07 AM

## 2023-05-05 NOTE — PATIENT INSTRUCTIONS
"You were seen today in the Cardiovascular Clinic at the [facility].     Cardiology Providers you saw during your visit: Dr. Virgil Ocasio     Diagnosis:   Encounter Diagnoses   Name Primary?    Mixed hyperlipidemia     HOANG (nonalcoholic steatohepatitis)         Recommendations:   1. No change in medications.   2. Follow up in preventive cardiology clinic in 2 years to check on weight, lifestyle, and medications.       Please feel free to call me with any questions or concerns.       Ivon Bejarano RN     Questions: [phone].   First press #1 for the [facility] and then press #4 for \"Medical Questions\" to reach us Cardiology Nurses.     [phone].   First press #1 for the [facility] and then press #1     On Call Cardiologist for after hours or on weekends: [phone]   option #4 and ask to speak to the on-call Cardiologist.          If you need a medication refill please contact your pharmacy.  Please allow 3 business days for your refill to be completed.  ________________________________________________________________________________________________________________________________    "

## 2023-05-31 ASSESSMENT — ANXIETY QUESTIONNAIRES
GAD7 TOTAL SCORE: 9
7. FEELING AFRAID AS IF SOMETHING AWFUL MIGHT HAPPEN: SEVERAL DAYS
7. FEELING AFRAID AS IF SOMETHING AWFUL MIGHT HAPPEN: SEVERAL DAYS
5. BEING SO RESTLESS THAT IT IS HARD TO SIT STILL: MORE THAN HALF THE DAYS
3. WORRYING TOO MUCH ABOUT DIFFERENT THINGS: SEVERAL DAYS
4. TROUBLE RELAXING: SEVERAL DAYS
GAD7 TOTAL SCORE: 9
6. BECOMING EASILY ANNOYED OR IRRITABLE: SEVERAL DAYS
IF YOU CHECKED OFF ANY PROBLEMS ON THIS QUESTIONNAIRE, HOW DIFFICULT HAVE THESE PROBLEMS MADE IT FOR YOU TO DO YOUR WORK, TAKE CARE OF THINGS AT HOME, OR GET ALONG WITH OTHER PEOPLE: SOMEWHAT DIFFICULT
2. NOT BEING ABLE TO STOP OR CONTROL WORRYING: SEVERAL DAYS
GAD7 TOTAL SCORE: 9
1. FEELING NERVOUS, ANXIOUS, OR ON EDGE: MORE THAN HALF THE DAYS
8. IF YOU CHECKED OFF ANY PROBLEMS, HOW DIFFICULT HAVE THESE MADE IT FOR YOU TO DO YOUR WORK, TAKE CARE OF THINGS AT HOME, OR GET ALONG WITH OTHER PEOPLE?: SOMEWHAT DIFFICULT

## 2023-06-01 ENCOUNTER — VIRTUAL VISIT (OUTPATIENT)
Dept: BEHAVIORAL HEALTH | Facility: CLINIC | Age: 23
End: 2023-06-01
Payer: COMMERCIAL

## 2023-06-01 DIAGNOSIS — F43.23 ADJUSTMENT DISORDER WITH MIXED ANXIETY AND DEPRESSED MOOD: Primary | ICD-10-CM

## 2023-06-01 PROCEDURE — 90837 PSYTX W PT 60 MINUTES: CPT | Mod: VID | Performed by: COUNSELOR

## 2023-06-01 NOTE — PROGRESS NOTES
[facility]                                      Progress Note    Patient Name: Jamari Cota  Date: 6/01/23         Service Type: Individual      Session Start Time: 10:00 am  Session End Time: 11:00 am     Session Length: 60 minutes    Session #: 19    Attendees: Client attended alone    Service Modality:  Video Visit:      Provider verified identity through the following two step process.  Patient provided:  Patient is known previously to provider    Telemedicine Visit: The patient's condition can be safely assessed and treated via synchronous audio and visual telemedicine encounter.      Reason for Telemedicine Visit: Services only offered telehealth    Originating Site (Patient Location): Patient's home     Distant Site (Provider Location): Provider Remote Setting- Home Office    Consent:  The patient/guardian has verbally consented to: the potential risks and benefits of telemedicine (video visit) versus in person care; bill my insurance or make self-payment for services provided; and responsibility for payment of non-covered services.     Patient would like the video invitation sent by:  My Chart    Mode of Communication:  Video Conference via Amwell    As the provider I attest to compliance with applicable laws and regulations related to telemedicine.    DATA  Interactive Complexity: No  Crisis: No     Extended Session (53+ minutes): PROLONGED SERVICE IN THE OUTPATIENT SETTING REQUIRING DIRECT (FACE-TO-FACE) PATIENT CONTACT BEYOND THE USUAL SERVICE:    - Longer session due to limited access to mental health appointments and necessity to address patient's distress / complexity    - Patient's presenting concerns require more intensive intervention than could be completed within the usual service      Progress Since Last Session (Related to Symptoms / Goals / Homework):   Symptoms: Improving symptoms overall, though is expeirencing significant anxiety still     Homework: Achieved /  completed to satisfaction      Episode of Care Goals: Satisfactory progress - ACTION (Actively working towards change); Intervened by reinforcing change plan / affirming steps taken     Current / Ongoing Stressors and Concerns:   Pt is currently seeking therapy related to depression and anxiety around self-exploration related to his bi-sexual sexual identity and has recent come out, which is something he is still navigating. Pt grew up in a very conservative [place] home, where same-sex relations were not supported, which has caused him to have an internal struggle with being his authentic self. Since last session, pt reported that he has gone through a lot of changes and expressed stopping therapy due to feeling overwhelmed and having a lot of personal stuff to address. Pt confirmed that he has come out to his friend and family (except his dad), which was challenging though went better then expected. Pt processed through the loss of a long time friend after he came out and how this was painful. Pt identified that he has been having increased physical symptoms of anxiety that impact his ability to communicate with people he has regular contact with, especially at work. Pt described these physical symptoms as being shortness of breath or unable to catch his breath, which when they notice he feels like he needs to escape the sitaution. Pt reflected on how the loss of his friend when coming out may have caused him to feel more anxious when talking to others due to fear of loosing more relationships, even if those interactions do not have anything to do with his sexual orientation. Pt has also tried dating a bit and found that to be a disheartening experience, as he feels everyone he has met wants to just become intimate right away, which is something he wants to ease into. Pt acknowledged that trying to date as an adult is new to him and that singh culture stereotypically overemphasizes the intimate part of dating in a  way that doesn't align with his own values, making it extra challenging. Pt has been doing better with taking care of himself with regular exercises and improved diet. Session took longer then anticipated due to the presenting concerns needing more time to process through then usual and lack of recent mental health appointments.     Topic for the future: second round of dating with ex-girlfriend; normalizing LGBT through media exposure; Cognitive distortions. wanting to not use delta 8 to manage anxiety (helpful vs. Unhelpful coping skills)    Singing and playing guitar is a big stress relief    Current coping skills-  Music  Breathing technique  D8 pen (vape) - unhelpful  Eating - unhelpful  Socializing  Reading to escape (games books dnd) - unhelpful in excess   Exercising  Singing (car singing)  Staying busy at work a lot with low-priority or off tasks - unhelpful    Look up LGBT people who are surprising to pt, and bring 3 examples - openly singh [employer] member, music artists, [employer] channel: middle ground   Make list of 5 potential jobs or job fields you may want to go into - doesn't feel he has the confidence to apply for some jobs due to internalizing some feelings of past failure.      Treatment Objective(s) Addressed in This Session:   identify current thoughts, triggers, and stressors which contribute to feelings of depression  identify unhelpful or unrealistic fears / thoughts that contribute to feeling anxious  Increase interest, engagement, and pleasure in doing things  Decrease frequency and intensity of feeling down, depressed, hopeless  Feel less tired and more energy during the day   Identify negative self-talk and behaviors: challenge core beliefs, myths, and actions  identify coping strategies for improving mood  identify two areas of life that you would like to have improved functioning     Intervention:   CBT: Discussed how he had not moved on from his last major relationship, until  now.   Psychodynamic: Processed internal-experiences related to being hurt in past relationships.   Solution Focused: Identified areas in which pt is self-imposing barriers and possible strategies to overcome them.    Motivational Interviewing    MI Intervention: Expressed Empathy/Understanding, Supported Autonomy, Collaboration, Evocation, Open-ended questions, Rolled with resistance: Emphasized patient autonomy, Simple reflection, Shifted topic to defuse resistance, Reframed sustain talk in the direction of change and Evoked patient agenda, Change talk (evoked) and Reframe     Change Talk Expressed by the Patient: Desire to change Ability to change Reasons to change Committment to change Activation Taking steps    Provider Response to Change Talk: E - Evoked more info from patient about behavior change, A - Affirmed patient's thoughts, decisions, or attempts at behavior change, R - Reflected patient's change talk and S - Summarized patient's change talk statements        Assessments completed prior to visit:  PHQ2:       3/30/2023     7:11 AM 3/30/2023     7:10 AM 9/2/2022    10:43 AM 9/2/2022    10:37 AM 7/22/2022     1:00 PM 7/15/2022     1:03 PM 7/1/2022    12:58 PM   PHQ-2 ( 1999 [employer])   Q1: Little interest or pleasure in doing things 1 1 0 0 1 1 1   Q2: Feeling down, depressed or hopeless 1 1 0 0 1 1 1   PHQ-2 Score 2 2 0 0 2 2 2   Q1: Little interest or pleasure in doing things Several days    Several days   Not at all Several days Several days Several days   Q2: Feeling down, depressed or hopeless Several days    Several days   Not at all Several days Several days Several days   PHQ-2 Score 2    2   0 2 2 2     PHQ9:       7/13/2015    11:20 AM 12/3/2021     7:36 AM 1/24/2022    12:06 PM 2/21/2022    12:42 PM 9/2/2022    10:43 AM   PHQ-9 SCORE   PHQ-9 Total Score 17       PHQ-9 Total Score MyChart  6 (Mild depression)      PHQ-9 Total Score  6 8 6 1     GAD2:       12/14/2021     7:43 AM 3/18/2022     11:00 AM 6/17/2022     1:02 PM 7/1/2022    12:58 PM 7/15/2022     1:04 PM 5/31/2023    12:23 PM   MILAD-2   Feeling nervous, anxious, or on edge 1 1 1    1 1 1    1 2   Not being able to stop or control worrying 1 1 1    1 1 1    1 1   MILAD-2 Total Score 2 2 2    2 2 2    2 3     GAD7:       12/3/2021     7:37 AM 1/24/2022    12:06 PM 2/21/2022    12:42 PM 9/2/2022    10:43 AM 5/31/2023    12:23 PM   MILAD-7 SCORE   Total Score 5 (mild anxiety)    9 (mild anxiety)   Total Score 5 7 8 1 9     PROMIS 10-Global Health (only subscores and total score):       12/14/2021     7:44 AM 2/21/2022    12:42 PM 3/18/2022    11:01 AM 6/17/2022     1:02 PM 7/1/2022    12:59 PM 7/15/2022     1:05 PM 5/31/2023    12:25 PM   PROMIS-10 Scores Only   Global Mental Health Score 10 14 11 11    11 11 11    11 8   Global Physical Health Score 14 17 16 14    14 17 17    17 15   PROMIS TOTAL - SUBSCORES 24 31 27 25    25 28 28    28 23         ASSESSMENT: Current Emotional / Mental Status (status of significant symptoms):   Risk status (Self / Other harm or suicidal ideation)   Patient denies current fears or concerns for personal safety.   Patient denies current or recent suicidal ideation or behaviors.   Patient denies current or recent homicidal ideation or behaviors.   Patient denies current or recent self injurious behavior or ideation.   Patient denies other safety concerns.   Patient reports there has been no change in risk factors since their last session.     Patient reports there has been no change in protective factors since their last session.     Recommended that patient call 911 or go to the local ED should there be a change in any of these risk factors.     Appearance:   Appropriate    Eye Contact:   Good    Psychomotor Behavior: Normal    Attitude:   Cooperative  Interested Friendly Pleasant   Orientation:   All   Speech    Rate / Production: Normal/ Responsive Talkative    Volume:  Normal    Mood:    Anxious   Normal   Affect:    Appropriate    Thought Content:  Clear    Thought Form:  Coherent  Logical    Insight:    Fair  and Intellectual Insight     Medication Review:   No changes to current psychiatric medication(s)     Medication Compliance:   Yes     Changes in Health Issues:   None reported     Chemical Use Review:   Substance Use: Chemical use reviewed, no active concerns identified      Tobacco Use: No current tobacco use.      Diagnosis:  1. Adjustment disorder with mixed anxiety and depressed mood        Collateral Reports Completed:   Not Applicable    PLAN: (Patient Tasks / Therapist Tasks / Other)  Continue to work on interoceptives  Work on reframing  Encourage self to engage in activities you want to try     Follow-up on latter:  Continue to work on seeing the trees instead of the forest     Keaton Stein [facility]                                                       ______________________________________________________________________    Individual Treatment Plan    Patient's Name: Jamari Cota  YOB: 2000    Date of Creation: 3/4/22  Date Treatment Plan Last Reviewed/Revised: 6/1/23    DSM5 Diagnoses: Adjustment Disorders  309.28 (F43.23) With mixed anxiety and depressed mood  Psychosocial / Contextual Factors: concerns about acceptance by family, pt has started dating, financial stressors  PROMIS (reviewed every 90 days):   PROMIS 10-Global Health (only subscores and total score):       12/14/2021     7:44 AM 2/21/2022    12:42 PM 3/18/2022    11:01 AM 6/17/2022     1:02 PM 7/1/2022    12:59 PM 7/15/2022     1:05 PM 5/31/2023    12:25 PM   PROMIS-10 Scores Only   Global Mental Health Score 10 14 11 11    11 11 11    11 8   Global Physical Health Score 14 17 16 14    14 17 17    17 15   PROMIS TOTAL - SUBSCORES 24 31 27 25    25 28 28    28 23       Referral / Collaboration:  Referral to another professional/service is not indicated at this time..    Anticipated number of session for  this episode of care: 8-13  Anticipation frequency of session: Biweekly  Anticipated Duration of each session: 38-52 minutes  Treatment plan will be reviewed in 90 days or when goals have been changed.       MeasurableTreatment Goal(s) related to diagnosis / functional impairment(s)  Goal 1: Patient will develop helpful coping skills, while learning to recognize unhelpful ones, and increase/expand his support system.     I will know I've met my goal when I am more able to feel more confident in myself and decisions/presetation.     Objective #A (Patient Action)    Patient will identify helpful vs unhelpful coping strategies to more effectively address stressors  exercise for 30 minutes 3 times per week for 30 minutes  Decrease frequency and intensity of feeling down, depressed, hopeless  Improve diet, appetite, mindful eating, and / or meal planning  Identify negative self-talk and behaviors: challenge core beliefs, myths, and actions  identify 5 coping strategies for improving mood  practice one mindfulness skill each day for 5-10 minutes.  Status: Continued - Date(s):  6/1/23    Intervention(s)  Therapist will assign homework related to target objective with the intent to promote pt autonomy and better manage MH.  Facilitate increase in self-awareness  Provide psycho-education on mindfulness and helpful vs. unhelpful coping identification  Teach/promote utilization of coping skill development/exploration and mindfulness techniques    Objective #B Pt will learn how to develop and identify a helpful support system.  Patient will participate in social or interpersonal activities to improve mood  attend and participate in social or recreational activities to expand social network/support  Increase interest, engagement, and pleasure in doing things  Identify negative self-talk and behaviors: challenge core beliefs, myths, and actions  track and record at least 3 pleasant exchanges with friends and new people.  Status:  "Continued - Date(s): 6/1/23    Intervention(s)  Therapist will assign homework related to target objective with the intent to promote pt autonomy and better manage MH.  Facilitate increase in self-awareness  Provide psycho-education on social distortions/fallacies and boundaries  Teach/promote utilization of social engagement skills and problem solving    Goal 2: Patient will be more able to engage socially and feel less anxious when interacting in a group setting.     I will know I've met my goal when I am able to be less stressed/anxious in social interactions, especially in group settings/activities.      Objective #A (Patient Action)    Status: Continued - Date(s): 6/1/23    Patient will identify and compliment positives at least 3 times daily to support positive self-image  identify unhelpful or unrealistic thoughts/concerns and stressors which contribute to feelings of anxiety  learn and demonstrate pro-social and communication assertiveness skill(s)  initiate 3 social contact(s) per day for increasing lengths of time  practice setting boundaries 3 times in the next several weeks  accept compliment with a \"thank you\" and no self deprecating comments.    Intervention(s)  Therapist will assign homework related to target objective with the intent to promote pt autonomy and better manage MH.  Facilitate increase in self-awareness  Provide psycho-education on social anxiety thinking and communication styles  Teach/promote utilization of thought challenging and self-confidence building    Goal 3: Patient will work on processing through self-identity and how to express his authentic or genuine self.     I will know I've met my goal when I feel more comfortable being my authentic or genuine self.      Objective #A (Patient Action)  Status: Continued - Date(s): 6/1/23    Patient will identify communication strategies to more effectively address stressors  use thought-stopping strategy daily to reduce intrusive " thoughts  Identify negative self-talk and behaviors: challenge core beliefs, myths, and actions  identify how the use of substances contributes to the avoidance of feeling associated with the loss  identify the time in your life when you began to feel poorly about themselves.  Use positive self-affirmation daily to promote self-acceptance.    Intervention(s)  Therapist will assign homework related to target objective with the intent to promote pt autonomy and better manage MH.  Facilitate increase in self-awareness  Provide psycho-education on self-compassion and anticipatory grief and avoidance  Teach/promote utilization of self-care/acceptance and processing through internal-experiences    Patient has reviewed and agreed to the above plan.      Keaton Stein, LPCC  LPCC  March 4, 2022

## 2023-06-06 ENCOUNTER — VIRTUAL VISIT (OUTPATIENT)
Dept: BEHAVIORAL HEALTH | Facility: CLINIC | Age: 23
End: 2023-06-06
Payer: COMMERCIAL

## 2023-06-06 DIAGNOSIS — F43.23 ADJUSTMENT DISORDER WITH MIXED ANXIETY AND DEPRESSED MOOD: Primary | ICD-10-CM

## 2023-06-06 PROCEDURE — 90837 PSYTX W PT 60 MINUTES: CPT | Mod: VID | Performed by: COUNSELOR

## 2023-06-06 NOTE — PROGRESS NOTES
[facility]                                      Progress Note    Patient Name: Jamari Cota  Date: 6/06/23         Service Type: Individual      Session Start Time: 10:00 am  Session End Time: 11:00 am     Session Length: 60 minutes    Session #: 19    Attendees: Client attended alone    Service Modality:  Video Visit:      Provider verified identity through the following two step process.  Patient provided:  Patient is known previously to provider    Telemedicine Visit: The patient's condition can be safely assessed and treated via synchronous audio and visual telemedicine encounter.      Reason for Telemedicine Visit: Services only offered telehealth    Originating Site (Patient Location): Patient's home     Distant Site (Provider Location): Provider Remote Setting- Home Office    Consent:  The patient/guardian has verbally consented to: the potential risks and benefits of telemedicine (video visit) versus in person care; bill my insurance or make self-payment for services provided; and responsibility for payment of non-covered services.     Patient would like the video invitation sent by:  My Chart    Mode of Communication:  Video Conference via [employer]    As the provider I attest to compliance with applicable laws and regulations related to telemedicine.    DATA  Interactive Complexity: No  Crisis: No     Extended Session (53+ minutes): PROLONGED SERVICE IN THE OUTPATIENT SETTING REQUIRING DIRECT (FACE-TO-FACE) PATIENT CONTACT BEYOND THE USUAL SERVICE:    - Longer session due to limited access to mental health appointments and necessity to address patient's distress / complexity    - Patient's presenting concerns require more intensive intervention than could be completed within the usual service      Progress Since Last Session (Related to Symptoms / Goals / Homework):   Symptoms: Improving symptoms, better awarness and management     Homework: Partially completed      Episode of Care  Goals: Satisfactory progress - ACTION (Actively working towards change); Intervened by reinforcing change plan / affirming steps taken     Current / Ongoing Stressors and Concerns:   Pt is currently seeking therapy related to depression and anxiety around self-exploration related to his bi-sexual sexual identity and has recent come out, which is something he is still navigating. Pt grew up in a very conservative [place] home, where same-sex relations were not supported, which has caused him to have an internal struggle with being his authentic self. Since last session, pt reported that things haven't been to bad and has been spending more time outside on his new bike. Pt processed through how things have been going since ending therapy last December, reflecting that he made a lot of tough decisions that now that he has he feels better. Pt reviewed/updated his trx plan taking these changes into account and adjusting it after reflecting on it from last week. Pt processed through things he has been introspecting on and determined he wants help with. Pt verbalized feeing that he is not able to express himself effectively when asking for help from others, as they commonly don't seem to believe he needs help when asking for it. Pt discussed what is and isn't his responsibility when it comes to asking for help and how he needs to work on reminding himself that other's can't read his mind. Pt also wants to work on framing things like he needs to sacrifice himself or something in order to feel like a good person. After reflecting on it pt identified feeling like he is playing by a set of social rules he was taught, that were not correct and now is unsure how to change that. Pt was able to acknowledge change is possible and that he is doing much better with that overall, though this is one area he still feels very stuck. Session took longer then anticipated due to the presenting concerns needing more time to process through  then usual and lack of recent mental health appointments.     Topic for the future: second round of dating with ex-girlfriend; normalizing LGBT through media exposure; Cognitive distortions. wanting to not use delta 8 to manage anxiety (helpful vs. Unhelpful coping skills)    Singing and playing guitar is a big stress relief    Current coping skills-  Music  Breathing technique  D8 pen (vape) - unhelpful  Eating - unhelpful  Socializing  Reading to escape (games books dnd) - unhelpful in excess   Exercising  Singing (car singing)  Staying busy at work a lot with low-priority or off tasks - unhelpful    Look up LGBT people who are surprising to pt, and bring 3 examples - openly singh [employer] member, music artists, [employer] channel: middle ground   Make list of 5 potential jobs or job fields you may want to go into - doesn't feel he has the confidence to apply for some jobs due to internalizing some feelings of past failure.      Treatment Objective(s) Addressed in This Session:   identify current thoughts, triggers, and stressors which contribute to feelings of depression  identify unhelpful or unrealistic fears / thoughts that contribute to feeling anxious  Increase interest, engagement, and pleasure in doing things  Decrease frequency and intensity of feeling down, depressed, hopeless  Feel less tired and more energy during the day   Identify negative self-talk and behaviors: challenge core beliefs, myths, and actions  identify coping strategies for improving mood  identify two areas of life that you would like to have improved functioning  Reviewed/updated trx plan     Intervention:   CBT: Discussed how he had not moved on from his last major relationship, until now.   Psychodynamic: Processed internal-experiences related to being hurt in past relationships.   Solution Focused: Identified areas in which pt is self-imposing barriers and possible strategies to overcome them.    Motivational Interviewing    MI  Intervention: Expressed Empathy/Understanding, Supported Autonomy, Collaboration, Evocation, Open-ended questions, Rolled with resistance: Emphasized patient autonomy, Simple reflection, Shifted topic to defuse resistance, Reframed sustain talk in the direction of change and Evoked patient agenda, Change talk (evoked) and Reframe     Change Talk Expressed by the Patient: Desire to change Ability to change Reasons to change Committment to change Activation Taking steps    Provider Response to Change Talk: E - Evoked more info from patient about behavior change, A - Affirmed patient's thoughts, decisions, or attempts at behavior change, R - Reflected patient's change talk and S - Summarized patient's change talk statements        Assessments completed prior to visit:  PHQ2:       3/30/2023     7:11 AM 3/30/2023     7:10 AM 9/2/2022    10:43 AM 9/2/2022    10:37 AM 7/22/2022     1:00 PM 7/15/2022     1:03 PM 7/1/2022    12:58 PM   PHQ-2 ( 1999 [employer])   Q1: Little interest or pleasure in doing things 1 1 0 0 1 1 1   Q2: Feeling down, depressed or hopeless 1 1 0 0 1 1 1   PHQ-2 Score 2 2 0 0 2 2 2   Q1: Little interest or pleasure in doing things Several days    Several days   Not at all Several days Several days Several days   Q2: Feeling down, depressed or hopeless Several days    Several days   Not at all Several days Several days Several days   PHQ-2 Score 2    2   0 2 2 2     PHQ9:       7/13/2015    11:20 AM 12/3/2021     7:36 AM 1/24/2022    12:06 PM 2/21/2022    12:42 PM 9/2/2022    10:43 AM   PHQ-9 SCORE   PHQ-9 Total Score 17       PHQ-9 Total Score MyChart  6 (Mild depression)      PHQ-9 Total Score  6 8 6 1     GAD2:       12/14/2021     7:43 AM 3/18/2022    11:00 AM 6/17/2022     1:02 PM 7/1/2022    12:58 PM 7/15/2022     1:04 PM 5/31/2023    12:23 PM   MILAD-2   Feeling nervous, anxious, or on edge 1 1 1    1 1 1    1 2   Not being able to stop or control worrying 1 1 1    1 1 1    1 1   MILAD-2 Total Score 2  2 2    2 2 2    2 3     GAD7:       12/3/2021     7:37 AM 1/24/2022    12:06 PM 2/21/2022    12:42 PM 9/2/2022    10:43 AM 5/31/2023    12:23 PM   MILAD-7 SCORE   Total Score 5 (mild anxiety)    9 (mild anxiety)   Total Score 5 7 8 1 9     PROMIS 10-Global Health (only subscores and total score):       12/14/2021     7:44 AM 2/21/2022    12:42 PM 3/18/2022    11:01 AM 6/17/2022     1:02 PM 7/1/2022    12:59 PM 7/15/2022     1:05 PM 5/31/2023    12:25 PM   PROMIS-10 Scores Only   Global Mental Health Score 10 14 11 11    11 11 11    11 8   Global Physical Health Score 14 17 16 14    14 17 17    17 15   PROMIS TOTAL - SUBSCORES 24 31 27 25    25 28 28    28 23         ASSESSMENT: Current Emotional / Mental Status (status of significant symptoms):   Risk status (Self / Other harm or suicidal ideation)   Patient denies current fears or concerns for personal safety.   Patient denies current or recent suicidal ideation or behaviors.   Patient denies current or recent homicidal ideation or behaviors.   Patient denies current or recent self injurious behavior or ideation.   Patient denies other safety concerns.   Patient reports there has been no change in risk factors since their last session.     Patient reports there has been no change in protective factors since their last session.     Recommended that patient call 911 or go to the local ED should there be a change in any of these risk factors.     Appearance:   Appropriate    Eye Contact:   Good    Psychomotor Behavior: Normal    Attitude:   Cooperative  Interested Friendly Pleasant   Orientation:   All   Speech    Rate / Production: Normal/ Responsive Talkative    Volume:  Normal    Mood:    Anxious  Normal   Affect:    Appropriate    Thought Content:  Clear    Thought Form:  Coherent  Logical    Insight:    Fair  and Intellectual Insight     Medication Review:   No changes to current psychiatric medication(s)     Medication Compliance:   Yes     Changes in Health  Issues:   None reported     Chemical Use Review:   Substance Use: Chemical use reviewed, no active concerns identified      Tobacco Use: No current tobacco use.      Diagnosis:  1. Adjustment disorder with mixed anxiety and depressed mood        Collateral Reports Completed:   Not Applicable    PLAN: (Patient Tasks / Therapist Tasks / Other)  Continue to work on interoceptives   Encourage self to engage in activities you want to try   Review resources sent through [employer]  Be more explicit in how you want people to help you when asking    Keaton Stein [facility]                                                       ______________________________________________________________________    Individual Treatment Plan    Patient's Name: Jamari Cota  YOB: 2000    Date of Creation: 3/4/22  Date Treatment Plan Last Reviewed/Revised: 6/6/23    DSM5 Diagnoses: Adjustment Disorders  309.28 (F43.23) With mixed anxiety and depressed mood  Psychosocial / Contextual Factors: concerns about acceptance by family, pt has started dating, financial stressors  PROMIS (reviewed every 90 days):   PROMIS 10-Global Health (only subscores and total score):       12/14/2021     7:44 AM 2/21/2022    12:42 PM 3/18/2022    11:01 AM 6/17/2022     1:02 PM 7/1/2022    12:59 PM 7/15/2022     1:05 PM 5/31/2023    12:25 PM   PROMIS-10 Scores Only   Global Mental Health Score 10 14 11 11    11 11 11    11 8   Global Physical Health Score 14 17 16 14    14 17 17    17 15   PROMIS TOTAL - SUBSCORES 24 31 27 25    25 28 28    28 23       Referral / Collaboration:  Referral to another professional/service is not indicated at this time..    Anticipated number of session for this episode of care: 8-13  Anticipation frequency of session: Biweekly  Anticipated Duration of each session: 38-52 minutes  Treatment plan will be reviewed in 90 days or when goals have been changed.       MeasurableTreatment Goal(s) related to diagnosis /  functional impairment(s)  Goal 1: Patient will develop helpful coping skills, while learning to recognize unhelpful ones, and build up his inner strength.     I will know I've met my goal when I am more able to feel more confident in myself and decisions/presetation.     Objective #A (Patient Action)    Patient will identify helpful vs unhelpful coping strategies to more effectively address stressors  exercise for 30 minutes 3 times per week for 30 minutes  Decrease frequency and intensity of feeling down, depressed, hopeless  Improve diet, appetite, mindful eating, and / or meal planning  Identify negative self-talk and behaviors: challenge core beliefs, myths, and actions  identify 5 coping strategies for improving mood  practice one mindfulness skill each day for 5-10 minutes.  Status: Continued - Date(s):  6/6/23    Intervention(s)  Therapist will assign homework related to target objective with the intent to promote pt autonomy and better manage MH.  Facilitate increase in self-awareness  Provide psycho-education on mindfulness and helpful vs. unhelpful coping identification  Teach/promote utilization of coping skill development/exploration and mindfulness techniques    Objective #B Pt will learn how to develop and identify a helpful support system.  Patient will participate in social or interpersonal activities to improve mood  attend and participate in social or recreational activities to expand social network/support  Increase interest, engagement, and pleasure in doing things  Identify negative self-talk and behaviors: challenge core beliefs, myths, and actions  track and record at least 3 pleasant exchanges with friends and new people.  Status: Deferred - Date: 6/6/23     Intervention(s)  Therapist will assign homework related to target objective with the intent to promote pt autonomy and better manage MH.  Facilitate increase in self-awareness  Provide psycho-education on social distortions/fallacies and  "boundaries  Teach/promote utilization of social engagement skills and problem solving    Goal 2: Patient will be more able to engage socially and feel less anxious when interacting with others more consistently.     I will know I've met my goal when I am able to be less stressed/anxious in social interactions.      Objective #A (Patient Action)    Status: Continued - Date(s): 6/6/23    Patient will identify and compliment positives at least 3 times daily to support positive self-image  identify unhelpful or unrealistic thoughts/concerns and stressors which contribute to feelings of anxiety  learn and demonstrate pro-social and communication assertiveness skill(s)  initiate 3 social contact(s) per day for increasing lengths of time  practice setting boundaries 3 times in the next several weeks  accept compliment with a \"thank you\" and no self deprecating comments.    Intervention(s)  Therapist will assign homework related to target objective with the intent to promote pt autonomy and better manage MH.  Facilitate increase in self-awareness  Provide psycho-education on social anxiety thinking and communication styles  Teach/promote utilization of thought challenging and self-confidence building    Goal 3: Patient will work on processing through self-identity and how to express his authentic or genuine self while navigating relationships, especially pre-existing ones.     I will know I've met my goal when I feel more comfortable being my authentic or genuine self with people I know.      Objective #A (Patient Action)  Status: Continued - Date(s): 6/6/23    Patient will identify communication strategies to more effectively address stressors  use thought-stopping strategy daily to reduce intrusive thoughts  Identify negative self-talk and behaviors: challenge core beliefs, myths, and actions  identify how the use of substances contributes to the avoidance of feeling associated with the loss  identify the time in your " life when you began to feel poorly about themselves.  Use positive self-affirmation daily to promote self-acceptance.    Intervention(s)  Therapist will assign homework related to target objective with the intent to promote pt autonomy and better manage MH.  Facilitate increase in self-awareness  Provide psycho-education on self-compassion and anticipatory grief and avoidance  Teach/promote utilization of self-care/acceptance and processing through internal-experiences    Patient has reviewed and agreed to the above plan.      Keaton Stein, [facility]  [facility]  March 4, 2022

## 2023-06-22 ENCOUNTER — VIRTUAL VISIT (OUTPATIENT)
Dept: BEHAVIORAL HEALTH | Facility: CLINIC | Age: 23
End: 2023-06-22
Payer: COMMERCIAL

## 2023-06-22 DIAGNOSIS — F43.23 ADJUSTMENT DISORDER WITH MIXED ANXIETY AND DEPRESSED MOOD: Primary | ICD-10-CM

## 2023-06-22 PROCEDURE — 90837 PSYTX W PT 60 MINUTES: CPT | Mod: VID | Performed by: COUNSELOR

## 2023-06-22 NOTE — PROGRESS NOTES
[facility]                                      Progress Note    Patient Name: Jamari Cota  Date: 6/22/23         Service Type: Individual      Session Start Time: 2:00 pm  Session End Time: 2:59 pm     Session Length: 59 minutes    Session #: 20    Attendees: Client attended alone    Service Modality:  Video Visit:      Provider verified identity through the following two step process.  Patient provided:  Patient is known previously to provider    Telemedicine Visit: The patient's condition can be safely assessed and treated via synchronous audio and visual telemedicine encounter.      Reason for Telemedicine Visit: Services only offered telehealth    Originating Site (Patient Location): Patient's home     Distant Site (Provider Location): Provider Remote Setting- Home Office    Consent:  The patient/guardian has verbally consented to: the potential risks and benefits of telemedicine (video visit) versus in person care; bill my insurance or make self-payment for services provided; and responsibility for payment of non-covered services.     Patient would like the video invitation sent by:  My Chart    Mode of Communication:  Video Conference via [employer]    As the provider I attest to compliance with applicable laws and regulations related to telemedicine.    DATA  Interactive Complexity: No  Crisis: No   Extended Session (53+ minutes): PROLONGED SERVICE IN THE OUTPATIENT SETTING REQUIRING DIRECT (FACE-TO-FACE) PATIENT CONTACT BEYOND THE USUAL SERVICE:    - Longer session due to limited access to mental health appointments and necessity to address patient's distress / complexity    - Patient's presenting concerns require more intensive intervention than could be completed within the usual service      Progress Since Last Session (Related to Symptoms / Goals / Homework):   Symptoms: Worsening symptoms, pt expressed feeling more down recently     Homework: Partially completed      Episode of  "Care Goals: Satisfactory progress - PREPARATION (Decided to change - considering how); Intervened by negotiating a change plan and determining options / strategies for behavior change, identifying triggers, exploring social supports, and working towards setting a date to begin behavior change     Current / Ongoing Stressors and Concerns:   Pt is currently seeking therapy related to depression and anxiety around self-exploration related to his bi-sexual sexual identity and has recent come out, which is something he is still navigating. Pt grew up in a very conservative [employer] home, where same-sex relations were not supported, which has caused him to have an internal struggle with being his authentic self. Since last session, pt reported that he went on vacation with his mom's side of the family and that trip went well overall. Pt has taken some extra time off for himself, so he can focus on destressing after the trip before he needs to return to work. Pt processed through doubts he has been having about his ability to effectively utilize the skills he has learned in therapy or engage in therapy. Pt verbalized how he commonly feels more sad/down then not and is unsure if it is because he is doing something wrong. After discussing it, pt agreed that a big portion of it comes from feeling like he is always at odds or forced in \"competition\" with others, which he really doesn't like. Pt expressed how this results in him seeing most interactions through a negative though filter, and causes him to question if it is worth the energy to engage with others or in activities. Psycho-ed related to re-framing and narrative skills, including how he can choose to interact with others in a positive way through modeling. Pt was receptive to this idea and agreed that he needs to work on re-framing his role in his interactions with others, much like he does with his work. Pt was also encouraged to work on meeting more people and " "taking \"social risks\" in trying to go to more novel social activities. Session took longer then anticipated due to the presenting concerns needing more time to process through then usual and lack of recent mental health appointments.     Current coping skills-  Music  Breathing technique  D8 pen (vape) - unhelpful  Eating - unhelpful  Socializing  Reading to escape (games books dnd) - unhelpful in excess   Exercising  Singing (car singing)  Staying busy at work a lot with low-priority or off tasks - unhelpful    Look up LGBT people who are surprising to pt, and bring 3 examples - openly singh [employer] member, music artists, j[employer] channel: middle ground   Make list of 5 potential jobs or job fields you may want to go into - doesn't feel he has the confidence to apply for some jobs due to internalizing some feelings of past failure.      Treatment Objective(s) Addressed in This Session:   identify current thoughts, triggers, and stressors which contribute to feelings of depression  identify unhelpful or unrealistic fears / thoughts that contribute to feeling anxious  Increase interest, engagement, and pleasure in doing things  Decrease frequency and intensity of feeling down, depressed, hopeless  Feel less tired and more energy during the day   Identify negative self-talk and behaviors: challenge core beliefs, myths, and actions  identify coping strategies for improving mood  identify two areas of life that you would like to have improved functioning     Intervention:   CBT: Discussed how he frames interactions and if that is helpful vs unhelpful   Psychodynamic: Processed internal-experiences related to feelings of inherent conflict/competiton in social interactions and at work   Solution Focused: Identified areas in which pt is self-imposing barriers and possible strategies to overcome them.    Motivational Interviewing    MI Intervention: Expressed Empathy/Understanding, Supported Autonomy, Collaboration, " Evocation, Open-ended questions, Rolled with resistance: Emphasized patient autonomy, Simple reflection, Shifted topic to defuse resistance, Reframed sustain talk in the direction of change and Evoked patient agenda, Change talk (evoked) and Reframe     Change Talk Expressed by the Patient: Desire to change Ability to change Reasons to change Committment to change Activation Taking steps    Provider Response to Change Talk: E - Evoked more info from patient about behavior change, A - Affirmed patient's thoughts, decisions, or attempts at behavior change, R - Reflected patient's change talk and S - Summarized patient's change talk statements        Assessments completed prior to visit:  PHQ2:       3/30/2023     7:11 AM 3/30/2023     7:10 AM 9/2/2022    10:43 AM 9/2/2022    10:37 AM 7/22/2022     1:00 PM 7/15/2022     1:03 PM 7/1/2022    12:58 PM   PHQ-2 ( 1999 [employer])   Q1: Little interest or pleasure in doing things 1 1 0 0 1 1 1   Q2: Feeling down, depressed or hopeless 1 1 0 0 1 1 1   PHQ-2 Score 2 2 0 0 2 2 2   Q1: Little interest or pleasure in doing things Several days    Several days   Not at all Several days Several days Several days   Q2: Feeling down, depressed or hopeless Several days    Several days   Not at all Several days Several days Several days   PHQ-2 Score 2    2   0 2 2 2     PHQ9:       7/13/2015    11:20 AM 12/3/2021     7:36 AM 1/24/2022    12:06 PM 2/21/2022    12:42 PM 9/2/2022    10:43 AM   PHQ-9 SCORE   PHQ-9 Total Score 17       PHQ-9 Total Score MyChart  6 (Mild depression)      PHQ-9 Total Score  6 8 6 1     GAD2:       12/14/2021     7:43 AM 3/18/2022    11:00 AM 6/17/2022     1:02 PM 7/1/2022    12:58 PM 7/15/2022     1:04 PM 5/31/2023    12:23 PM   MILAD-2   Feeling nervous, anxious, or on edge 1 1 1    1 1 1    1 2   Not being able to stop or control worrying 1 1 1    1 1 1    1 1   MILAD-2 Total Score 2 2 2    2 2 2    2 3     GAD7:       12/3/2021     7:37 AM 1/24/2022    12:06 PM  2/21/2022    12:42 PM 9/2/2022    10:43 AM 5/31/2023    12:23 PM   MILAD-7 SCORE   Total Score 5 (mild anxiety)    9 (mild anxiety)   Total Score 5 7 8 1 9     PROMIS 10-Global Health (only subscores and total score):       12/14/2021     7:44 AM 2/21/2022    12:42 PM 3/18/2022    11:01 AM 6/17/2022     1:02 PM 7/1/2022    12:59 PM 7/15/2022     1:05 PM 5/31/2023    12:25 PM   PROMIS-10 Scores Only   Global Mental Health Score 10 14 11 11    11 11 11    11 8   Global Physical Health Score 14 17 16 14    14 17 17    17 15   PROMIS TOTAL - SUBSCORES 24 31 27 25    25 28 28    28 23         ASSESSMENT: Current Emotional / Mental Status (status of significant symptoms):   Risk status (Self / Other harm or suicidal ideation)   Patient denies current fears or concerns for personal safety.   Patient denies current or recent suicidal ideation or behaviors.   Patient denies current or recent homicidal ideation or behaviors.   Patient denies current or recent self injurious behavior or ideation.   Patient denies other safety concerns.   Patient reports there has been no change in risk factors since their last session.     Patient reports there has been no change in protective factors since their last session.     Recommended that patient call 911 or go to the local ED should there be a change in any of these risk factors.     Appearance:   Appropriate    Eye Contact:   Good    Psychomotor Behavior: Normal    Attitude:   Cooperative  Interested Friendly Pleasant   Orientation:   All   Speech    Rate / Production: Normal/ Responsive Talkative    Volume:  Normal    Mood:    Anxious  Depressed  Normal   Affect:    Appropriate    Thought Content:  Clear    Thought Form:  Coherent  Logical    Insight:    Fair  and Intellectual Insight     Medication Review:   No changes to current psychiatric medication(s)     Medication Compliance:   Yes     Changes in Health Issues:   None reported     Chemical Use Review:   Substance Use: Chemical  use reviewed, no active concerns identified      Tobacco Use: No current tobacco use.      Diagnosis:  1. Adjustment disorder with mixed anxiety and depressed mood        Collateral Reports Completed:   Not Applicable    PLAN: (Patient Tasks / Therapist Tasks / Other)  Encourage self to engage in activities you want to try but are nervous to try  Review resources sent through [employer]  Continue to be more explicit in how you want people to help you when asking  Work on framing social interactions as waltzing and not fencing  Attempt to reduce avoidance of social interactions    [facility]                                                       ______________________________________________________________________    Individual Treatment Plan    Patient's Name: Jamari Cota  YOB: 2000    Date of Creation: 3/4/22  Date Treatment Plan Last Reviewed/Revised: 6/6/23    DSM5 Diagnoses: Adjustment Disorders  309.28 (F43.23) With mixed anxiety and depressed mood  Psychosocial / Contextual Factors: concerns about acceptance by family, pt has started dating, financial stressors  PROMIS (reviewed every 90 days):   PROMIS 10-Global Health (only subscores and total score):       12/14/2021     7:44 AM 2/21/2022    12:42 PM 3/18/2022    11:01 AM 6/17/2022     1:02 PM 7/1/2022    12:59 PM 7/15/2022     1:05 PM 5/31/2023    12:25 PM   PROMIS-10 Scores Only   Global Mental Health Score 10 14 11 11    11 11 11    11 8   Global Physical Health Score 14 17 16 14    14 17 17    17 15   PROMIS TOTAL - SUBSCORES 24 31 27 25    25 28 28    28 23       Referral / Collaboration:  Referral to another professional/service is not indicated at this time..    Anticipated number of session for this episode of care: 8-13  Anticipation frequency of session: Biweekly  Anticipated Duration of each session: 38-52 minutes  Treatment plan will be reviewed in 90 days or when goals have been changed.       MeasurableTreatment  Goal(s) related to diagnosis / functional impairment(s)  Goal 1: Patient will develop helpful coping skills, while learning to recognize unhelpful ones, and build up his inner strength.     I will know I've met my goal when I am more able to feel more confident in myself and decisions/presetation.     Objective #A (Patient Action)    Patient will identify helpful vs unhelpful coping strategies to more effectively address stressors  exercise for 30 minutes 3 times per week for 30 minutes  Decrease frequency and intensity of feeling down, depressed, hopeless  Improve diet, appetite, mindful eating, and / or meal planning  Identify negative self-talk and behaviors: challenge core beliefs, myths, and actions  identify 5 coping strategies for improving mood  practice one mindfulness skill each day for 5-10 minutes.  Status: Continued - Date(s):  6/6/23    Intervention(s)  Therapist will assign homework related to target objective with the intent to promote pt autonomy and better manage MH.  Facilitate increase in self-awareness  Provide psycho-education on mindfulness and helpful vs. unhelpful coping identification  Teach/promote utilization of coping skill development/exploration and mindfulness techniques    Objective #B Pt will learn how to develop and identify a helpful support system.  Patient will participate in social or interpersonal activities to improve mood  attend and participate in social or recreational activities to expand social network/support  Increase interest, engagement, and pleasure in doing things  Identify negative self-talk and behaviors: challenge core beliefs, myths, and actions  track and record at least 3 pleasant exchanges with friends and new people.  Status: Deferred - Date: 6/6/23     Intervention(s)  Therapist will assign homework related to target objective with the intent to promote pt autonomy and better manage MH.  Facilitate increase in self-awareness  Provide psycho-education on  "social distortions/fallacies and boundaries  Teach/promote utilization of social engagement skills and problem solving    Goal 2: Patient will be more able to engage socially and feel less anxious when interacting with others more consistently.     I will know I've met my goal when I am able to be less stressed/anxious in social interactions.      Objective #A (Patient Action)    Status: Continued - Date(s): 6/6/23    Patient will identify and compliment positives at least 3 times daily to support positive self-image  identify unhelpful or unrealistic thoughts/concerns and stressors which contribute to feelings of anxiety  learn and demonstrate pro-social and communication assertiveness skill(s)  initiate 3 social contact(s) per day for increasing lengths of time  practice setting boundaries 3 times in the next several weeks  accept compliment with a \"thank you\" and no self deprecating comments.    Intervention(s)  Therapist will assign homework related to target objective with the intent to promote pt autonomy and better manage MH.  Facilitate increase in self-awareness  Provide psycho-education on social anxiety thinking and communication styles  Teach/promote utilization of thought challenging and self-confidence building    Goal 3: Patient will work on processing through self-identity and how to express his authentic or genuine self while navigating relationships, especially pre-existing ones.     I will know I've met my goal when I feel more comfortable being my authentic or genuine self with people I know.      Objective #A (Patient Action)  Status: Continued - Date(s): 6/6/23    Patient will identify communication strategies to more effectively address stressors  use thought-stopping strategy daily to reduce intrusive thoughts  Identify negative self-talk and behaviors: challenge core beliefs, myths, and actions  identify how the use of substances contributes to the avoidance of feeling associated with the " loss  identify the time in your life when you began to feel poorly about themselves.  Use positive self-affirmation daily to promote self-acceptance.    Intervention(s)  Therapist will assign homework related to target objective with the intent to promote pt autonomy and better manage MH.  Facilitate increase in self-awareness  Provide psycho-education on self-compassion and anticipatory grief and avoidance  Teach/promote utilization of self-care/acceptance and processing through internal-experiences    Patient has reviewed and agreed to the above plan.      Keaton Stein, [facility]  [facility]  March 4, 2022

## 2023-07-18 ENCOUNTER — VIRTUAL VISIT (OUTPATIENT)
Dept: PSYCHOLOGY | Facility: CLINIC | Age: 23
End: 2023-07-18
Payer: COMMERCIAL

## 2023-07-18 DIAGNOSIS — F43.23 ADJUSTMENT DISORDER WITH MIXED ANXIETY AND DEPRESSED MOOD: Primary | ICD-10-CM

## 2023-07-18 PROCEDURE — 90837 PSYTX W PT 60 MINUTES: CPT | Mod: VID | Performed by: COUNSELOR

## 2023-07-18 NOTE — PROGRESS NOTES
[facility]                                      Progress Note    Patient Name: Jamari Cota  Date: 7/18/23         Service Type: Individual      Session Start Time: 1:00 pm  Session End Time: 2:00 pm     Session Length: 60 minutes    Session #: 21    Attendees: Client attended alone    Service Modality:  Video Visit:      Provider verified identity through the following two step process.  Patient provided:  Patient is known previously to provider    Telemedicine Visit: The patient's condition can be safely assessed and treated via synchronous audio and visual telemedicine encounter.      Reason for Telemedicine Visit: Services only offered telehealth    Originating Site (Patient Location): Patient's home     Distant Site (Provider Location): [facility]    Consent:  The patient/guardian has verbally consented to: the potential risks and benefits of telemedicine (video visit) versus in person care; bill my insurance or make self-payment for services provided; and responsibility for payment of non-covered services.     Patient would like the video invitation sent by:  My Chart    Mode of Communication:  Video Conference via Amwell    As the provider I attest to compliance with applicable laws and regulations related to telemedicine.    Distant Location (provider location):  On-site    DATA  Interactive Complexity: No  Crisis: No   Extended Session (53+ minutes): PROLONGED SERVICE IN THE OUTPATIENT SETTING REQUIRING DIRECT (FACE-TO-FACE) PATIENT CONTACT BEYOND THE USUAL SERVICE:    - Patient's presenting concerns require more intensive intervention than could be completed within the usual service      Progress Since Last Session (Related to Symptoms / Goals / Homework):   Symptoms: Improving symptoms, pt is feeling more motivated and less anxious     Homework: Achieved / completed to satisfaction      Episode of Care Goals: Satisfactory progress - ACTION (Actively  working towards change); Intervened by reinforcing change plan / affirming steps taken     Current / Ongoing Stressors and Concerns:   Pt is currently seeking therapy related to depression and anxiety around self-exploration related to his bi-sexual sexual identity and has recent come out, which is something he is still navigating. Pt grew up in a very conservative Bahai home, where same-sex relations were not supported, which has caused him to have an internal struggle with being his authentic self. Since last session, pt reported that he has been doing well and that he has been busy in a good way. Pt has been keeping himself active with bike riding and getting out, though he would like to get himself back into a gym routine. Pt has really been trying to keep himself busy to stay engaged and not fall back into unhelpful behaviors, focusing on trying to push himself. Pt has an upcoming camping trip, where he will hope to meet some new people, which he is looking forward to. Pt has been using his breathing exercises to manage his anxiety and doing interoceptives when he can remember to. Pt has been dating as well again and has recently had a difficult conversation with them about physical intimacy and needing to take things slow. Pt felt that he handled this conversation well, especially as it was anxiety provoking, and his boyfriend was receptive to it. Pt was able to verbalize that this person is very physically focused but also all about consent, which has been a good  and a different experience compared to previous attempts at dating. Pt has been doing better with not engaging in self-deprication, especially around getting compliments, which has sometime been hard. Overall pt is able to see progress he is making, though he is feeling anxious about falling back into a funk as he thinks it is a cycle. After discussing it, pt did acknowledge that he has improved more after each down swing and he is  getting better at managing them when they do happen. Session took longer then anticipated due to the presenting concerns needing more time to process through then usual.    Current coping skills-  Music  Breathing technique  D8 pen (vape) - unhelpful  Eating - unhelpful  Socializing  Reading to escape (games books dnd) - unhelpful in excess   Exercising  Singing (car singing)  Staying busy at work a lot with low-priority or off tasks - unhelpful    Look up LGBT people who are surprising to pt, and bring 3 examples - openly singh [employer] member, music artists, [employer] channel: middle ground   Make list of 5 potential jobs or job fields you may want to go into - doesn't feel he has the confidence to apply for some jobs due to internalizing some feelings of past failure.      Treatment Objective(s) Addressed in This Session:   identify current thoughts, triggers, and stressors which contribute to feelings of depression  identify unhelpful or unrealistic fears / thoughts that contribute to feeling anxious  Increase interest, engagement, and pleasure in doing things  Decrease frequency and intensity of feeling down, depressed, hopeless  Feel less tired and more energy during the day   Identify negative self-talk and behaviors: challenge core beliefs, myths, and actions  identify coping strategies for improving mood  identify two areas of life that you would like to have improved functioning     Intervention:   CBT: Discussed how he frames interactions and if that is helpful vs unhelpful   Psychodynamic: Processed internal-experiences related to feelings of inherent conflict/competiton in social interactions and at work   Solution Focused: Identified areas in which pt is self-imposing barriers and possible strategies to overcome them.    Motivational Interviewing    MI Intervention: Expressed Empathy/Understanding, Supported Autonomy, Collaboration, Evocation, Open-ended questions, Reflections: simple and complex,  Change talk (evoked) and Reframe     Change Talk Expressed by the Patient: Ability to change Reasons to change Committment to change Activation Taking steps    Provider Response to Change Talk: E - Evoked more info from patient about behavior change, A - Affirmed patient's thoughts, decisions, or attempts at behavior change, R - Reflected patient's change talk and S - Summarized patient's change talk statements        Assessments completed prior to visit:  PHQ2:       3/30/2023     7:11 AM 3/30/2023     7:10 AM 9/2/2022    10:43 AM 9/2/2022    10:37 AM 7/22/2022     1:00 PM 7/15/2022     1:03 PM 7/1/2022    12:58 PM   PHQ-2 ( 1999 [employer])   Q1: Little interest or pleasure in doing things 1 1 0 0 1 1 1   Q2: Feeling down, depressed or hopeless 1 1 0 0 1 1 1   PHQ-2 Score 2 2 0 0 2 2 2   Q1: Little interest or pleasure in doing things Several days    Several days   Not at all Several days Several days Several days   Q2: Feeling down, depressed or hopeless Several days    Several days   Not at all Several days Several days Several days   PHQ-2 Score 2    2   0 2 2 2     PHQ9:       7/13/2015    11:20 AM 12/3/2021     7:36 AM 1/24/2022    12:06 PM 2/21/2022    12:42 PM 9/2/2022    10:43 AM   PHQ-9 SCORE   PHQ-9 Total Score 17       PHQ-9 Total Score MyChart  6 (Mild depression)      PHQ-9 Total Score  6 8 6 1     GAD2:       12/14/2021     7:43 AM 3/18/2022    11:00 AM 6/17/2022     1:02 PM 7/1/2022    12:58 PM 7/15/2022     1:04 PM 5/31/2023    12:23 PM   MILAD-2   Feeling nervous, anxious, or on edge 1 1 1    1 1 1    1 2   Not being able to stop or control worrying 1 1 1    1 1 1    1 1   MILAD-2 Total Score 2 2 2    2 2 2    2 3     GAD7:       12/3/2021     7:37 AM 1/24/2022    12:06 PM 2/21/2022    12:42 PM 9/2/2022    10:43 AM 5/31/2023    12:23 PM   MILAD-7 SCORE   Total Score 5 (mild anxiety)    9 (mild anxiety)   Total Score 5 7 8 1 9     PROMIS 10-Global Health (only subscores and total score):       12/14/2021      7:44 AM 2/21/2022    12:42 PM 3/18/2022    11:01 AM 6/17/2022     1:02 PM 7/1/2022    12:59 PM 7/15/2022     1:05 PM 5/31/2023    12:25 PM   PROMIS-10 Scores Only   Global Mental Health Score 10 14 11 11    11 11 11    11 8   Global Physical Health Score 14 17 16 14    14 17 17    17 15   PROMIS TOTAL - SUBSCORES 24 31 27 25    25 28 28    28 23         ASSESSMENT: Current Emotional / Mental Status (status of significant symptoms):   Risk status (Self / Other harm or suicidal ideation)   Patient denies current fears or concerns for personal safety.   Patient denies current or recent suicidal ideation or behaviors.   Patient denies current or recent homicidal ideation or behaviors.   Patient denies current or recent self injurious behavior or ideation.   Patient denies other safety concerns.   Patient reports there has been no change in risk factors since their last session.     Patient reports there has been no change in protective factors since their last session.     Recommended that patient call 911 or go to the local ED should there be a change in any of these risk factors.     Appearance:   Appropriate    Eye Contact:   Good    Psychomotor Behavior: Normal    Attitude:   Cooperative  Interested Friendly Pleasant   Orientation:   All   Speech    Rate / Production: Normal/ Responsive Talkative    Volume:  Normal    Mood:    Anxious  Normal   Affect:    Appropriate    Thought Content:  Clear    Thought Form:  Coherent  Logical    Insight:    Good  and Intellectual Insight     Medication Review:   No changes to current psychiatric medication(s)     Medication Compliance:   Yes     Changes in Health Issues:   None reported     Chemical Use Review:   Substance Use: Chemical use reviewed, no active concerns identified      Tobacco Use: No current tobacco use.      Diagnosis:  1. Adjustment disorder with mixed anxiety and depressed mood        Collateral Reports Completed:   Not Applicable    PLAN: (Patient Tasks /  Therapist Tasks / Other)  Keep encouraging self to engage in activities you want to try but are nervous to try  Practice non-sexual touch with partner  Continue communication with partner  Identify social interactions as waltzing or fencing, and how you can frame them this way  Practice thought/feeling acknowledgment and then refocusing on current task    Keaton Stein [facility]                                                       ______________________________________________________________________    Individual Treatment Plan    Patient's Name: Jamari Cota  YOB: 2000    Date of Creation: 3/4/22  Date Treatment Plan Last Reviewed/Revised: 6/6/23    DSM5 Diagnoses: Adjustment Disorders  309.28 (F43.23) With mixed anxiety and depressed mood  Psychosocial / Contextual Factors: concerns about acceptance by family, pt has started dating, financial stressors  PROMIS (reviewed every 90 days):   PROMIS 10-Global Health (only subscores and total score):       12/14/2021     7:44 AM 2/21/2022    12:42 PM 3/18/2022    11:01 AM 6/17/2022     1:02 PM 7/1/2022    12:59 PM 7/15/2022     1:05 PM 5/31/2023    12:25 PM   PROMIS-10 Scores Only   Global Mental Health Score 10 14 11 11    11 11 11    11 8   Global Physical Health Score 14 17 16 14    14 17 17    17 15   PROMIS TOTAL - SUBSCORES 24 31 27 25    25 28 28    28 23       Referral / Collaboration:  Referral to another professional/service is not indicated at this time..    Anticipated number of session for this episode of care: 8-13  Anticipation frequency of session: Biweekly  Anticipated Duration of each session: 38-52 minutes  Treatment plan will be reviewed in 90 days or when goals have been changed.       MeasurableTreatment Goal(s) related to diagnosis / functional impairment(s)  Goal 1: Patient will develop helpful coping skills, while learning to recognize unhelpful ones, and build up his inner strength.     I will know I've met my goal when I am  more able to feel more confident in myself and decisions/presetation.     Objective #A (Patient Action)    Patient will identify helpful vs unhelpful coping strategies to more effectively address stressors  exercise for 30 minutes 3 times per week for 30 minutes  Decrease frequency and intensity of feeling down, depressed, hopeless  Improve diet, appetite, mindful eating, and / or meal planning  Identify negative self-talk and behaviors: challenge core beliefs, myths, and actions  identify 5 coping strategies for improving mood  practice one mindfulness skill each day for 5-10 minutes.  Status: Continued - Date(s):  6/6/23    Intervention(s)  Therapist will assign homework related to target objective with the intent to promote pt autonomy and better manage MH.  Facilitate increase in self-awareness  Provide psycho-education on mindfulness and helpful vs. unhelpful coping identification  Teach/promote utilization of coping skill development/exploration and mindfulness techniques    Objective #B Pt will learn how to develop and identify a helpful support system.  Patient will participate in social or interpersonal activities to improve mood  attend and participate in social or recreational activities to expand social network/support  Increase interest, engagement, and pleasure in doing things  Identify negative self-talk and behaviors: challenge core beliefs, myths, and actions  track and record at least 3 pleasant exchanges with friends and new people.  Status: Deferred - Date: 6/6/23     Intervention(s)  Therapist will assign homework related to target objective with the intent to promote pt autonomy and better manage MH.  Facilitate increase in self-awareness  Provide psycho-education on social distortions/fallacies and boundaries  Teach/promote utilization of social engagement skills and problem solving    Goal 2: Patient will be more able to engage socially and feel less anxious when interacting with others more  "consistently.     I will know I've met my goal when I am able to be less stressed/anxious in social interactions.      Objective #A (Patient Action)    Status: Continued - Date(s): 6/6/23    Patient will identify and compliment positives at least 3 times daily to support positive self-image  identify unhelpful or unrealistic thoughts/concerns and stressors which contribute to feelings of anxiety  learn and demonstrate pro-social and communication assertiveness skill(s)  initiate 3 social contact(s) per day for increasing lengths of time  practice setting boundaries 3 times in the next several weeks  accept compliment with a \"thank you\" and no self deprecating comments.    Intervention(s)  Therapist will assign homework related to target objective with the intent to promote pt autonomy and better manage MH.  Facilitate increase in self-awareness  Provide psycho-education on social anxiety thinking and communication styles  Teach/promote utilization of thought challenging and self-confidence building    Goal 3: Patient will work on processing through self-identity and how to express his authentic or genuine self while navigating relationships, especially pre-existing ones.     I will know I've met my goal when I feel more comfortable being my authentic or genuine self with people I know.      Objective #A (Patient Action)  Status: Continued - Date(s): 6/6/23    Patient will identify communication strategies to more effectively address stressors  use thought-stopping strategy daily to reduce intrusive thoughts  Identify negative self-talk and behaviors: challenge core beliefs, myths, and actions  identify how the use of substances contributes to the avoidance of feeling associated with the loss  identify the time in your life when you began to feel poorly about themselves.  Use positive self-affirmation daily to promote self-acceptance.    Intervention(s)  Therapist will assign homework related to target objective with " the intent to promote pt autonomy and better manage MH.  Facilitate increase in self-awareness  Provide psycho-education on self-compassion and anticipatory grief and avoidance  Teach/promote utilization of self-care/acceptance and processing through internal-experiences    Patient has reviewed and agreed to the above plan.      Keaton Stein, [facility]  [facility]  March 4, 2022

## 2023-08-01 ENCOUNTER — VIRTUAL VISIT (OUTPATIENT)
Dept: PSYCHOLOGY | Facility: CLINIC | Age: 23
End: 2023-08-01
Payer: COMMERCIAL

## 2023-08-01 DIAGNOSIS — F43.23 ADJUSTMENT DISORDER WITH MIXED ANXIETY AND DEPRESSED MOOD: Primary | ICD-10-CM

## 2023-08-01 PROCEDURE — 90832 PSYTX W PT 30 MINUTES: CPT | Mod: VID | Performed by: COUNSELOR

## 2023-08-01 NOTE — PROGRESS NOTES
[facility]                                      Progress Note    Patient Name: Jamari Cota  Date: 8/01/23         Service Type: Individual      Session Start Time: 1:00 pm  Session End Time: 1:27 pm     Session Length: 27 minutes    Session #: 22    Attendees: Client attended alone    Service Modality:  Video Visit:      Provider verified identity through the following two step process.  Patient provided:  Patient is known previously to provider    Telemedicine Visit: The patient's condition can be safely assessed and treated via synchronous audio and visual telemedicine encounter.      Reason for Telemedicine Visit: Services only offered telehealth    Originating Site (Patient Location): Patient's home     Distant Site (Provider Location): [facility]    Consent:  The patient/guardian has verbally consented to: the potential risks and benefits of telemedicine (video visit) versus in person care; bill my insurance or make self-payment for services provided; and responsibility for payment of non-covered services.     Patient would like the video invitation sent by:  My Chart    Mode of Communication:  Video Conference via Amwell    As the provider I attest to compliance with applicable laws and regulations related to telemedicine.    Distant Location (provider location):  On-site    DATA  Interactive Complexity: No  Crisis: No   Extended Session (53+ minutes): No      Progress Since Last Session (Related to Symptoms / Goals / Homework):   Symptoms: Worsening symptom, pt is feeling more depressed      Homework: Did not complete      Episode of Care Goals: Minimal progress - CONTEMPLATION (Considering change and yet undecided); Intervened by assessing the negative and positive thinking (ambivalence) about behavior change     Current / Ongoing Stressors and Concerns:   NEXT TIME: Consider doing formal interoceptives and discuss medication.      Pt is currently seeking therapy related to depression and anxiety around self-exploration related to his bi-sexual sexual identity and has recent come out, which is something he is still navigating. Pt grew up in a very conservative Orthodox home, where same-sex relations were not supported, which has caused him to have an internal struggle with being his authentic self. Since last session, pt reported that he continues to have the same challenges with his anxiety and that his awareness of it is improved, but the intensity of it hasn't really improved any. Pt has been trying to stay busy to focus on being in the moment and prevent himself from over thinking, as that tends to be unhelpful. Pt confirmed went on the camping trip, which went well and yet he didn't feel like he made any new connections. Pt did acknowledge that he had a good time, and he did about as he could do with trying to engage with new people. Pt has noticed that he has started to feel like he wants to isolate, and thinks that his depressive cycle is restarting. Pt expressed being unsure why this is happening and wasn't able to identify any trigger. Pt verbalized how the most frustrating part is that when he gets this way, he struggles with communication which makes it all feel worse, as he can't effectively talk to others. Pt expressed frustration with his work and feeling like he is unfit for his job, though his manager has told him he is doing a good job. Pt is overall sadness that he can't get himself out of this funk or identify why it is happening, becoming tearful at the end of session. Pt ended session early saying he didn't think he had the energy today to continue and wasn't feeling good. Pt agreed to check-in with himself and try to identify helpful vs unhelpful behaviors when he is feeling this way.     Current coping skills-  Music  Breathing technique  D8 pen (vape) - unhelpful  Eating - unhelpful  Socializing  Reading to escape  (games books dnd) - unhelpful in excess   Exercising  Singing (car singing)  Staying busy at work a lot with low-priority or off tasks - unhelpful    Look up LGBT people who are surprising to pt, and bring 3 examples - openly singh [employer] member, music artists, [employer]: middle ground   Make list of 5 potential jobs or job fields you may want to go into - doesn't feel he has the confidence to apply for some jobs due to internalizing some feelings of past failure.      Treatment Objective(s) Addressed in This Session:   identify current thoughts, triggers, and stressors which contribute to feelings of depression  identify unhelpful or unrealistic fears / thoughts that contribute to feeling anxious  Increase interest, engagement, and pleasure in doing things  Decrease frequency and intensity of feeling down, depressed, hopeless  Feel less tired and more energy during the day   Identify negative self-talk and behaviors: challenge core beliefs, myths, and actions  identify coping strategies for improving mood  identify two areas of life that you would like to have improved functioning     Intervention:   CBT: Discussed how he frames interactions and if that is helpful vs unhelpful   Psychodynamic: Processed internal-experiences related to feelings of inherent conflict/competiton in social interactions and at work   Solution Focused: Identified areas in which pt is self-imposing barriers and possible strategies to overcome them.    Motivational Interviewing    MI Intervention: Expressed Empathy/Understanding, Supported Autonomy, Collaboration, Evocation, Open-ended questions, and Reflections: simple and complex     Change Talk Expressed by the Patient: Desire to change Reasons to change Committment to change    Provider Response to Change Talk: E - Evoked more info from patient about behavior change, A - Affirmed patient's thoughts, decisions, or attempts at behavior change, R - Reflected patient's change talk,  and S - Summarized patient's change talk statements        Assessments completed prior to visit:  PHQ2:       7/18/2023    12:57 PM 3/30/2023     7:11 AM 3/30/2023     7:10 AM 9/2/2022    10:43 AM 9/2/2022    10:37 AM 7/22/2022     1:00 PM 7/15/2022     1:03 PM   PHQ-2 ( 1999 [employer])   Q1: Little interest or pleasure in doing things 1 1 1 0 0 1 1   Q2: Feeling down, depressed or hopeless 1 1 1 0 0 1 1   PHQ-2 Score 2 2 2 0 0 2 2   Q1: Little interest or pleasure in doing things Several days Several days    Several days   Not at all Several days Several days   Q2: Feeling down, depressed or hopeless Several days Several days    Several days   Not at all Several days Several days   PHQ-2 Score 2 2    2   0 2 2     PHQ9:       7/13/2015    11:20 AM 12/3/2021     7:36 AM 1/24/2022    12:06 PM 2/21/2022    12:42 PM 9/2/2022    10:43 AM   PHQ-9 SCORE   PHQ-9 Total Score 17       PHQ-9 Total Score MyChart  6 (Mild depression)      PHQ-9 Total Score  6 8 6 1     GAD2:       12/14/2021     7:43 AM 3/18/2022    11:00 AM 6/17/2022     1:02 PM 7/1/2022    12:58 PM 7/15/2022     1:04 PM 5/31/2023    12:23 PM   MILAD-2   Feeling nervous, anxious, or on edge 1 1 1    1 1 1    1 2   Not being able to stop or control worrying 1 1 1    1 1 1    1 1   MILAD-2 Total Score 2 2 2    2 2 2    2 3     GAD7:       12/3/2021     7:37 AM 1/24/2022    12:06 PM 2/21/2022    12:42 PM 9/2/2022    10:43 AM 5/31/2023    12:23 PM   MILAD-7 SCORE   Total Score 5 (mild anxiety)    9 (mild anxiety)   Total Score 5 7 8 1 9     PROMIS 10-Global Health (only subscores and total score):       12/14/2021     7:44 AM 2/21/2022    12:42 PM 3/18/2022    11:01 AM 6/17/2022     1:02 PM 7/1/2022    12:59 PM 7/15/2022     1:05 PM 5/31/2023    12:25 PM   PROMIS-10 Scores Only   Global Mental Health Score 10 14 11 11    11 11 11    11 8   Global Physical Health Score 14 17 16 14    14 17 17    17 15   PROMIS TOTAL - SUBSCORES 24 31 27 25    25 28 28    28 23          ASSESSMENT: Current Emotional / Mental Status (status of significant symptoms):   Risk status (Self / Other harm or suicidal ideation)   Patient denies current fears or concerns for personal safety.   Patient denies current or recent suicidal ideation or behaviors.   Patient denies current or recent homicidal ideation or behaviors.   Patient denies current or recent self injurious behavior or ideation.   Patient denies other safety concerns.   Patient reports there has been no change in risk factors since their last session.     Patient reports there has been no change in protective factors since their last session.     Recommended that patient call 911 or go to the local ED should there be a change in any of these risk factors.     Appearance:   Appropriate    Eye Contact:   Poor   Psychomotor Behavior: Normal    Attitude:   Cooperative  Friendly Pleasant   Orientation:   All   Speech    Rate / Production: Normal/ Responsive    Volume:  Normal    Mood:    Depressed  Normal   Affect:    Appropriate  Blunted  Flat  Tearful   Thought Content:  Clear    Thought Form:  Coherent  Logical    Insight:    Fair      Medication Review:   No changes to current psychiatric medication(s)     Medication Compliance:   Yes     Changes in Health Issues:   None reported     Chemical Use Review:   Substance Use: Chemical use reviewed, no active concerns identified      Tobacco Use: No current tobacco use.      Diagnosis:  1. Adjustment disorder with mixed anxiety and depressed mood          Collateral Reports Completed:   Not Applicable    PLAN: (Patient Tasks / Therapist Tasks / Other)  Check-in with self about helpful vs unhelpful behavior  Take a mental health day (extra credit)  Practice non-sexual touch with partner  Continue to practice communication with partner even when feeling sad/depressed      Keaton Stein [facility]                                                        ______________________________________________________________________    Individual Treatment Plan    Patient's Name: Jamari Cota  YOB: 2000    Date of Creation: 3/4/22  Date Treatment Plan Last Reviewed/Revised: 6/6/23    DSM5 Diagnoses: Adjustment Disorders  309.28 (F43.23) With mixed anxiety and depressed mood  Psychosocial / Contextual Factors: concerns about acceptance by family, pt has started dating, financial stressors  PROMIS (reviewed every 90 days):   PROMIS 10-Global Health (only subscores and total score):       12/14/2021     7:44 AM 2/21/2022    12:42 PM 3/18/2022    11:01 AM 6/17/2022     1:02 PM 7/1/2022    12:59 PM 7/15/2022     1:05 PM 5/31/2023    12:25 PM   PROMIS-10 Scores Only   Global Mental Health Score 10 14 11 11    11 11 11    11 8   Global Physical Health Score 14 17 16 14    14 17 17    17 15   PROMIS TOTAL - SUBSCORES 24 31 27 25    25 28 28    28 23       Referral / Collaboration:  Referral to another professional/service is not indicated at this time..    Anticipated number of session for this episode of care: 8-13  Anticipation frequency of session: Biweekly  Anticipated Duration of each session: 38-52 minutes  Treatment plan will be reviewed in 90 days or when goals have been changed.       MeasurableTreatment Goal(s) related to diagnosis / functional impairment(s)  Goal 1: Patient will develop helpful coping skills, while learning to recognize unhelpful ones, and build up his inner strength.     I will know I've met my goal when I am more able to feel more confident in myself and decisions/presetation.     Objective #A (Patient Action)    Patient will identify helpful vs unhelpful coping strategies to more effectively address stressors  exercise for 30 minutes 3 times per week for 30 minutes  Decrease frequency and intensity of feeling down, depressed, hopeless  Improve diet, appetite, mindful eating, and / or meal planning  Identify negative self-talk and  behaviors: challenge core beliefs, myths, and actions  identify 5 coping strategies for improving mood  practice one mindfulness skill each day for 5-10 minutes.  Status: Continued - Date(s):  6/6/23    Intervention(s)  Therapist will assign homework related to target objective with the intent to promote pt autonomy and better manage MH.  Facilitate increase in self-awareness  Provide psycho-education on mindfulness and helpful vs. unhelpful coping identification  Teach/promote utilization of coping skill development/exploration and mindfulness techniques    Objective #B Pt will learn how to develop and identify a helpful support system.  Patient will participate in social or interpersonal activities to improve mood  attend and participate in social or recreational activities to expand social network/support  Increase interest, engagement, and pleasure in doing things  Identify negative self-talk and behaviors: challenge core beliefs, myths, and actions  track and record at least 3 pleasant exchanges with friends and new people .  Status: Deferred - Date: 6/6/23      Intervention(s)  Therapist will assign homework related to target objective with the intent to promote pt autonomy and better manage MH.  Facilitate increase in self-awareness  Provide psycho-education on social distortions/fallacies and boundaries  Teach/promote utilization of social engagement skills and problem solving    Goal 2: Patient will be more able to engage socially and feel less anxious when interacting with others more consistently.     I will know I've met my goal when I am able to be less stressed/anxious in social interactions.      Objective #A (Patient Action)    Status: Continued - Date(s): 6/6/23    Patient will identify and compliment positives at least 3 times daily to support positive self-image  identify unhelpful or unrealistic thoughts/concerns and stressors which contribute to feelings of anxiety  learn and demonstrate  "pro-social and communication assertiveness skill(s)  initiate 3 social contact(s) per day for increasing lengths of time  practice setting boundaries 3 times in the next several weeks  accept compliment with a \"thank you\" and no self deprecating comments.    Intervention(s)  Therapist will assign homework related to target objective with the intent to promote pt autonomy and better manage MH.  Facilitate increase in self-awareness  Provide psycho-education on social anxiety thinking and communication styles  Teach/promote utilization of thought challenging and self-confidence building    Goal 3: Patient will work on processing through self-identity and how to express his authentic or genuine self while navigating relationships, especially pre-existing ones.     I will know I've met my goal when I feel more comfortable being my authentic or genuine self with people I know.      Objective #A (Patient Action)  Status: Continued - Date(s): 6/6/23    Patient will identify communication strategies to more effectively address stressors  use thought-stopping strategy daily to reduce intrusive thoughts  Identify negative self-talk and behaviors: challenge core beliefs, myths, and actions  identify how the use of substances contributes to the avoidance of feeling associated with the loss  identify the time in your life when you began to feel poorly about themselves.  Use positive self-affirmation daily to promote self-acceptance.    Intervention(s)  Therapist will assign homework related to target objective with the intent to promote pt autonomy and better manage MH.  Facilitate increase in self-awareness  Provide psycho-education on self-compassion and anticipatory grief and avoidance  Teach/promote utilization of self-care/acceptance and processing through internal-experiences    Patient has reviewed and agreed to the above plan.      Keaton Stein [facility]  March 4, 2022  "

## 2023-08-18 ENCOUNTER — VIRTUAL VISIT (OUTPATIENT)
Dept: PSYCHOLOGY | Facility: CLINIC | Age: 23
End: 2023-08-18
Payer: COMMERCIAL

## 2023-08-18 DIAGNOSIS — F43.23 ADJUSTMENT DISORDER WITH MIXED ANXIETY AND DEPRESSED MOOD: Primary | ICD-10-CM

## 2023-08-18 PROCEDURE — 90837 PSYTX W PT 60 MINUTES: CPT | Mod: VID | Performed by: COUNSELOR
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
## 2023-08-29 ENCOUNTER — VIRTUAL VISIT (OUTPATIENT)
Dept: PSYCHOLOGY | Facility: CLINIC | Age: 23
End: 2023-08-29
Payer: COMMERCIAL

## 2023-08-29 DIAGNOSIS — F43.23 ADJUSTMENT DISORDER WITH MIXED ANXIETY AND DEPRESSED MOOD: Primary | ICD-10-CM

## 2023-08-29 PROCEDURE — 90837 PSYTX W PT 60 MINUTES: CPT | Mod: VID | Performed by: COUNSELOR

## 2023-08-29 NOTE — PROGRESS NOTES
[facility]                                      Progress Note    Patient Name: Jamari Cota  Date: 8/29/23         Service Type: Individual      Session Start Time: 1:00 pm  Session End Time: 2:00 pm     Session Length: 60 minutes    Session #: 24    Attendees: Client attended alone    Service Modality:  Video Visit:      Provider verified identity through the following two step process.  Patient provided:  Patient is known previously to provider    Telemedicine Visit: The patient's condition can be safely assessed and treated via synchronous audio and visual telemedicine encounter.      Reason for Telemedicine Visit: Services only offered telehealth    Originating Site (Patient Location): Patient's home     Distant Site (Provider Location): Provider Remote Setting- Home Office    Consent:  The patient/guardian has verbally consented to: the potential risks and benefits of telemedicine (video visit) versus in person care; bill my insurance or make self-payment for services provided; and responsibility for payment of non-covered services.     Patient would like the video invitation sent by:  My Chart    Mode of Communication:  Video Conference via Amwell    As the provider I attest to compliance with applicable laws and regulations related to telemedicine.    Distant Location (provider location):  Off-site    DATA  Interactive Complexity: No  Crisis: No   Extended Session (53+ minutes): PROLONGED SERVICE IN THE OUTPATIENT SETTING REQUIRING DIRECT (FACE-TO-FACE) PATIENT CONTACT BEYOND THE USUAL SERVICE:    - Patient's presenting concerns require more intensive intervention than could be completed within the usual service      Progress Since Last Session (Related to Symptoms / Goals / Homework):   Symptoms: Improving symptoms      Homework: Partially completed      Episode of Care Goals: Satisfactory progress - ACTION (Actively working towards change); Intervened by reinforcing change plan /  "affirming steps taken     Current / Ongoing Stressors and Concerns:  Pt is currently seeking therapy related to depression and anxiety around self-exploration related to his bi-sexual sexual identity and has recent come out, which is something he is still navigating. Pt grew up in a very conservative Rastafari home, where same-sex relations were not supported, which has caused him to have an internal struggle with being his authentic self. Since last session, pt reported that things haven't been very different and yet he has been having a lot more anxiety. Pt identified that he has been having a lot of social anxiety, which he thinks is being driven by not liking how he is looking right now, which makes him feel self-conscious. Pt processed through how he doesn't like his current weight nor his current haircut. After discussing it at length pt reflected that he doesn't like looking how others want him to vs how he would like to look. Pt acknowledged that he does deal with misplaced guilt and a lot of his unhelpful decisions/patterns are driven by it, including his haircut. Pt identified that much of his misplaced guilt comes from feeling that if he prioritizes what he wants it feels selfish and that makes him a \"bad\" person. Pt verbalized understanding that this may not be fair, and yet he currently would rather be unfair to himself then others. Pt expressed after reflecting on this, that he has come to find that a lot of his negative thought processes are driven by emotions and he desire to avoid thinking about unpleasant truths he isn't ready to fully admit. Furthermore, pt isn't sure he is ready to make the changes that he is coming to realize he needs to make, including not placing unrealistic expectations on himself and improving his ability to set boundaries, as he worries this may cause his family to abandon/leave him. Session took longer then anticipated due to presenting concerns needing to be addressed. "       Current coping skills-  Music  Breathing technique  D8 pen (vape) - unhelpful  Eating - unhelpful  Socializing  Reading to escape (games books dnd) - unhelpful in excess   Exercising  Singing (car singing)  Staying busy at work a lot with low-priority or off tasks - unhelpful    Look up LGBT people who are surprising to pt, and bring 3 examples - openly singh [employer] member, music artists, [employer] channel: middle ground   Make list of 5 potential jobs or job fields you may want to go into - doesn't feel he has the confidence to apply for some jobs due to internalizing some feelings of past failure.      Treatment Objective(s) Addressed in This Session:   identify current thoughts, triggers, and stressors which contribute to feelings of depression  identify unhelpful or unrealistic fears / thoughts that contribute to feeling anxious  Increase interest, engagement, and pleasure in doing things  Decrease frequency and intensity of feeling down, depressed, hopeless  Feel less tired and more energy during the day   Identify negative self-talk and behaviors: challenge core beliefs, myths, and actions  identify coping strategies for improving mood  identify two areas of life that you would like to have improved functioning     Intervention:   CBT: Discussed how he frames interactions and if that is helpful vs unhelpful   Psychodynamic: Processed internal-experiences related to feelings of inherent conflict/competiton in social interactions and at work   Solution Focused: Identified areas in which pt is self-imposing barriers and possible strategies to overcome them.    Motivational Interviewing    MI Intervention: Expressed Empathy/Understanding, Supported Autonomy, Collaboration, Evocation, Open-ended questions, and Reflections: simple and complex     Change Talk Expressed by the Patient: Desire to change Reasons to change Committment to change    Provider Response to Change Talk: E - Evoked more info from patient  about behavior change, A - Affirmed patient's thoughts, decisions, or attempts at behavior change, R - Reflected patient's change talk, and S - Summarized patient's change talk statements        Assessments completed prior to visit:  PHQ2:       7/18/2023    12:57 PM 3/30/2023     7:11 AM 3/30/2023     7:10 AM 9/2/2022    10:43 AM 9/2/2022    10:37 AM 7/22/2022     1:00 PM 7/15/2022     1:03 PM   PHQ-2 ( 1999 [employer])   Q1: Little interest or pleasure in doing things 1 1 1 0 0 1 1   Q2: Feeling down, depressed or hopeless 1 1 1 0 0 1 1   PHQ-2 Score 2 2 2 0 0 2 2   Q1: Little interest or pleasure in doing things Several days Several days    Several days   Not at all Several days Several days   Q2: Feeling down, depressed or hopeless Several days Several days    Several days   Not at all Several days Several days   PHQ-2 Score 2 2    2   0 2 2     PHQ9:       7/13/2015    11:20 AM 12/3/2021     7:36 AM 1/24/2022    12:06 PM 2/21/2022    12:42 PM 9/2/2022    10:43 AM   PHQ-9 SCORE   PHQ-9 Total Score 17       PHQ-9 Total Score MyChart  6 (Mild depression)      PHQ-9 Total Score  6 8 6 1     GAD2:       12/14/2021     7:43 AM 3/18/2022    11:00 AM 6/17/2022     1:02 PM 7/1/2022    12:58 PM 7/15/2022     1:04 PM 5/31/2023    12:23 PM   MILAD-2   Feeling nervous, anxious, or on edge 1 1 1    1 1 1    1 2   Not being able to stop or control worrying 1 1 1    1 1 1    1 1   MILAD-2 Total Score 2 2 2    2 2 2    2 3     GAD7:       12/3/2021     7:37 AM 1/24/2022    12:06 PM 2/21/2022    12:42 PM 9/2/2022    10:43 AM 5/31/2023    12:23 PM   MILAD-7 SCORE   Total Score 5 (mild anxiety)    9 (mild anxiety)   Total Score 5 7 8 1 9     PROMIS 10-Global Health (only subscores and total score):       12/14/2021     7:44 AM 2/21/2022    12:42 PM 3/18/2022    11:01 AM 6/17/2022     1:02 PM 7/1/2022    12:59 PM 7/15/2022     1:05 PM 5/31/2023    12:25 PM   PROMIS-10 Scores Only   Global Mental Health Score 10 14 11 11    11 11 11    11 8    Global Physical Health Score 14 17 16 14    14 17 17    17 15   PROMIS TOTAL - SUBSCORES 24 31 27 25    25 28 28    28 23         ASSESSMENT: Current Emotional / Mental Status (status of significant symptoms):   Risk status (Self / Other harm or suicidal ideation)   Patient denies current fears or concerns for personal safety.   Patient denies current or recent suicidal ideation or behaviors.   Patient denies current or recent homicidal ideation or behaviors.   Patient denies current or recent self injurious behavior or ideation.   Patient denies other safety concerns.   Patient reports there has been no change in risk factors since their last session.     Patient reports there has been no change in protective factors since their last session.     Recommended that patient call 911 or go to the local ED should there be a change in any of these risk factors.     Appearance:   Appropriate    Eye Contact:   Good    Psychomotor Behavior: Normal    Attitude:   Cooperative  Interested Friendly Pleasant   Orientation:   All   Speech    Rate / Production: Normal/ Responsive Talkative    Volume:  Normal    Mood:    Depressed  Normal   Affect:    Appropriate  Worrisome    Thought Content:  Clear    Thought Form:  Coherent  Logical    Insight:    Good  and Intellectual Insight     Medication Review:   No changes to current psychiatric medication(s)     Medication Compliance:   Yes     Changes in Health Issues:   None reported     Chemical Use Review:   Substance Use: Chemical use reviewed, no active concerns identified      Tobacco Use: No current tobacco use.      Diagnosis:  1. Adjustment disorder with mixed anxiety and depressed mood        Collateral Reports Completed:   Not Applicable    PLAN: (Patient Tasks / Therapist Tasks / Other)  Continue to check-in with self about helpful vs unhelpful behavior  Evaluate if you are being emotion driven and what those emotions may be telling you  Work on identifying misplaced  guilt  Consider changes you might be willing to work towards    Keaton Stein, [facility]                                                       ______________________________________________________________________    Individual Treatment Plan    Patient's Name: Jamari Cota  YOB: 2000    Date of Creation: 3/4/22  Date Treatment Plan Last Reviewed/Revised: 6/6/23    DSM5 Diagnoses: Adjustment Disorders  309.28 (F43.23) With mixed anxiety and depressed mood  Psychosocial / Contextual Factors: concerns about acceptance by family, pt has started dating, financial stressors  PROMIS (reviewed every 90 days):   PROMIS 10-Global Health (only subscores and total score):       12/14/2021     7:44 AM 2/21/2022    12:42 PM 3/18/2022    11:01 AM 6/17/2022     1:02 PM 7/1/2022    12:59 PM 7/15/2022     1:05 PM 5/31/2023    12:25 PM   PROMIS-10 Scores Only   Global Mental Health Score 10 14 11 11    11 11 11    11 8   Global Physical Health Score 14 17 16 14    14 17 17    17 15   PROMIS TOTAL - SUBSCORES 24 31 27 25    25 28 28    28 23       Referral / Collaboration:  Referral to another professional/service is not indicated at this time..    Anticipated number of session for this episode of care: 8-13  Anticipation frequency of session: Biweekly  Anticipated Duration of each session: 38-52 minutes  Treatment plan will be reviewed in 90 days or when goals have been changed.       MeasurableTreatment Goal(s) related to diagnosis / functional impairment(s)  Goal 1: Patient will develop helpful coping skills, while learning to recognize unhelpful ones, and build up his inner strength.     I will know I've met my goal when I am more able to feel more confident in myself and decisions/presetation.     Objective #A (Patient Action)    Patient will identify helpful vs unhelpful coping strategies to more effectively address stressors  exercise for 30 minutes 3 times per week for 30 minutes  Decrease frequency and intensity  of feeling down, depressed, hopeless  Improve diet, appetite, mindful eating, and / or meal planning  Identify negative self-talk and behaviors: challenge core beliefs, myths, and actions  identify 5 coping strategies for improving mood  practice one mindfulness skill each day for 5-10 minutes.  Status: Continued - Date(s):  6/6/23    Intervention(s)  Therapist will assign homework related to target objective with the intent to promote pt autonomy and better manage MH.  Facilitate increase in self-awareness  Provide psycho-education on mindfulness and helpful vs. unhelpful coping identification  Teach/promote utilization of coping skill development/exploration and mindfulness techniques    Objective #B Pt will learn how to develop and identify a helpful support system.  Patient will participate in social or interpersonal activities to improve mood  attend and participate in social or recreational activities to expand social network/support  Increase interest, engagement, and pleasure in doing things  Identify negative self-talk and behaviors: challenge core beliefs, myths, and actions  track and record at least 3 pleasant exchanges with friends and new people .  Status: Deferred - Date: 6/6/23      Intervention(s)  Therapist will assign homework related to target objective with the intent to promote pt autonomy and better manage MH.  Facilitate increase in self-awareness  Provide psycho-education on social distortions/fallacies and boundaries  Teach/promote utilization of social engagement skills and problem solving    Goal 2: Patient will be more able to engage socially and feel less anxious when interacting with others more consistently.     I will know I've met my goal when I am able to be less stressed/anxious in social interactions.      Objective #A (Patient Action)    Status: Continued - Date(s): 6/6/23    Patient will identify and compliment positives at least 3 times daily to support positive  "self-image  identify unhelpful or unrealistic thoughts/concerns and stressors which contribute to feelings of anxiety  learn and demonstrate pro-social and communication assertiveness skill(s)  initiate 3 social contact(s) per day for increasing lengths of time  practice setting boundaries 3 times in the next several weeks  accept compliment with a \"thank you\" and no self deprecating comments.    Intervention(s)  Therapist will assign homework related to target objective with the intent to promote pt autonomy and better manage MH.  Facilitate increase in self-awareness  Provide psycho-education on social anxiety thinking and communication styles  Teach/promote utilization of thought challenging and self-confidence building    Goal 3: Patient will work on processing through self-identity and how to express his authentic or genuine self while navigating relationships, especially pre-existing ones.     I will know I've met my goal when I feel more comfortable being my authentic or genuine self with people I know.      Objective #A (Patient Action)  Status: Continued - Date(s): 6/6/23    Patient will identify communication strategies to more effectively address stressors  use thought-stopping strategy daily to reduce intrusive thoughts  Identify negative self-talk and behaviors: challenge core beliefs, myths, and actions  identify how the use of substances contributes to the avoidance of feeling associated with the loss  identify the time in your life when you began to feel poorly about themselves.  Use positive self-affirmation daily to promote self-acceptance.    Intervention(s)  Therapist will assign homework related to target objective with the intent to promote pt autonomy and better manage MH.  Facilitate increase in self-awareness  Provide psycho-education on self-compassion and anticipatory grief and avoidance  Teach/promote utilization of self-care/acceptance and processing through " internal-experiences    Patient has reviewed and agreed to the above plan.      Keaton Stein, [facility]  March 4, 2022

## 2023-09-12 ENCOUNTER — VIRTUAL VISIT (OUTPATIENT)
Dept: PSYCHOLOGY | Facility: CLINIC | Age: 23
End: 2023-09-12
Payer: COMMERCIAL

## 2023-09-12 ENCOUNTER — OFFICE VISIT (OUTPATIENT)
Dept: FAMILY MEDICINE | Facility: CLINIC | Age: 23
End: 2023-09-12
Payer: COMMERCIAL

## 2023-09-12 VITALS
OXYGEN SATURATION: 99 % | RESPIRATION RATE: 16 BRPM | BODY MASS INDEX: 29.53 KG/M2 | WEIGHT: 218 LBS | SYSTOLIC BLOOD PRESSURE: 121 MMHG | TEMPERATURE: 98 F | HEIGHT: 72 IN | DIASTOLIC BLOOD PRESSURE: 79 MMHG | HEART RATE: 71 BPM

## 2023-09-12 DIAGNOSIS — Z87.39 HISTORY OF GOUT: ICD-10-CM

## 2023-09-12 DIAGNOSIS — L91.8 SKIN TAG: ICD-10-CM

## 2023-09-12 DIAGNOSIS — L98.9 SKIN LESION: ICD-10-CM

## 2023-09-12 DIAGNOSIS — F43.23 ADJUSTMENT DISORDER WITH MIXED ANXIETY AND DEPRESSED MOOD: Primary | ICD-10-CM

## 2023-09-12 DIAGNOSIS — H00.012 HORDEOLUM EXTERNUM OF RIGHT LOWER EYELID: Primary | ICD-10-CM

## 2023-09-12 PROBLEM — J30.1 SEASONAL ALLERGIC RHINITIS DUE TO POLLEN: Status: ACTIVE | Noted: 2019-08-09

## 2023-09-12 PROBLEM — J30.89 ALLERGIC RHINITIS DUE TO DUST MITE: Status: ACTIVE | Noted: 2019-08-09

## 2023-09-12 PROCEDURE — 90837 PSYTX W PT 60 MINUTES: CPT | Mod: VID | Performed by: COUNSELOR

## 2023-09-12 PROCEDURE — 99213 OFFICE O/P EST LOW 20 MIN: CPT | Mod: 25 | Performed by: PHYSICIAN ASSISTANT

## 2023-09-12 PROCEDURE — 11200 RMVL SKIN TAGS UP TO&INC 15: CPT | Performed by: PHYSICIAN ASSISTANT

## 2023-09-12 ASSESSMENT — ENCOUNTER SYMPTOMS: EYE PAIN: 0

## 2023-09-12 NOTE — PROGRESS NOTES
Assessment & Plan     Hordeolum externum of right lower eyelid  Follow up with the eye doctor for treatment of the on going stye.  - Adult Eye  Referral; Future    Skin tag  Removed two today  Areas of the left neck cleaned with alcohol wipe and using dermablade two skin tags were removed. Hemostasis was difficult but eventually achieved with Drysol and pressure.  Smallest skin tag along the left neck not removed as it was very small. Offered liquid nitrogen treatment but he declined.    Skin lesion  Has skin lesion on the left mercy-nasal area. Would like to consider removal as it gets clipped and bleeds when he shaves.  - Adult Dermatology Referral; Future    History of gout  [place] levels.    22 minutes spent by me on the date of the encounter doing chart review, history and exam, documentation and further activities per the note       Patient Instructions   Bacitracin topically (can use Vaseline as well)    Nelly Luna PA-C  [facility]    Glenna Kauffman is a 23 year old, presenting for the following health issues:  Eye Problem (Stye - right eye) and Referral (Dermatologist)        9/12/2023    10:48 AM   Additional Questions   Roomed by Lory MORAES CMA       History of Present Illness       Reason for visit:  Stye, derma refferral ask, annual check  Symptom onset:  More than a month  Symptoms include:  Stye  Symptom intensity:  Mild  Symptom progression:  Improving  Had these symptoms before:  No    He eats 2-3 servings of fruits and vegetables daily.He consumes 0 sweetened beverage(s) daily.He exercises with enough effort to increase his heart rate 20 to 29 minutes per day.  He exercises with enough effort to increase his heart rate 4 days per week.   He is taking medications regularly.       He has several skin tags he would like removed and a skin lesion on the left side of the face that he would like removed.    Review of Systems    Eyes:  Negative for pain.          Objective    /79 (BP Location: Right arm, Patient Position: Sitting, Cuff Size: Adult Large)   Pulse 71   Temp 98  F (36.7  C) (Oral)   Resp 16   Ht 1.829 m (6')   Wt 98.9 kg (218 lb)   SpO2 99%   BMI 29.57 kg/m    Body mass index is 29.57 kg/m .  Physical Exam   GENERAL: No acute distress  HEENT: Normocephalic, small external hordeolum along the inner canthus of the right eye.  SKIN: Three skin tags on the neck. One of the anterior right neck, one along the posterior right neck and the other along the lateral left neck.  NEURO: Alert and non-focal

## 2023-09-12 NOTE — PROGRESS NOTES
[facility]                                      Progress Note    Patient Name: Jamari Cota  Date: 9/12/23         Service Type: Individual      Session Start Time: 1:00 pm  Session End Time: 1:59 pm     Session Length: 59 minutes    Session #: 25    Attendees: Client attended alone    Service Modality:  Video Visit:      Provider verified identity through the following two step process.  Patient provided:  Patient is known previously to provider    Telemedicine Visit: The patient's condition can be safely assessed and treated via synchronous audio and visual telemedicine encounter.      Reason for Telemedicine Visit: Services only offered telehealth    Originating Site (Patient Location): Patient's home     Distant Site (Provider Location): [facility] Outpatient Setting: [place]    Consent:  The patient/guardian has verbally consented to: the potential risks and benefits of telemedicine (video visit) versus in person care; bill my insurance or make self-payment for services provided; and responsibility for payment of non-covered services.     Patient would like the video invitation sent by:  My Chart    Mode of Communication:  Video Conference via Amwell    As the provider I attest to compliance with applicable laws and regulations related to telemedicine.    Distant Location (provider location):  On-site    DATA  Interactive Complexity: No  Crisis: No   Extended Session (53+ minutes): PROLONGED SERVICE IN THE OUTPATIENT SETTING REQUIRING DIRECT (FACE-TO-FACE) PATIENT CONTACT BEYOND THE USUAL SERVICE:    - Treatment protocol required additional time to complete, due to the nature of diagnosis being treated.  See Interventions section for details      Progress Since Last Session (Related to Symptoms / Goals / Homework):   Symptoms: Improving symptoms overall, better anxiety management      Homework: Achieved / completed to satisfaction      Episode of Care Goals: Satisfactory  "progress - ACTION (Actively working towards change); Intervened by reinforcing change plan / affirming steps taken     Current / Ongoing Stressors and Concerns:  Pt is currently seeking therapy related to depression and anxiety around self-exploration related to his bi-sexual sexual identity and has recent come out, which is something he is still navigating. Pt grew up in a very conservative Protestant home, where same-sex relations were not supported, which has caused him to have an internal struggle with being his authentic self. Since last session, pt reported that things for the most part have been going well and yet there were some instances that caused his anxiety to spike. Pt found that he was better able to deal with his anxiety spikes, which has been encouraging for him. Pt recounted how he was able to engage in a 20 minute conversation with a random co-worker about his sweater, which is  normally something he would avoid. Pt did express feeling social drained after the conversation, much like if he left the convo early, and yet he didn't have the self-directed anger he would have if he had avoided or \"escaped\" the situation. Pt found this to be helpful experience to reinforce to him that he can handle conversations better and he just needs to better learn his social energy thresholds. Pt reflected that he tends to find interactions with strangers or random people to be less anxiety producing then with people who knows. Pt acknowledge this is probably related to the random people being a lower social risk compared to people he knows. Pt also has felt more able to be his genuine self with new people he meets and at this point finds himself acting like his old self (ie masking) around people he knew from his past. Pt has been framing this more as a tool and less then as a default like he used to, which he thinks is good progress. Pt reflected that he misses Rastafari and the community aspect that he experienced " previously with that being something he would like to reconnect with. Pt discussed potential ways he can start to approach this and where he is in his own spiritual journey when it comes to actually trying to go to a place of Moravian again. Therapist (writer), and pt reviewed and updated his trx plan which caused session to go longer then intended.      Current coping skills-  Music  Breathing technique  D8 pen (vape) - unhelpful  Eating - unhelpful  Socializing  Reading to escape (games books dnd) - unhelpful in excess   Exercising  Singing (car singing)  Staying busy at work a lot with low-priority or off tasks - unhelpful    Look up LGBT people who are surprising to pt, and bring 3 examples - openly singh [employer] member, music artists, [employer] youtube channel: middle ground   Make list of 5 potential jobs or job fields you may want to go into - doesn't feel he has the confidence to apply for some jobs due to internalizing some feelings of past failure.      Treatment Objective(s) Addressed in This Session:   identify current thoughts, triggers, and stressors which contribute to feelings of depression  identify unhelpful or unrealistic fears / thoughts that contribute to feeling anxious  Increase interest, engagement, and pleasure in doing things  Decrease frequency and intensity of feeling down, depressed, hopeless  Feel less tired and more energy during the day   Identify negative self-talk and behaviors: challenge core beliefs, myths, and actions  identify coping strategies for improving mood  identify two areas of life that you would like to have improved functioning     Intervention:   CBT: Discussed how he frames interactions and if that is helpful vs unhelpful   Psychodynamic: Processed internal-experiences related to feelings of inherent conflict/competiton in social interactions and at work   Solution Focused: Identified areas in which pt is self-imposing barriers and possible strategies to overcome  them.    Motivational Interviewing    MI Intervention: Expressed Empathy/Understanding, Supported Autonomy, Collaboration, Evocation, Open-ended questions, and Reflections: simple and complex     Change Talk Expressed by the Patient: Desire to change Ability to change Reasons to change Committment to change Activation    Provider Response to Change Talk: E - Evoked more info from patient about behavior change, A - Affirmed patient's thoughts, decisions, or attempts at behavior change, R - Reflected patient's change talk, and S - Summarized patient's change talk statements        Assessments completed prior to visit:  PHQ2:       7/18/2023    12:57 PM 3/30/2023     7:11 AM 3/30/2023     7:10 AM 9/2/2022    10:43 AM 9/2/2022    10:37 AM 7/22/2022     1:00 PM 7/15/2022     1:03 PM   PHQ-2 ( 1999 [employer])   Q1: Little interest or pleasure in doing things 1 1 1 0 0 1 1   Q2: Feeling down, depressed or hopeless 1 1 1 0 0 1 1   PHQ-2 Score 2 2 2 0 0 2 2   Q1: Little interest or pleasure in doing things Several days Several days    Several days   Not at all Several days Several days   Q2: Feeling down, depressed or hopeless Several days Several days    Several days   Not at all Several days Several days   PHQ-2 Score 2 2    2   0 2 2     PHQ9:       7/13/2015    11:20 AM 12/3/2021     7:36 AM 1/24/2022    12:06 PM 2/21/2022    12:42 PM 9/2/2022    10:43 AM   PHQ-9 SCORE   PHQ-9 Total Score 17       PHQ-9 Total Score MyChart  6 (Mild depression)      PHQ-9 Total Score  6 8 6 1     GAD2:       12/14/2021     7:43 AM 3/18/2022    11:00 AM 6/17/2022     1:02 PM 7/1/2022    12:58 PM 7/15/2022     1:04 PM 5/31/2023    12:23 PM   MILAD-2   Feeling nervous, anxious, or on edge 1 1 1    1 1 1    1 2   Not being able to stop or control worrying 1 1 1    1 1 1    1 1   MILAD-2 Total Score 2 2 2    2 2 2    2 3     GAD7:       12/3/2021     7:37 AM 1/24/2022    12:06 PM 2/21/2022    12:42 PM 9/2/2022    10:43 AM 5/31/2023    12:23 PM    MILAD-7 SCORE   Total Score 5 (mild anxiety)    9 (mild anxiety)   Total Score 5 7 8 1 9     PROMIS 10-Global Health (only subscores and total score):       12/14/2021     7:44 AM 2/21/2022    12:42 PM 3/18/2022    11:01 AM 6/17/2022     1:02 PM 7/1/2022    12:59 PM 7/15/2022     1:05 PM 5/31/2023    12:25 PM   PROMIS-10 Scores Only   Global Mental Health Score 10 14 11 11    11 11 11    11 8   Global Physical Health Score 14 17 16 14    14 17 17    17 15   PROMIS TOTAL - SUBSCORES 24 31 27 25    25 28 28    28 23         ASSESSMENT: Current Emotional / Mental Status (status of significant symptoms):   Risk status (Self / Other harm or suicidal ideation)   Patient denies current fears or concerns for personal safety.   Patient denies current or recent suicidal ideation or behaviors.   Patient denies current or recent homicidal ideation or behaviors.   Patient denies current or recent self injurious behavior or ideation.   Patient denies other safety concerns.   Patient reports there has been no change in risk factors since their last session.     Patient reports there has been no change in protective factors since their last session.     Recommended that patient call 911 or go to the local ED should there be a change in any of these risk factors.     Appearance:   Appropriate    Eye Contact:   Good    Psychomotor Behavior: Normal    Attitude:   Cooperative  Interested Friendly Pleasant   Orientation:   All   Speech    Rate / Production: Normal/ Responsive Talkative    Volume:  Normal    Mood:    Depressed  Normal   Affect:    Appropriate    Thought Content:  Clear    Thought Form:  Coherent  Logical    Insight:    Good  and Intellectual Insight     Medication Review:   No changes to current psychiatric medication(s)     Medication Compliance:   Yes     Changes in Health Issues:   None reported     Chemical Use Review:   Substance Use: Chemical use reviewed, no active concerns identified      Tobacco Use: No current  tobacco use.      Diagnosis:  1. Adjustment disorder with mixed anxiety and depressed mood        Collateral Reports Completed:   Not Applicable    PLAN: (Patient Tasks / Therapist Tasks / Other)  Be more mindful of reaction to emotions  Looking into religions  Being honest with self in check-ins  Consider changes you might be willing to work towards (extra credit)    Keaton Stein, [facility]                                                       ______________________________________________________________________    Individual Treatment Plan    Patient's Name: Jamari Cota  YOB: 2000    Date of Creation: 3/4/22  Date Treatment Plan Last Reviewed/Revised: 9/12/23    DSM5 Diagnoses: Adjustment Disorders  309.28 (F43.23) With mixed anxiety and depressed mood  Psychosocial / Contextual Factors: concerns about acceptance by family, pt has started dating, financial stressors  PROMIS (reviewed every 90 days):   PROMIS 10-Global Health (only subscores and total score):       12/14/2021     7:44 AM 2/21/2022    12:42 PM 3/18/2022    11:01 AM 6/17/2022     1:02 PM 7/1/2022    12:59 PM 7/15/2022     1:05 PM 5/31/2023    12:25 PM   PROMIS-10 Scores Only   Global Mental Health Score 10 14 11 11    11 11 11    11 8   Global Physical Health Score 14 17 16 14    14 17 17    17 15   PROMIS TOTAL - SUBSCORES 24 31 27 25    25 28 28    28 23       Referral / Collaboration:  Referral to another professional/service is not indicated at this time..    Anticipated number of session for this episode of care: 8-13  Anticipation frequency of session: Biweekly  Anticipated Duration of each session: 38-52 minutes  Treatment plan will be reviewed in 90 days or when goals have been changed.       MeasurableTreatment Goal(s) related to diagnosis / functional impairment(s)  Goal 1: Patient will develop helpful coping skills, while learning to recognize unhelpful ones, and build up his inner strength.     I will know I've met my  goal when I am more able to feel more confident in myself and decisions/presetation.     Objective #A (Patient Action)    Patient will identify helpful vs unhelpful coping strategies to more effectively address stressors  exercise for 30 minutes 3 times per week for 30 minutes  Decrease frequency and intensity of feeling down, depressed, hopeless  Improve diet, appetite, mindful eating, and / or meal planning  Identify negative self-talk and behaviors: challenge core beliefs, myths, and actions  identify 5 coping strategies for improving mood  practice one mindfulness skill each day for 5-10 minutes.  Status: Continued - Date(s):  9/12/23    Intervention(s)  Therapist will assign homework related to target objective with the intent to promote pt autonomy and better manage MH.  Facilitate increase in self-awareness  Provide psycho-education on mindfulness and helpful vs. unhelpful coping identification  Teach/promote utilization of coping skill development/exploration and mindfulness techniques    Objective #B Pt will learn how to develop and identify a helpful support system.  Patient will participate in social or interpersonal activities to improve mood  attend and participate in social or recreational activities to expand social network/support  Increase interest, engagement, and pleasure in doing things  Identify negative self-talk and behaviors: challenge core beliefs, myths, and actions  track and record at least 3 pleasant exchanges with friends and new people .  Status: Restarted - Date: 9/12/23      Intervention(s)  Therapist will assign homework related to target objective with the intent to promote pt autonomy and better manage MH.  Facilitate increase in self-awareness  Provide psycho-education on social distortions/fallacies and boundaries  Teach/promote utilization of social engagement skills and problem solving    Goal 2: Patient will be more able to engage socially and feel less anxious when  "interacting with others more consistently and not feel like he needs to leave.     I will know I've met my goal when I am able to be less stressed/anxious in social interactions.      Objective #A (Patient Action)    Status: Continued - Date(s): 9/12/23    Patient will identify and compliment positives at least 3 times daily to support positive self-image  identify unhelpful or unrealistic thoughts/concerns and stressors which contribute to feelings of anxiety  learn and demonstrate pro-social and communication assertiveness skill(s)  initiate 3 social contact(s) per day for increasing lengths of time  practice setting boundaries 3 times in the next several weeks  accept compliment with a \"thank you\" and no self deprecating comments.    Intervention(s)  Therapist will assign homework related to target objective with the intent to promote pt autonomy and better manage MH.  Facilitate increase in self-awareness  Provide psycho-education on social anxiety thinking and communication styles  Teach/promote utilization of thought challenging and self-confidence building    Goal 3: Patient will work on processing through self-identity and how to express his authentic or genuine self while navigating relationships, especially pre-existing ones.     I will know I've met my goal when I feel more comfortable being my authentic or genuine self with people I know.      Objective #A (Patient Action)  Status: Continued - Date(s): 9/12/23    Patient will identify communication strategies to more effectively address stressors  use thought-stopping strategy daily to reduce intrusive thoughts  Identify negative self-talk and behaviors: challenge core beliefs, myths, and actions  identify how the use of substances contributes to the avoidance of feeling associated with the loss  identify the time in your life when you began to feel poorly about themselves.  Use positive self-affirmation daily to promote " self-acceptance.    Intervention(s)  Therapist will assign homework related to target objective with the intent to promote pt autonomy and better manage MH.  Facilitate increase in self-awareness  Provide psycho-education on self-compassion and anticipatory grief and avoidance  Teach/promote utilization of self-care/acceptance and processing through internal-experiences    Patient has reviewed and agreed to the above plan.      Keaton Stein, [facility]  [facility]  March 4, 2022

## 2023-10-05 DIAGNOSIS — K75.81 NASH (NONALCOHOLIC STEATOHEPATITIS): Primary | ICD-10-CM

## 2023-10-10 ENCOUNTER — VIRTUAL VISIT (OUTPATIENT)
Dept: PSYCHOLOGY | Facility: CLINIC | Age: 23
End: 2023-10-10
Payer: COMMERCIAL

## 2023-10-10 DIAGNOSIS — F43.23 ADJUSTMENT DISORDER WITH MIXED ANXIETY AND DEPRESSED MOOD: Primary | ICD-10-CM

## 2023-10-10 PROCEDURE — 90837 PSYTX W PT 60 MINUTES: CPT | Mod: VID | Performed by: COUNSELOR

## 2023-10-10 ASSESSMENT — ANXIETY QUESTIONNAIRES
6. BECOMING EASILY ANNOYED OR IRRITABLE: SEVERAL DAYS
3. WORRYING TOO MUCH ABOUT DIFFERENT THINGS: SEVERAL DAYS
5. BEING SO RESTLESS THAT IT IS HARD TO SIT STILL: SEVERAL DAYS
IF YOU CHECKED OFF ANY PROBLEMS ON THIS QUESTIONNAIRE, HOW DIFFICULT HAVE THESE PROBLEMS MADE IT FOR YOU TO DO YOUR WORK, TAKE CARE OF THINGS AT HOME, OR GET ALONG WITH OTHER PEOPLE: SOMEWHAT DIFFICULT
7. FEELING AFRAID AS IF SOMETHING AWFUL MIGHT HAPPEN: SEVERAL DAYS
GAD7 TOTAL SCORE: 8
GAD7 TOTAL SCORE: 8
4. TROUBLE RELAXING: SEVERAL DAYS
1. FEELING NERVOUS, ANXIOUS, OR ON EDGE: MORE THAN HALF THE DAYS
2. NOT BEING ABLE TO STOP OR CONTROL WORRYING: SEVERAL DAYS

## 2023-10-10 NOTE — PROGRESS NOTES
[facility]                                      Progress Note    Patient Name: Jamari Cota  Date: 10/10/23         Service Type: Individual      Session Start Time: 1:00 pm  Session End Time: 1:59 pm     Session Length: 59 minutes    Session #: 25    Attendees: Client attended alone    Service Modality:  Video Visit:      Provider verified identity through the following two step process.  Patient provided:  Patient is known previously to provider    Telemedicine Visit: The patient's condition can be safely assessed and treated via synchronous audio and visual telemedicine encounter.      Reason for Telemedicine Visit: Services only offered telehealth    Originating Site (Patient Location): Patient's home     Distant Site (Provider Location): [facility] Outpatient Setting: [place]    Consent:  The patient/guardian has verbally consented to: the potential risks and benefits of telemedicine (video visit) versus in person care; bill my insurance or make self-payment for services provided; and responsibility for payment of non-covered services.     Patient would like the video invitation sent by:  My Chart    Mode of Communication:  Video Conference via Amwell    As the provider I attest to compliance with applicable laws and regulations related to telemedicine.    Distant Location (provider location):  On-site    DATA  Interactive Complexity: No  Crisis: No   Extended Session (53+ minutes): PROLONGED SERVICE IN THE OUTPATIENT SETTING REQUIRING DIRECT (FACE-TO-FACE) PATIENT CONTACT BEYOND THE USUAL SERVICE:    - Patient's presenting concerns require more intensive intervention than could be completed within the usual service      Progress Since Last Session (Related to Symptoms / Goals / Homework):   Symptoms: No change in symptoms, pt has mostly felt the same      Homework: Partially completed      Episode of Care Goals: Satisfactory progress - PREPARATION (Decided to change -  considering how); Intervened by negotiating a change plan and determining options / strategies for behavior change, identifying triggers, exploring social supports, and working towards setting a date to begin behavior change     Current / Ongoing Stressors and Concerns:  Pt is currently seeking therapy related to depression and anxiety around self-exploration related to his bi-sexual or singh sexual identity and has recently come out, which is something he is still navigating. Pt grew up in a very conservative Yazidism home, where same-sex relations were not supported, which has caused him to have an internal struggle with being his authentic self. Since last session, pt reported that he has been feeling more anxiety recently with some changes that he has been dealing with. Pt went camping with some new people, which went well and he was glad he did it. Two of pt's room-mates moved out in the past couple weeks, with one week notice, and they have only been in their current lease for 4 months. Pt is feeling some financial stress due to his room-mates leaving early from the lease and they are trying to find a new room-mate to replace them. Pt also mentioned that when his room-mates moved out they took somethings that weren't theirs, which he minimized what he felt about that. Pt worked on expressing his feelings in session today and talking about how/why he was feeling the way he was. Pt identified feeling, anxious about meeting in an hour, running dnd tonight and not being prepared, and needing to plan for upcoming trips including going to see his dad in a month. Pt was feeling sad and embarrassed because he felt like he was failing a social interaction (ie. Therapy). Pt has recently been feeling tired, little run down, and feeling frustrated because he thinks he has been trying very hard to do better and not feeling/seeing progress. Pt has been dealing with famiy drama related to his sister acting rebellious which he  is worried his mom might kick her out, and his older family members aren't doing great health wise. Pt has reached out to his friend kade, though she hasn't responded recently so he is waiting in anticipation for that. Pt finally mentioned that he accidentally came out at work to a couple people, who while trying to be supportive are not being supportive in a way he finds helpful. Pt clarified that one is singh themselves, but doesn't seem to understand/relate to pt's experience of being singh (this co-worker is very open about being singh at work already) and the other identifies as an ally who tries asking him questions about being singh, though pt thinks he doesn't really have qualifies answers to give (which reminds him how out of step he feels at times). Pt reflected on these experiences, acknowledging that these co-workers are not trying to be unhelpful and yet he feels like he is being treated differently then he used to which he doesn't like. Pt agreed that he may need to be pro-active in looking for others who can relate more to his own experiences of coming out, as he feels a disconnect with the people he has tried to engage with so far. Pt briefly discussed the possibility to trying anti-depressants as he is starting to recognize that his depression is very impactful and he can't always manage is on his own. Pt was encouraged to make an appointment with his PCP to discuss meds further and to not frame them as a forever thing. Session went longer then anticipated due to pt's presenting issues.    Current coping skills-  Music  Breathing technique  D8 pen (vape) - unhelpful  Eating - unhelpful  Socializing  Reading to escape (games books dnd) - unhelpful in excess   Exercising  Singing (car singing)  Staying busy at work a lot with low-priority or off tasks - unhelpful    Look up LGBT people who are surprising to pt, and bring 3 examples - openly singh [employer] member, music artists, [employer]: middle  ground   Make list of 5 potential jobs or job fields you may want to go into - doesn't feel he has the confidence to apply for some jobs due to internalizing some feelings of past failure.      Treatment Objective(s) Addressed in This Session:   identify current thoughts, triggers, and stressors which contribute to feelings of depression  identify unhelpful or unrealistic fears / thoughts that contribute to feeling anxious  Increase interest, engagement, and pleasure in doing things  Decrease frequency and intensity of feeling down, depressed, hopeless  Feel less tired and more energy during the day   Identify negative self-talk and behaviors: challenge core beliefs, myths, and actions  identify coping strategies for improving mood  identify two areas of life that you would like to have improved functioning     Intervention:   CBT: Discussed how he frames interactions and if that is helpful vs unhelpful   Psychodynamic: Processed internal-experiences related to feelings of inherent conflict/competiton in social interactions and at work   Solution Focused: Identified areas in which pt is self-imposing barriers and possible strategies to overcome them.    Motivational Interviewing    MI Intervention: Expressed Empathy/Understanding, Supported Autonomy, Collaboration, Evocation, Open-ended questions, Reflections: simple and complex, Change talk (evoked), and Reframe     Change Talk Expressed by the Patient: Ability to change Reasons to change Need to change Committment to change Activation    Provider Response to Change Talk: E - Evoked more info from patient about behavior change, A - Affirmed patient's thoughts, decisions, or attempts at behavior change, R - Reflected patient's change talk, and S - Summarized patient's change talk statements        Assessments completed prior to visit:  PHQ2:       7/18/2023    12:57 PM 3/30/2023     7:11 AM 3/30/2023     7:10 AM 9/2/2022    10:43 AM 9/2/2022    10:37 AM 7/22/2022      1:00 PM 7/15/2022     1:03 PM   PHQ-2 ( 1999 [employer])   Q1: Little interest or pleasure in doing things 1 1 1 0 0 1 1   Q2: Feeling down, depressed or hopeless 1 1 1 0 0 1 1   PHQ-2 Score 2 2 2 0 0 2 2   Q1: Little interest or pleasure in doing things Several days Several days    Several days   Not at all Several days Several days   Q2: Feeling down, depressed or hopeless Several days Several days    Several days   Not at all Several days Several days   PHQ-2 Score 2 2    2   0 2 2     PHQ9:       7/13/2015    11:20 AM 12/3/2021     7:36 AM 1/24/2022    12:06 PM 2/21/2022    12:42 PM 9/2/2022    10:43 AM   PHQ-9 SCORE   PHQ-9 Total Score 17       PHQ-9 Total Score MyChart  6 (Mild depression)      PHQ-9 Total Score  6 8 6 1     GAD2:       12/14/2021     7:43 AM 3/18/2022    11:00 AM 6/17/2022     1:02 PM 7/1/2022    12:58 PM 7/15/2022     1:04 PM 5/31/2023    12:23 PM 10/10/2023    12:54 PM   MILAD-2   Feeling nervous, anxious, or on edge 1 1 1    1 1 1    1 2 2   Not being able to stop or control worrying 1 1 1    1 1 1    1 1 1   MILAD-2 Total Score 2 2 2    2 2 2    2 3 3     GAD7:       12/3/2021     7:37 AM 1/24/2022    12:06 PM 2/21/2022    12:42 PM 9/2/2022    10:43 AM 5/31/2023    12:23 PM 10/10/2023    12:54 PM   MILAD-7 SCORE   Total Score 5 (mild anxiety)    9 (mild anxiety) 8 (mild anxiety)   Total Score 5 7 8 1 9 8     PROMIS 10-Global Health (only subscores and total score):       2/21/2022    12:42 PM 3/18/2022    11:01 AM 6/17/2022     1:02 PM 7/1/2022    12:59 PM 7/15/2022     1:05 PM 5/31/2023    12:25 PM 9/12/2023    12:58 PM   PROMIS-10 Scores Only   Global Mental Health Score 14 11 11    11 11 11    11 8 12   Global Physical Health Score 17 16 14    14 17 17    17 15 16   PROMIS TOTAL - SUBSCORES 31 27 25    25 28 28    28 23 28         ASSESSMENT: Current Emotional / Mental Status (status of significant symptoms):   Risk status (Self / Other harm or suicidal ideation)   Patient denies current fears  or concerns for personal safety.   Patient denies current or recent suicidal ideation or behaviors.   Patient denies current or recent homicidal ideation or behaviors.   Patient denies current or recent self injurious behavior or ideation.   Patient denies other safety concerns.   Patient reports there has been no change in risk factors since their last session.     Patient reports there has been no change in protective factors since their last session.     Recommended that patient call 911 or go to the local ED should there be a change in any of these risk factors.     Appearance:   Appropriate    Eye Contact:   Good    Psychomotor Behavior: Normal    Attitude:   Cooperative  Interested Friendly Pleasant   Orientation:   All   Speech    Rate / Production: Normal/ Responsive Talkative    Volume:  Normal    Mood:    Depressed  Normal   Affect:    Appropriate  Worrisome    Thought Content:  Clear    Thought Form:  Coherent  Logical    Insight:    Fair  and Intellectual Insight     Medication Review:   No changes to current psychiatric medication(s)     Medication Compliance:   Yes     Changes in Health Issues:   None reported     Chemical Use Review:   Substance Use: Chemical use reviewed, no active concerns identified      Tobacco Use: No current tobacco use.      Diagnosis:  1. Adjustment disorder with mixed anxiety and depressed mood        Collateral Reports Completed:   Not Applicable    PLAN: (Patient Tasks / Therapist Tasks / Other)  Be more mindful of reaction to emotions  Being honest with self in check-ins  Continue to consider changes you might be willing to work towards (extra credit)  Make appointment with PCP to discuss meds  Look into LGBT+ volunteer opportunities     MANISHA Kruger                                                       ______________________________________________________________________    Individual Treatment Plan    Patient's Name: Jamari Bernstein Cipriano  Date Of  Birth: 2000    Date of Creation: 3/4/22  Date Treatment Plan Last Reviewed/Revised: 9/12/23    DSM5 Diagnoses: Adjustment Disorders  309.28 (F43.23) With mixed anxiety and depressed mood  Psychosocial / Contextual Factors: concerns about acceptance by family, pt has started dating, financial stressors  PROMIS (reviewed every 90 days):   PROMIS 10-Global Health (only subscores and total score):       2/21/2022    12:42 PM 3/18/2022    11:01 AM 6/17/2022     1:02 PM 7/1/2022    12:59 PM 7/15/2022     1:05 PM 5/31/2023    12:25 PM 9/12/2023    12:58 PM   PROMIS-10 Scores Only   Global Mental Health Score 14 11 11    11 11 11    11 8 12   Global Physical Health Score 17 16 14    14 17 17    17 15 16   PROMIS TOTAL - SUBSCORES 31 27 25    25 28 28    28 23 28       Referral / Collaboration:  Referral to another professional/service is not indicated at this time..    Anticipated number of session for this episode of care: 8-13  Anticipation frequency of session: Biweekly  Anticipated Duration of each session: 38-52 minutes  Treatment plan will be reviewed in 90 days or when goals have been changed.       MeasurableTreatment Goal(s) related to diagnosis / functional impairment(s)  Goal 1: Patient will develop helpful coping skills, while learning to recognize unhelpful ones, and build up his inner strength.     I will know I've met my goal when I am more able to feel more confident in myself and decisions/presetation.     Objective #A (Patient Action)    Patient will identify helpful vs unhelpful coping strategies to more effectively address stressors  exercise for 30 minutes 3 times per week for 30 minutes  Decrease frequency and intensity of feeling down, depressed, hopeless  Improve diet, appetite, mindful eating, and / or meal planning  Identify negative self-talk and behaviors: challenge core beliefs, myths, and actions  identify 5 coping strategies for improving mood  practice one mindfulness skill each day for  5-10 minutes.  Status: Continued - Date(s):  9/12/23    Intervention(s)  Therapist will assign homework related to target objective with the intent to promote pt autonomy and better manage MH.  Facilitate increase in self-awareness  Provide psycho-education on mindfulness and helpful vs. unhelpful coping identification  Teach/promote utilization of coping skill development/exploration and mindfulness techniques    Objective #B Pt will learn how to develop and identify a helpful support system.  Patient will participate in social or interpersonal activities to improve mood  attend and participate in social or recreational activities to expand social network/support  Increase interest, engagement, and pleasure in doing things  Identify negative self-talk and behaviors: challenge core beliefs, myths, and actions  track and record at least 3 pleasant exchanges with friends and new people .  Status: Restarted - Date: 9/12/23      Intervention(s)  Therapist will assign homework related to target objective with the intent to promote pt autonomy and better manage MH.  Facilitate increase in self-awareness  Provide psycho-education on social distortions/fallacies and boundaries  Teach/promote utilization of social engagement skills and problem solving    Goal 2: Patient will be more able to engage socially and feel less anxious when interacting with others more consistently and not feel like he needs to leave.     I will know I've met my goal when I am able to be less stressed/anxious in social interactions.      Objective #A (Patient Action)    Status: Continued - Date(s): 9/12/23    Patient will identify and compliment positives at least 3 times daily to support positive self-image  identify unhelpful or unrealistic thoughts/concerns and stressors which contribute to feelings of anxiety  learn and demonstrate pro-social and communication assertiveness skill(s)  initiate 3 social contact(s) per day for increasing lengths of  "time  practice setting boundaries 3 times in the next several weeks  accept compliment with a \"thank you\" and no self deprecating comments.    Intervention(s)  Therapist will assign homework related to target objective with the intent to promote pt autonomy and better manage MH.  Facilitate increase in self-awareness  Provide psycho-education on social anxiety thinking and communication styles  Teach/promote utilization of thought challenging and self-confidence building    Goal 3: Patient will work on processing through self-identity and how to express his authentic or genuine self while navigating relationships, especially pre-existing ones.     I will know I've met my goal when I feel more comfortable being my authentic or genuine self with people I know.      Objective #A (Patient Action)  Status: Continued - Date(s): 9/12/23    Patient will identify communication strategies to more effectively address stressors  use thought-stopping strategy daily to reduce intrusive thoughts  Identify negative self-talk and behaviors: challenge core beliefs, myths, and actions  identify how the use of substances contributes to the avoidance of feeling associated with the loss  identify the time in your life when you began to feel poorly about themselves.  Use positive self-affirmation daily to promote self-acceptance.    Intervention(s)  Therapist will assign homework related to target objective with the intent to promote pt autonomy and better manage MH.  Facilitate increase in self-awareness  Provide psycho-education on self-compassion and anticipatory grief and avoidance  Teach/promote utilization of self-care/acceptance and processing through internal-experiences    Patient has reviewed and agreed to the above plan.      Keaton Stein [facility]  March 4, 2022  "

## 2023-10-23 ENCOUNTER — OFFICE VISIT (OUTPATIENT)
Dept: GASTROENTEROLOGY | Facility: CLINIC | Age: 23
End: 2023-10-23
Attending: INTERNAL MEDICINE
Payer: COMMERCIAL

## 2023-10-23 ENCOUNTER — LAB (OUTPATIENT)
Dept: LAB | Facility: CLINIC | Age: 23
End: 2023-10-23
Payer: COMMERCIAL

## 2023-10-23 VITALS
DIASTOLIC BLOOD PRESSURE: 82 MMHG | HEART RATE: 84 BPM | SYSTOLIC BLOOD PRESSURE: 123 MMHG | WEIGHT: 223.1 LBS | TEMPERATURE: 98.2 F | RESPIRATION RATE: 18 BRPM | OXYGEN SATURATION: 98 % | BODY MASS INDEX: 30.22 KG/M2 | HEIGHT: 72 IN

## 2023-10-23 DIAGNOSIS — K76.0 NAFLD (NONALCOHOLIC FATTY LIVER DISEASE): Primary | ICD-10-CM

## 2023-10-23 DIAGNOSIS — K75.81 NASH (NONALCOHOLIC STEATOHEPATITIS): ICD-10-CM

## 2023-10-23 LAB
ALBUMIN SERPL BCG-MCNC: 4.5 G/DL (ref 3.5–5.2)
ALP SERPL-CCNC: 48 U/L (ref 40–129)
ALT SERPL W P-5'-P-CCNC: 14 U/L (ref 0–70)
ANION GAP SERPL CALCULATED.3IONS-SCNC: 8 MMOL/L (ref 7–15)
AST SERPL W P-5'-P-CCNC: 18 U/L (ref 0–45)
BILIRUB DIRECT SERPL-MCNC: 0.27 MG/DL (ref 0–0.3)
BILIRUB SERPL-MCNC: 1.3 MG/DL
BUN SERPL-MCNC: 8 MG/DL (ref 6–20)
CALCIUM SERPL-MCNC: 9.4 MG/DL (ref 8.6–10)
CHLORIDE SERPL-SCNC: 105 MMOL/L (ref 98–107)
CREAT SERPL-MCNC: 0.96 MG/DL (ref 0.67–1.17)
DEPRECATED HCO3 PLAS-SCNC: 28 MMOL/L (ref 22–29)
EGFRCR SERPLBLD CKD-EPI 2021: >90 ML/MIN/1.73M2
ERYTHROCYTE [DISTWIDTH] IN BLOOD BY AUTOMATED COUNT: 12.2 % (ref 10–15)
GLUCOSE SERPL-MCNC: 96 MG/DL (ref 70–99)
HCT VFR BLD AUTO: 46.3 % (ref 40–53)
HGB BLD-MCNC: 15.3 G/DL (ref 13.3–17.7)
INR PPP: 1.14 (ref 0.85–1.15)
MCH RBC QN AUTO: 28 PG (ref 26.5–33)
MCHC RBC AUTO-ENTMCNC: 33 G/DL (ref 31.5–36.5)
MCV RBC AUTO: 85 FL (ref 78–100)
PLATELET # BLD AUTO: 264 10E3/UL (ref 150–450)
POTASSIUM SERPL-SCNC: 4.4 MMOL/L (ref 3.4–5.3)
PROT SERPL-MCNC: 7.2 G/DL (ref 6.4–8.3)
RBC # BLD AUTO: 5.47 10E6/UL (ref 4.4–5.9)
SODIUM SERPL-SCNC: 141 MMOL/L (ref 135–145)
WBC # BLD AUTO: 5.7 10E3/UL (ref 4–11)

## 2023-10-23 PROCEDURE — 85027 COMPLETE CBC AUTOMATED: CPT | Performed by: PATHOLOGY

## 2023-10-23 PROCEDURE — 85610 PROTHROMBIN TIME: CPT | Performed by: PATHOLOGY

## 2023-10-23 PROCEDURE — 36415 COLL VENOUS BLD VENIPUNCTURE: CPT | Performed by: PATHOLOGY

## 2023-10-23 PROCEDURE — 99214 OFFICE O/P EST MOD 30 MIN: CPT | Performed by: INTERNAL MEDICINE

## 2023-10-23 PROCEDURE — 80053 COMPREHEN METABOLIC PANEL: CPT | Performed by: PATHOLOGY

## 2023-10-23 PROCEDURE — 82248 BILIRUBIN DIRECT: CPT | Performed by: PATHOLOGY

## 2023-10-23 PROCEDURE — G0463 HOSPITAL OUTPT CLINIC VISIT: HCPCS | Performed by: INTERNAL MEDICINE

## 2023-10-23 ASSESSMENT — PAIN SCALES - GENERAL: PAINLEVEL: NO PAIN (0)

## 2023-10-23 NOTE — LETTER
10/23/2023         RE: Jamari Cota  [address]  [facility] [address]        Dear Colleague,    Thank you for referring your patient, Jamari Cota, to the [facility] [place]. Please see a copy of my visit note below.    [facility]    Follow-up Visit    Follow-up visit for MASLD    Subjective:  23 year old male    Last visit with Dr. Heard in April 2023.  No new medications, ER visits or hospitalizations since last visit.    Patient is well today.  He denies any symptoms related to liver disease.    Patient denies jaundice, lower extremity edema, abdominal distension, lethargy or confusion.    Patient denies melena, hematemesis or hematochezia.    Patient denies fevers, sweats or chills.      Weight has increased a couple of pounds since last visit.  Appetite is increased due to stress resulting in stress eating.    Patient does not drink alcohol.  He is wondering if he can have a career glass of wine or beer on special occasions.    Patient lives in [place] and works as a .      Medical hx Surgical hx   Past Medical History:   Diagnosis Date     Fatty liver      Otitis media with effusion     chronic     Varicella     about age 3      Past Surgical History:   Procedure Laterality Date     PE TUBES       SURGICAL HISTORY OF -       arm surgery          Medications  Prior to Admission medications    Medication Sig Start Date End Date Taking? Authorizing Provider   hydrOXYzine (ATARAX) 25 MG tablet Take 1 tablet (25 mg) by mouth 3 times daily as needed for itching 6/21/21  Yes Toby Beaulieu MD       Allergies  Allergies   Allergen Reactions     Codeine Palpitations     Tachycardia       Review of systems  A 10-point review of systems was negative    Examination  /82 (BP Location: Right arm, Patient Position: Sitting, Cuff Size: Adult Regular)   Pulse 84   Temp 98.2  F (36.8  C) (Oral)   Resp 18   Ht 1.829 m (6'  "0.01\")   Wt 101.2 kg (223 lb 1.6 oz)   SpO2 98%   BMI 30.25 kg/m    Body mass index is 30.25 kg/m .    Gen- well, NAD, A+Ox3, normal color  CVS- RRR  RS- CTA  Abd- overweight, striae, soft, non-tender, no ascites or organomegaly on palpation or percussion, BS+  Extr- hands normal, no MATTHEW  Skin- no rash or jaundice  Neuro- no asterixis  Psych- normal mood    Laboratory  Lab Results   Component Value Date     03/30/2023     02/13/2008    POTASSIUM 4.3 03/30/2023    POTASSIUM 3.9 08/15/2022    POTASSIUM 3.6 02/13/2008    CHLORIDE 107 03/30/2023    CHLORIDE 106 08/15/2022    CHLORIDE 101 02/13/2008    CO2 23 03/30/2023    CO2 24 08/15/2022    CO2 28 02/13/2008    BUN 12.7 03/30/2023    BUN 13 08/15/2022    BUN 7 02/13/2008    CR 0.99 03/30/2023    CR 0.50 02/13/2008       Lab Results   Component Value Date    BILITOTAL 1.4 03/30/2023    ALT 22 03/30/2023    AST 27 03/30/2023    ALKPHOS 52 03/30/2023       Lab Results   Component Value Date    ALBUMIN 4.7 03/30/2023    ALBUMIN 4.3 08/15/2022    PROTTOTAL 7.5 03/30/2023        Lab Results   Component Value Date    WBC 6.9 03/30/2023    WBC 15.6 02/13/2008    HGB 15.5 03/30/2023    HGB 12.3 02/13/2008    MCV 87 03/30/2023    MCV 81 02/13/2008     03/30/2023     02/13/2008       Lab Results   Component Value Date    INR 1.11 03/30/2023       Radiology  RUQ U/S Sep 2021 reviewed    Assessment  23 year old male who presents for follow-up of MASLD.  Liver function tests normal at last check.  Low clinical suspicion for cirrhosis.  Fib 4 = 0.56, low risk for advanced liver disease.  Will repeat liver function tests today and if normal, patient can follow-up with his PCP.    Plan  1.  Check CMP, INR, CBC  2.  Weight loss with diet and exercise  3.  If LFTs normal/stable, follow-up with PCP    Mitchell Mccurdy MD  Hepatology  [facility]    I spent 30 minutes on the date of the encounter doing chart review, history and exam, documentation and " further activities as noted above.      Again, thank you for allowing me to participate in the care of your patient.        Sincerely,        Mitchell Mccurdy MD

## 2023-10-23 NOTE — PROGRESS NOTES
"[facility]atology    Follow-up Visit    Follow-up visit for MASLD    Subjective:  23 year old male    Last visit with Dr. Heard in April 2023.  No new medications, ER visits or hospitalizations since last visit.    Patient is well today.  He denies any symptoms related to liver disease.    Patient denies jaundice, lower extremity edema, abdominal distension, lethargy or confusion.    Patient denies melena, hematemesis or hematochezia.    Patient denies fevers, sweats or chills.      Weight has increased a couple of pounds since last visit.  Appetite is increased due to stress resulting in stress eating.    Patient does not drink alcohol.  He is wondering if he can have a career glass of wine or beer on special occasions.    Patient lives in [place] and works as a .      Medical hx Surgical hx   Past Medical History:   Diagnosis Date     Fatty liver      Otitis media with effusion     chronic     Varicella     about age 3      Past Surgical History:   Procedure Laterality Date     PE TUBES       SURGICAL HISTORY OF -       arm surgery          Medications  Prior to Admission medications    Medication Sig Start Date End Date Taking? Authorizing Provider   hydrOXYzine (ATARAX) 25 MG tablet Take 1 tablet (25 mg) by mouth 3 times daily as needed for itching 6/21/21  Yes Toby Beaulieu MD       Allergies  Allergies   Allergen Reactions     Codeine Palpitations     Tachycardia       Review of systems  A 10-point review of systems was negative    Examination  /82 (BP Location: Right arm, Patient Position: Sitting, Cuff Size: Adult Regular)   Pulse 84   Temp 98.2  F (36.8  C) (Oral)   Resp 18   Ht 1.829 m (6' 0.01\")   Wt 101.2 kg (223 lb 1.6 oz)   SpO2 98%   BMI 30.25 kg/m    Body mass index is 30.25 kg/m .    Gen- well, NAD, A+Ox3, normal color  CVS- RRR  RS- CTA  Abd- overweight, striae, soft, non-tender, no ascites or organomegaly on palpation or percussion, " BS+  Extr- hands normal, no MATTHEW  Skin- no rash or jaundice  Neuro- no asterixis  Psych- normal mood    Laboratory  Lab Results   Component Value Date     03/30/2023     02/13/2008    POTASSIUM 4.3 03/30/2023    POTASSIUM 3.9 08/15/2022    POTASSIUM 3.6 02/13/2008    CHLORIDE 107 03/30/2023    CHLORIDE 106 08/15/2022    CHLORIDE 101 02/13/2008    CO2 23 03/30/2023    CO2 24 08/15/2022    CO2 28 02/13/2008    BUN 12.7 03/30/2023    BUN 13 08/15/2022    BUN 7 02/13/2008    CR 0.99 03/30/2023    CR 0.50 02/13/2008       Lab Results   Component Value Date    BILITOTAL 1.4 03/30/2023    ALT 22 03/30/2023    AST 27 03/30/2023    ALKPHOS 52 03/30/2023       Lab Results   Component Value Date    ALBUMIN 4.7 03/30/2023    ALBUMIN 4.3 08/15/2022    PROTTOTAL 7.5 03/30/2023        Lab Results   Component Value Date    WBC 6.9 03/30/2023    WBC 15.6 02/13/2008    HGB 15.5 03/30/2023    HGB 12.3 02/13/2008    MCV 87 03/30/2023    MCV 81 02/13/2008     03/30/2023     02/13/2008       Lab Results   Component Value Date    INR 1.11 03/30/2023       Radiology  RUQ U/S Sep 2021 reviewed    Assessment  23 year old male who presents for follow-up of MASLD.  Liver function tests normal at last check.  Low clinical suspicion for cirrhosis.  Fib 4 = 0.56, low risk for advanced liver disease.  Will repeat liver function tests today and if normal, patient can follow-up with his PCP.    Plan  1.  Check CMP, INR, CBC  2.  Weight loss with diet and exercise  3.  If LFTs normal/stable, follow-up with PCP    Mitchell Mccurdy MD  Hepatology  [facility]    I spent 30 minutes on the date of the encounter doing chart review, history and exam, documentation and further activities as noted above.

## 2023-10-24 ENCOUNTER — VIRTUAL VISIT (OUTPATIENT)
Dept: PSYCHOLOGY | Facility: CLINIC | Age: 23
End: 2023-10-24
Payer: COMMERCIAL

## 2023-10-24 DIAGNOSIS — F43.23 ADJUSTMENT DISORDER WITH MIXED ANXIETY AND DEPRESSED MOOD: Primary | ICD-10-CM

## 2023-10-24 PROCEDURE — 90837 PSYTX W PT 60 MINUTES: CPT | Mod: VID | Performed by: COUNSELOR

## 2023-10-24 NOTE — PROGRESS NOTES
[facility]                                      Progress Note    Patient Name: Jamari Cota  Date: 10/24/23         Service Type: Individual      Session Start Time: 1:00 pm  Session End Time: 1:57 pm     Session Length: 57 minutes    Session #: 26    Attendees: Client attended alone    Service Modality:  Video Visit:      Provider verified identity through the following two step process.  Patient provided:  Patient is known previously to provider    Telemedicine Visit: The patient's condition can be safely assessed and treated via synchronous audio and visual telemedicine encounter.      Reason for Telemedicine Visit: Services only offered telehealth    Originating Site (Patient Location): Patient's home     Distant Site (Provider Location): [facility] Outpatient Setting: [place]    Consent:  The patient/guardian has verbally consented to: the potential risks and benefits of telemedicine (video visit) versus in person care; bill my insurance or make self-payment for services provided; and responsibility for payment of non-covered services.     Patient would like the video invitation sent by:  My Chart    Mode of Communication:  Video Conference via Amwell    As the provider I attest to compliance with applicable laws and regulations related to telemedicine.    Distant Location (provider location):  On-site    DATA  Interactive Complexity: No  Crisis: No   Extended Session (53+ minutes): PROLONGED SERVICE IN THE OUTPATIENT SETTING REQUIRING DIRECT (FACE-TO-FACE) PATIENT CONTACT BEYOND THE USUAL SERVICE:    - Patient's presenting concerns require more intensive intervention than could be completed within the usual service      Progress Since Last Session (Related to Symptoms / Goals / Homework):   Symptoms: Improving pt endorses feeling better overall      Homework: Partially completed      Episode of Care Goals: Satisfactory progress - PREPARATION (Decided to change - considering  how); Intervened by negotiating a change plan and determining options / strategies for behavior change, identifying triggers, exploring social supports, and working towards setting a date to begin behavior change     Current / Ongoing Stressors and Concerns:  Pt is currently seeking therapy related to depression and anxiety around self-exploration related to his bi-sexual or singh sexual identity and has recently come out, which is something he is still navigating. Pt grew up in a very conservative [employer] home, where same-sex relations were not supported, which has caused him to have an internal struggle with being his authentic self. Since last session, pt reported there were not major changes or updates, other then trying to be more proactive in doing things outside therapy to explore ways to encourage himself to make changes. Pt reflected that getting different perspectives can be very helpful in dealing with his intellectualized as it makes him get out of his own head when doing so. Pt has also been really working hard on not getting caught in the negative stories he tells himself by focusing on just a few things instead of everything and feeling overwhelmed. Though pt did notice that by doing this he tends to ignore big things that he doesn't want to address. Narrative therapy tools to reframe and reword his situations/perspective may be helpful for him to start easing into addressing some of these bigger issues. Pt acknowledged that if he doesn't do something to address these at least a little he likely will be overwhelmed by them again when they inevitably come up for him. Pt also reflected on the idea of being more okay with his job situation if he was able to find things that felt more meaningful outside of work. Pt expressed that he knows he needs to get more active and work on meeting more people, but finds it incredibly challenging to do things on his own. Pt discussed possible strategies to address  this. Pt briefly discussed that he has been musing on the idea of moving somewhere else and yet recognizes that he has to many thing keeping him in [place] at this time. Overall pt is feeling more stable and in a better place overall, though recognizes that he needs to be proactive in managing his MH to sustain this. Pt did mention having some financial stress due to not being able to find a room-mate and student loans restarting, though did get a budgeting mauricio which has helped manage this stressor. Session went longer then anticipated due to pt's presenting issues.    Current coping skills-  Music  Breathing technique  D8 pen (vape) - unhelpful  Eating - unhelpful  Socializing  Reading to escape (games books dnd) - unhelpful in excess   Exercising  Singing (car singing)  Staying busy at work a lot with low-priority or off tasks - unhelpful    Look up LGBT people who are surprising to pt, and bring 3 examples - openly singh [employer] member, music artists, j[employer] youtube channel: middle ground   Make list of 5 potential jobs or job fields you may want to go into - doesn't feel he has the confidence to apply for some jobs due to internalizing some feelings of past failure.      Treatment Objective(s) Addressed in This Session:   identify current thoughts, triggers, and stressors which contribute to feelings of depression  identify unhelpful or unrealistic fears / thoughts that contribute to feeling anxious  Increase interest, engagement, and pleasure in doing things  Decrease frequency and intensity of feeling down, depressed, hopeless  Feel less tired and more energy during the day   Identify negative self-talk and behaviors: challenge core beliefs, myths, and actions  identify coping strategies for improving mood  identify two areas of life that you would like to have improved functioning     Intervention:   CBT: Discussed how he frames interactions and if that is helpful vs unhelpful   Psychodynamic: Processed  internal-experiences related to feelings of inherent conflict/competiton in social interactions and at work   Solution Focused: Identified areas in which pt is self-imposing barriers and possible strategies to overcome them.    Motivational Interviewing    MI Intervention: Expressed Empathy/Understanding, Supported Autonomy, Collaboration, Evocation, Open-ended questions, Reflections: simple and complex, Change talk (evoked), and Reframe     Change Talk Expressed by the Patient: Ability to change Reasons to change Need to change Committment to change Activation    Provider Response to Change Talk: E - Evoked more info from patient about behavior change, A - Affirmed patient's thoughts, decisions, or attempts at behavior change, R - Reflected patient's change talk, and S - Summarized patient's change talk statements        Assessments completed prior to visit:  PHQ2:       10/24/2023    12:58 PM 7/18/2023    12:57 PM 3/30/2023     7:11 AM 3/30/2023     7:10 AM 9/2/2022    10:43 AM 9/2/2022    10:37 AM 7/22/2022     1:00 PM   PHQ-2 ( 1999 [employer])   Q1: Little interest or pleasure in doing things 1 1 1 1 0 0 1   Q2: Feeling down, depressed or hopeless 1 1 1 1 0 0 1   PHQ-2 Score 2 2 2 2 0 0 2   Q1: Little interest or pleasure in doing things Several days Several days Several days    Several days   Not at all Several days   Q2: Feeling down, depressed or hopeless Several days Several days Several days    Several days   Not at all Several days   PHQ-2 Score 2 2 2    2   0 2     PHQ9:       7/13/2015    11:20 AM 12/3/2021     7:36 AM 1/24/2022    12:06 PM 2/21/2022    12:42 PM 9/2/2022    10:43 AM   PHQ-9 SCORE   PHQ-9 Total Score 17       PHQ-9 Total Score MyChart  6 (Mild depression)      PHQ-9 Total Score  6 8 6 1     GAD2:       3/18/2022    11:00 AM 6/17/2022     1:02 PM 7/1/2022    12:58 PM 7/15/2022     1:04 PM 5/31/2023    12:23 PM 10/10/2023    12:54 PM 10/24/2023    12:58 PM   MILAD-2   Feeling nervous, anxious,  or on edge 1 1    1 1 1    1 2 2 1   Not being able to stop or control worrying 1 1    1 1 1    1 1 1 1   MILAD-2 Total Score 2 2    2 2 2    2 3 3 2     GAD7:       12/3/2021     7:37 AM 1/24/2022    12:06 PM 2/21/2022    12:42 PM 9/2/2022    10:43 AM 5/31/2023    12:23 PM 10/10/2023    12:54 PM   MILAD-7 SCORE   Total Score 5 (mild anxiety)    9 (mild anxiety) 8 (mild anxiety)   Total Score 5 7 8 1 9 8     PROMIS 10-Global Health (only subscores and total score):       2/21/2022    12:42 PM 3/18/2022    11:01 AM 6/17/2022     1:02 PM 7/1/2022    12:59 PM 7/15/2022     1:05 PM 5/31/2023    12:25 PM 9/12/2023    12:58 PM   PROMIS-10 Scores Only   Global Mental Health Score 14 11 11    11 11 11    11 8 12   Global Physical Health Score 17 16 14    14 17 17    17 15 16   PROMIS TOTAL - SUBSCORES 31 27 25    25 28 28    28 23 28         ASSESSMENT: Current Emotional / Mental Status (status of significant symptoms):   Risk status (Self / Other harm or suicidal ideation)   Patient denies current fears or concerns for personal safety.   Patient denies current or recent suicidal ideation or behaviors.   Patient denies current or recent homicidal ideation or behaviors.   Patient denies current or recent self injurious behavior or ideation.   Patient denies other safety concerns.   Patient reports there has been no change in risk factors since their last session.     Patient reports there has been no change in protective factors since their last session.     Recommended that patient call 911 or go to the local ED should there be a change in any of these risk factors.     Appearance:   Appropriate    Eye Contact:   Good    Psychomotor Behavior: Normal    Attitude:   Cooperative  Interested Friendly Pleasant   Orientation:   All   Speech    Rate / Production: Normal/ Responsive Talkative    Volume:  Normal    Mood:    Depressed  Normal   Affect:    Appropriate    Thought Content:  Clear    Thought Form:  Coherent  Logical     Insight:    Good  and Intellectual Insight     Medication Review:   No changes to current psychiatric medication(s)     Medication Compliance:   Yes     Changes in Health Issues:   None reported     Chemical Use Review:   Substance Use: Chemical use reviewed, no active concerns identified      Tobacco Use: No current tobacco use.      Diagnosis:  1. Adjustment disorder with mixed anxiety and depressed mood        Collateral Reports Completed:   Not Applicable    PLAN: (Patient Tasks / Therapist Tasks / Other)  Follow-up on student loan questions  Make appointment with PCP to discuss meds (extra credit)  Look into LGBT+ volunteer opportunities   Challenge your own narrative to make it more positive or neurtral instead of negative  Look into groups you could try that are more active    MANISHA Kruger                                                       ______________________________________________________________________    Individual Treatment Plan    Patient's Name: Jamari Cota  YOB: 2000    Date of Creation: 3/4/22  Date Treatment Plan Last Reviewed/Revised: 9/12/23    DSM5 Diagnoses: Adjustment Disorders  309.28 (F43.23) With mixed anxiety and depressed mood  Psychosocial / Contextual Factors: concerns about acceptance by family, pt has started dating, financial stressors  PROMIS (reviewed every 90 days):   PROMIS 10-Global Health (only subscores and total score):       2/21/2022    12:42 PM 3/18/2022    11:01 AM 6/17/2022     1:02 PM 7/1/2022    12:59 PM 7/15/2022     1:05 PM 5/31/2023    12:25 PM 9/12/2023    12:58 PM   PROMIS-10 Scores Only   Global Mental Health Score 14 11 11    11 11 11    11 8 12   Global Physical Health Score 17 16 14    14 17 17    17 15 16   PROMIS TOTAL - SUBSCORES 31 27 25    25 28 28    28 23 28       Referral / Collaboration:  Referral to another professional/service is not indicated at this time..    Anticipated number of session for this episode of  care: 8-13  Anticipation frequency of session: Biweekly  Anticipated Duration of each session: 38-52 minutes  Treatment plan will be reviewed in 90 days or when goals have been changed.       MeasurableTreatment Goal(s) related to diagnosis / functional impairment(s)  Goal 1: Patient will develop helpful coping skills, while learning to recognize unhelpful ones, and build up his inner strength.     I will know I've met my goal when I am more able to feel more confident in myself and decisions/presetation.     Objective #A (Patient Action)    Patient will identify helpful vs unhelpful coping strategies to more effectively address stressors  exercise for 30 minutes 3 times per week for 30 minutes  Decrease frequency and intensity of feeling down, depressed, hopeless  Improve diet, appetite, mindful eating, and / or meal planning  Identify negative self-talk and behaviors: challenge core beliefs, myths, and actions  identify 5 coping strategies for improving mood  practice one mindfulness skill each day for 5-10 minutes.  Status: Continued - Date(s):  9/12/23    Intervention(s)  Therapist will assign homework related to target objective with the intent to promote pt autonomy and better manage MH.  Facilitate increase in self-awareness  Provide psycho-education on mindfulness and helpful vs. unhelpful coping identification  Teach/promote utilization of coping skill development/exploration and mindfulness techniques    Objective #B Pt will learn how to develop and identify a helpful support system.  Patient will participate in social or interpersonal activities to improve mood  attend and participate in social or recreational activities to expand social network/support  Increase interest, engagement, and pleasure in doing things  Identify negative self-talk and behaviors: challenge core beliefs, myths, and actions  track and record at least 3 pleasant exchanges with friends and new people .  Status: Restarted - Date:  "9/12/23      Intervention(s)  Therapist will assign homework related to target objective with the intent to promote pt autonomy and better manage MH.  Facilitate increase in self-awareness  Provide psycho-education on social distortions/fallacies and boundaries  Teach/promote utilization of social engagement skills and problem solving    Goal 2: Patient will be more able to engage socially and feel less anxious when interacting with others more consistently and not feel like he needs to leave.     I will know I've met my goal when I am able to be less stressed/anxious in social interactions.      Objective #A (Patient Action)    Status: Continued - Date(s): 9/12/23    Patient will identify and compliment positives at least 3 times daily to support positive self-image  identify unhelpful or unrealistic thoughts/concerns and stressors which contribute to feelings of anxiety  learn and demonstrate pro-social and communication assertiveness skill(s)  initiate 3 social contact(s) per day for increasing lengths of time  practice setting boundaries 3 times in the next several weeks  accept compliment with a \"thank you\" and no self deprecating comments.    Intervention(s)  Therapist will assign homework related to target objective with the intent to promote pt autonomy and better manage MH.  Facilitate increase in self-awareness  Provide psycho-education on social anxiety thinking and communication styles  Teach/promote utilization of thought challenging and self-confidence building    Goal 3: Patient will work on processing through self-identity and how to express his authentic or genuine self while navigating relationships, especially pre-existing ones.     I will know I've met my goal when I feel more comfortable being my authentic or genuine self with people I know.      Objective #A (Patient Action)  Status: Continued - Date(s): 9/12/23    Patient will identify communication strategies to more effectively address " stressors  use thought-stopping strategy daily to reduce intrusive thoughts  Identify negative self-talk and behaviors: challenge core beliefs, myths, and actions  identify how the use of substances contributes to the avoidance of feeling associated with the loss  identify the time in your life when you began to feel poorly about themselves.  Use positive self-affirmation daily to promote self-acceptance.    Intervention(s)  Therapist will assign homework related to target objective with the intent to promote pt autonomy and better manage MH.  Facilitate increase in self-awareness  Provide psycho-education on self-compassion and anticipatory grief and avoidance  Teach/promote utilization of self-care/acceptance and processing through internal-experiences    Patient has reviewed and agreed to the above plan.      Keaton Stein, [facility]  [facility]  March 4, 2022

## 2023-11-07 ENCOUNTER — VIRTUAL VISIT (OUTPATIENT)
Dept: PSYCHOLOGY | Facility: CLINIC | Age: 23
End: 2023-11-07
Payer: COMMERCIAL

## 2023-11-07 DIAGNOSIS — F43.23 ADJUSTMENT DISORDER WITH MIXED ANXIETY AND DEPRESSED MOOD: Primary | ICD-10-CM

## 2023-11-07 PROCEDURE — 90837 PSYTX W PT 60 MINUTES: CPT | Mod: VID | Performed by: COUNSELOR

## 2023-11-07 NOTE — PROGRESS NOTES
[facility]                                      Progress Note    Patient Name: Jamari Cota  Date: 11/07/23         Service Type: Individual      Session Start Time: 1:01 pm  Session End Time: 2:03 pm     Session Length: 62 minutes    Session #: 27    Attendees: Client attended alone    Service Modality:  Video Visit:      Provider verified identity through the following two step process.  Patient provided:  Patient is known previously to provider    Telemedicine Visit: The patient's condition can be safely assessed and treated via synchronous audio and visual telemedicine encounter.      Reason for Telemedicine Visit: Services only offered telehealth    Originating Site (Patient Location): Patient's home     Distant Site (Provider Location): [facility] Setting: [place]    Consent:  The patient/guardian has verbally consented to: the potential risks and benefits of telemedicine (video visit) versus in person care; bill my insurance or make self-payment for services provided; and responsibility for payment of non-covered services.     Patient would like the video invitation sent by:  My Chart    Mode of Communication:  Video Conference via Amwell    As the provider I attest to compliance with applicable laws and regulations related to telemedicine.    Distant Location (provider location):  On-site    DATA  Interactive Complexity: No  Crisis: No   Extended Session (53+ minutes): PROLONGED SERVICE IN THE OUTPATIENT SETTING REQUIRING DIRECT (FACE-TO-FACE) PATIENT CONTACT BEYOND THE USUAL SERVICE:    - Patient's presenting concerns require more intensive intervention than could be completed within the usual service    - High distress and under complex circumstances.  See Data section for details      Progress Since Last Session (Related to Symptoms / Goals / Homework):   Symptoms: Worsening pt endorses more anxiety      Homework: Partially completed      Episode of Care Goals:  Satisfactory progress - ACTION (Actively working towards change); Intervened by reinforcing change plan / affirming steps taken     Current / Ongoing Stressors and Concerns:  Pt is currently seeking therapy related to depression and anxiety around self-exploration related to his bi-sexual or singh sexual identity and has recently come out, which is something he is still navigating. Pt grew up in a very conservative [employer] home, where same-sex relations were not supported, which has caused him to have an internal struggle with being his authentic self. Since last session, pt reported he is dealing with more anxiety recently around social situations and is trying to navigate this. Pt has been making efforts to be more social and do things outside of his comfort zone. Pt has tried joining some online groups, which has felt like a positive step, and this resulted in entering into an unexpected online relationship with a zoraida. Pt is worried that he is overly investing into the relationship right away as he finds that any affection or interest immediately makes him feel invested. Pt discussed how his social anxiety has been getting in the way of engaging with the online groups he has been joining. After doing an exercise in session related to responding to others and reviewing critical observation skills, pt was able to recognized that he was engaging in cognitive distortions. Pt found that reading the posts aloud helped him to better recognize when this was happening. Pt processed through the relationship situation, expressing that he finds it easy to talk to this person which is rare and makes him worry about coming off as desperate which could be a form of self-sabotage. While reflecting on his situation, pt acknowledged that his issues with being physically intimate, beyond sex, is still the biggest source of anxiety for him. Pt discussed how his relationship in college with a girl really impacted his ability to be  "physically open with others. Pt was tearful when thinking back on it and admitted that it is a really hard thing to think about and he tends to avoid it. Pt verbalized being frustrated with himself for \"not being over it\" and thought that it wouldn't affect him like this since it has been years. Pt acknowledged that his experience was very impactful on him and was able to identify areas in which he still would get strong feelings when it came to touch for him. Session went longer then anticipated due to pt's presenting issues and higher distress.    Current coping skills-  Music  Breathing technique  D8 pen (vape) - unhelpful  Eating - unhelpful  Socializing  Reading to escape (games books dnd) - unhelpful in excess   Exercising  Singing (car singing)  Staying busy at work a lot with low-priority or off tasks - unhelpful    Look up LGBT people who are surprising to pt, and bring 3 examples - openly singh [employer] member, music artists, j[employer] youtube channel: middle ground   Make list of 5 potential jobs or job fields you may want to go into - doesn't feel he has the confidence to apply for some jobs due to internalizing some feelings of past failure.      Treatment Objective(s) Addressed in This Session:   identify current thoughts, triggers, and stressors which contribute to feelings of depression  identify unhelpful or unrealistic fears / thoughts that contribute to feeling anxious  Increase interest, engagement, and pleasure in doing things  Decrease frequency and intensity of feeling down, depressed, hopeless  Feel less tired and more energy during the day   Identify negative self-talk and behaviors: challenge core beliefs, myths, and actions  identify coping strategies for improving mood  identify two areas of life that you would like to have improved functioning     Intervention:   CBT: Discussed how he frames interactions and if that is helpful vs unhelpful   Psychodynamic: Processed internal-experiences related " to feelings of inherent conflict/competiton in social interactions and at work   Solution Focused: Identified areas in which pt is self-imposing barriers and possible strategies to overcome them.    Motivational Interviewing    MI Intervention: Expressed Empathy/Understanding, Supported Autonomy, Collaboration, Evocation, Open-ended questions, Reflections: simple and complex, Change talk (evoked), and Reframe     Change Talk Expressed by the Patient: Desire to change Ability to change Reasons to change Need to change Committment to change Activation Taking steps    Provider Response to Change Talk: E - Evoked more info from patient about behavior change, A - Affirmed patient's thoughts, decisions, or attempts at behavior change, R - Reflected patient's change talk, and S - Summarized patient's change talk statements        Assessments completed prior to visit:  PHQ2:       10/24/2023    12:58 PM 7/18/2023    12:57 PM 3/30/2023     7:11 AM 3/30/2023     7:10 AM 9/2/2022    10:43 AM 9/2/2022    10:37 AM 7/22/2022     1:00 PM   PHQ-2 ( 1999 [employer])   Q1: Little interest or pleasure in doing things 1 1 1 1 0 0 1   Q2: Feeling down, depressed or hopeless 1 1 1 1 0 0 1   PHQ-2 Score 2 2 2 2 0 0 2   Q1: Little interest or pleasure in doing things Several days Several days Several days    Several days   Not at all Several days   Q2: Feeling down, depressed or hopeless Several days Several days Several days    Several days   Not at all Several days   PHQ-2 Score 2 2 2    2   0 2     PHQ9:       7/13/2015    11:20 AM 12/3/2021     7:36 AM 1/24/2022    12:06 PM 2/21/2022    12:42 PM 9/2/2022    10:43 AM   PHQ-9 SCORE   PHQ-9 Total Score 17       PHQ-9 Total Score MyChart  6 (Mild depression)      PHQ-9 Total Score  6 8 6 1     GAD2:       3/18/2022    11:00 AM 6/17/2022     1:02 PM 7/1/2022    12:58 PM 7/15/2022     1:04 PM 5/31/2023    12:23 PM 10/10/2023    12:54 PM 10/24/2023    12:58 PM   MILAD-2   Feeling nervous, anxious,  or on edge 1 1    1 1 1    1 2 2 1   Not being able to stop or control worrying 1 1    1 1 1    1 1 1 1   MILAD-2 Total Score 2 2    2 2 2    2 3 3 2     GAD7:       12/3/2021     7:37 AM 1/24/2022    12:06 PM 2/21/2022    12:42 PM 9/2/2022    10:43 AM 5/31/2023    12:23 PM 10/10/2023    12:54 PM   MILAD-7 SCORE   Total Score 5 (mild anxiety)    9 (mild anxiety) 8 (mild anxiety)   Total Score 5 7 8 1 9 8     PROMIS 10-Global Health (only subscores and total score):       2/21/2022    12:42 PM 3/18/2022    11:01 AM 6/17/2022     1:02 PM 7/1/2022    12:59 PM 7/15/2022     1:05 PM 5/31/2023    12:25 PM 9/12/2023    12:58 PM   PROMIS-10 Scores Only   Global Mental Health Score 14 11 11    11 11 11    11 8 12   Global Physical Health Score 17 16 14    14 17 17    17 15 16   PROMIS TOTAL - SUBSCORES 31 27 25    25 28 28    28 23 28         ASSESSMENT: Current Emotional / Mental Status (status of significant symptoms):   Risk status (Self / Other harm or suicidal ideation)   Patient denies current fears or concerns for personal safety.   Patient denies current or recent suicidal ideation or behaviors.   Patient denies current or recent homicidal ideation or behaviors.   Patient denies current or recent self injurious behavior or ideation.   Patient denies other safety concerns.   Patient reports there has been no change in risk factors since their last session.     Patient reports there has been no change in protective factors since their last session.     Recommended that patient call 911 or go to the local ED should there be a change in any of these risk factors.     Appearance:   Appropriate    Eye Contact:   Good    Psychomotor Behavior: Normal    Attitude:   Cooperative  Interested Friendly Pleasant   Orientation:   All   Speech    Rate / Production: Normal/ Responsive Talkative    Volume:  Normal    Mood:    Anxious  Normal   Affect:    Appropriate  Tearful   Thought Content:  Clear    Thought Form:  Coherent  Logical     Insight:    Fair  and Intellectual Insight     Medication Review:   No changes to current psychiatric medication(s)     Medication Compliance:   Yes     Changes in Health Issues:   None reported     Chemical Use Review:   Substance Use: Chemical use reviewed, no active concerns identified      Tobacco Use: No current tobacco use.      Diagnosis:  1. Adjustment disorder with mixed anxiety and depressed mood      Collateral Reports Completed:   Not Applicable    PLAN: (Patient Tasks / Therapist Tasks / Other)  Make appointment with PCP to discuss meds (extra credit)  Challenge your own narrative to make it more positive or neurtral instead of negative  Look into groups you could try that are more active  Utilize critical observation skills and read posts aloud  Keep up communication lines with boyfriend    Keaton Hartine, [facility]                                                       ______________________________________________________________________    Individual Treatment Plan    Patient's Name: Jamari Cota  YOB: 2000    Date of Creation: 3/4/22  Date Treatment Plan Last Reviewed/Revised: 9/12/23    DSM5 Diagnoses: Adjustment Disorders  309.28 (F43.23) With mixed anxiety and depressed mood  Psychosocial / Contextual Factors: concerns about acceptance by family, pt has started dating, financial stressors  PROMIS (reviewed every 90 days):   PROMIS 10-Global Health (only subscores and total score):       2/21/2022    12:42 PM 3/18/2022    11:01 AM 6/17/2022     1:02 PM 7/1/2022    12:59 PM 7/15/2022     1:05 PM 5/31/2023    12:25 PM 9/12/2023    12:58 PM   PROMIS-10 Scores Only   Global Mental Health Score 14 11 11    11 11 11    11 8 12   Global Physical Health Score 17 16 14    14 17 17    17 15 16   PROMIS TOTAL - SUBSCORES 31 27 25    25 28 28    28 23 28       Referral / Collaboration:  Referral to another professional/service is not indicated at this time..    Anticipated number of session  for this episode of care: 8-13  Anticipation frequency of session: Biweekly  Anticipated Duration of each session: 38-52 minutes  Treatment plan will be reviewed in 90 days or when goals have been changed.       MeasurableTreatment Goal(s) related to diagnosis / functional impairment(s)  Goal 1: Patient will develop helpful coping skills, while learning to recognize unhelpful ones, and build up his inner strength.     I will know I've met my goal when I am more able to feel more confident in myself and decisions/presetation.     Objective #A (Patient Action)    Patient will identify helpful vs unhelpful coping strategies to more effectively address stressors  exercise for 30 minutes 3 times per week for 30 minutes  Decrease frequency and intensity of feeling down, depressed, hopeless  Improve diet, appetite, mindful eating, and / or meal planning  Identify negative self-talk and behaviors: challenge core beliefs, myths, and actions  identify 5 coping strategies for improving mood  practice one mindfulness skill each day for 5-10 minutes.  Status: Continued - Date(s):  9/12/23    Intervention(s)  Therapist will assign homework related to target objective with the intent to promote pt autonomy and better manage MH.  Facilitate increase in self-awareness  Provide psycho-education on mindfulness and helpful vs. unhelpful coping identification  Teach/promote utilization of coping skill development/exploration and mindfulness techniques    Objective #B Pt will learn how to develop and identify a helpful support system.  Patient will participate in social or interpersonal activities to improve mood  attend and participate in social or recreational activities to expand social network/support  Increase interest, engagement, and pleasure in doing things  Identify negative self-talk and behaviors: challenge core beliefs, myths, and actions  track and record at least 3 pleasant exchanges with friends and new people .  Status:  "Restarted - Date: 9/12/23      Intervention(s)  Therapist will assign homework related to target objective with the intent to promote pt autonomy and better manage MH.  Facilitate increase in self-awareness  Provide psycho-education on social distortions/fallacies and boundaries  Teach/promote utilization of social engagement skills and problem solving    Goal 2: Patient will be more able to engage socially and feel less anxious when interacting with others more consistently and not feel like he needs to leave.     I will know I've met my goal when I am able to be less stressed/anxious in social interactions.      Objective #A (Patient Action)    Status: Continued - Date(s): 9/12/23    Patient will identify and compliment positives at least 3 times daily to support positive self-image  identify unhelpful or unrealistic thoughts/concerns and stressors which contribute to feelings of anxiety  learn and demonstrate pro-social and communication assertiveness skill(s)  initiate 3 social contact(s) per day for increasing lengths of time  practice setting boundaries 3 times in the next several weeks  accept compliment with a \"thank you\" and no self deprecating comments.    Intervention(s)  Therapist will assign homework related to target objective with the intent to promote pt autonomy and better manage MH.  Facilitate increase in self-awareness  Provide psycho-education on social anxiety thinking and communication styles  Teach/promote utilization of thought challenging and self-confidence building    Goal 3: Patient will work on processing through self-identity and how to express his authentic or genuine self while navigating relationships, especially pre-existing ones.     I will know I've met my goal when I feel more comfortable being my authentic or genuine self with people I know.      Objective #A (Patient Action)  Status: Continued - Date(s): 9/12/23    Patient will identify communication strategies to more " effectively address stressors  use thought-stopping strategy daily to reduce intrusive thoughts  Identify negative self-talk and behaviors: challenge core beliefs, myths, and actions  identify how the use of substances contributes to the avoidance of feeling associated with the loss  identify the time in your life when you began to feel poorly about themselves.  Use positive self-affirmation daily to promote self-acceptance.    Intervention(s)  Therapist will assign homework related to target objective with the intent to promote pt autonomy and better manage MH.  Facilitate increase in self-awareness  Provide psycho-education on self-compassion and anticipatory grief and avoidance  Teach/promote utilization of self-care/acceptance and processing through internal-experiences    Patient has reviewed and agreed to the above plan.      Keaton Stein, LPCC  LPCC  March 4, 2022

## 2023-11-14 ENCOUNTER — DOCUMENTATION ONLY (OUTPATIENT)
Dept: PSYCHOLOGY | Facility: CLINIC | Age: 23
End: 2023-11-14
Payer: COMMERCIAL

## 2023-11-14 NOTE — PROGRESS NOTES
Case Consultation Record       Client Name: Jamari Cota   Date:  11/14/23      Diagnosis: No diagnosis found.    Therapist: MANISAH Kruger      Team Members Present:  general case consult on 11/14 run by Alycia Renae    Purpose:   General Review of Treatment    Recommendations:  Focus on life experiences being a pattern of learning and repeating or no repeating. Provide psycho-ed on sexual exploration including boundaries, consent, self-advocating. Bring it back to how to pt frames the experiences and explore why he frames it that way or how that framing is helpful or unhelpful.        MANISHA Kruger  November 14, 2023

## 2023-11-21 ENCOUNTER — VIRTUAL VISIT (OUTPATIENT)
Dept: PSYCHOLOGY | Facility: CLINIC | Age: 23
End: 2023-11-21
Payer: COMMERCIAL

## 2023-11-21 DIAGNOSIS — F43.23 ADJUSTMENT DISORDER WITH MIXED ANXIETY AND DEPRESSED MOOD: Primary | ICD-10-CM

## 2023-11-21 PROCEDURE — 90837 PSYTX W PT 60 MINUTES: CPT | Mod: VID | Performed by: COUNSELOR

## 2023-11-21 NOTE — PROGRESS NOTES
[facility]                                      Progress Note    Patient Name: Jamari Cota  Date: 11/21/23         Service Type: Individual      Session Start Time: 1:01 pm  Session End Time: 1:59 pm     Session Length: 58 minutes    Session #: 28    Attendees: Client attended alone    Service Modality:  Video Visit:      Provider verified identity through the following two step process.  Patient provided:  Patient is known previously to provider    Telemedicine Visit: The patient's condition can be safely assessed and treated via synchronous audio and visual telemedicine encounter.      Reason for Telemedicine Visit: Services only offered telehealth    Originating Site (Patient Location): Patient's home     Distant Site (Provider Location): [facility] Outpatient Setting: [place]    Consent:  The patient/guardian has verbally consented to: the potential risks and benefits of telemedicine (video visit) versus in person care; bill my insurance or make self-payment for services provided; and responsibility for payment of non-covered services.     Patient would like the video invitation sent by:  My Chart    Mode of Communication:  Video Conference via [employer]well    As the provider I attest to compliance with applicable laws and regulations related to telemedicine.    Distant Location (provider location):  On-site    DATA  Interactive Complexity: No  Crisis: No   Extended Session (53+ minutes): PROLONGED SERVICE IN THE OUTPATIENT SETTING REQUIRING DIRECT (FACE-TO-FACE) PATIENT CONTACT BEYOND THE USUAL SERVICE:    - Patient's presenting concerns require more intensive intervention than could be completed within the usual service    - High distress and under complex circumstances.  See Data section for details      Progress Since Last Session (Related to Symptoms / Goals / Homework):   Symptoms: Worsening pt endorses more anxiety and depresssion      Homework: Partially  completed      Episode of Care Goals: Satisfactory progress - PREPARATION (Decided to change - considering how); Intervened by negotiating a change plan and determining options / strategies for behavior change, identifying triggers, exploring social supports, and working towards setting a date to begin behavior change     Current / Ongoing Stressors and Concerns:  Pt is currently seeking therapy related to depression and anxiety around self-exploration related to his bi-sexual or singh sexual identity and has recently come out, which is something he is still navigating. Pt grew up in a very conservative [employer] home, where same-sex relations were not supported, which has caused him to have an internal struggle with being his authentic self. Since last session, pt has been feeling depressed again and is really struggling with not feeling alone. Pt read his journal entry that he wrote after last session, which was a step in facing his past and acknowledging that he is starting to feel ready to process what he has been through. Pt also informed therapist that his parents found some paraphernalia in his sister's room and have asked him what they should do. Pt reviewed how this is a pattern for them and when this happens it makes him feel somewhat responsible for the outcome. Pt worries about his sister going through what he went through and doesn't want that for her. After processing through his current situation with his parents, pt acknowledged that he does internalize responsibilities that aren't really his to shoulder, which adds a lot of emotional weight he needs to carry. Pt reaffirmed that he wants to live and wants to keep fighting to get better, though it can be a real struggle to find the energy. After discussing how he has been feeling and reviewing what he wrote in his journal that he shared, pt was encouraged to think about additional support like the treatment resistant depression program. Pt was open to  this idea and said he would like to talk about it more next session. Session took longer then anticipated due to presenting issue and high level of distress.     Current coping skills-  Music  Breathing technique  D8 pen (vape) - unhelpful  Eating - unhelpful  Socializing  Reading to escape (games books dnd) - unhelpful in excess   Exercising  Singing (car singing)  Staying busy at work a lot with low-priority or off tasks - unhelpful    Look up LGBT people who are surprising to pt, and bring 3 examples - openly singh [employer] member, music artists, [employer]: middle ground   Make list of 5 potential jobs or job fields you may want to go into - doesn't feel he has the confidence to apply for some jobs due to internalizing some feelings of past failure.      Treatment Objective(s) Addressed in This Session:   identify current thoughts, triggers, and stressors which contribute to feelings of depression  identify unhelpful or unrealistic fears / thoughts that contribute to feeling anxious  Increase interest, engagement, and pleasure in doing things  Decrease frequency and intensity of feeling down, depressed, hopeless  Feel less tired and more energy during the day   Identify negative self-talk and behaviors: challenge core beliefs, myths, and actions  identify coping strategies for improving mood  identify two areas of life that you would like to have improved functioning     Intervention:   CBT: Discussed how he frames interactions and if that is helpful vs unhelpful   Psychodynamic: Processed internal-experiences related to feelings of inherent conflict/competiton in social interactions and at work   Solution Focused: Identified areas in which pt is self-imposing barriers and possible strategies to overcome them.    Motivational Interviewing    MI Intervention: Expressed Empathy/Understanding, Supported Autonomy, Collaboration, Evocation, Open-ended questions, Reflections: simple and complex, Change talk  (evoked), and Reframe     Change Talk Expressed by the Patient: Desire to change Ability to change Reasons to change Need to change Committment to change Activation Taking steps    Provider Response to Change Talk: E - Evoked more info from patient about behavior change, A - Affirmed patient's thoughts, decisions, or attempts at behavior change, R - Reflected patient's change talk, and S - Summarized patient's change talk statements        Assessments completed prior to visit:  PHQ2:       10/24/2023    12:58 PM 7/18/2023    12:57 PM 3/30/2023     7:11 AM 3/30/2023     7:10 AM 9/2/2022    10:43 AM 9/2/2022    10:37 AM 7/22/2022     1:00 PM   PHQ-2 ( 1999 [employer])   Q1: Little interest or pleasure in doing things 1 1 1 1 0 0 1   Q2: Feeling down, depressed or hopeless 1 1 1 1 0 0 1   PHQ-2 Score 2 2 2 2 0 0 2   Q1: Little interest or pleasure in doing things Several days Several days Several days    Several days   Not at all Several days   Q2: Feeling down, depressed or hopeless Several days Several days Several days    Several days   Not at all Several days   PHQ-2 Score 2 2 2    2   0 2     PHQ9:       7/13/2015    11:20 AM 12/3/2021     7:36 AM 1/24/2022    12:06 PM 2/21/2022    12:42 PM 9/2/2022    10:43 AM   PHQ-9 SCORE   PHQ-9 Total Score 17       PHQ-9 Total Score MyChart  6 (Mild depression)      PHQ-9 Total Score  6 8 6 1     GAD2:       3/18/2022    11:00 AM 6/17/2022     1:02 PM 7/1/2022    12:58 PM 7/15/2022     1:04 PM 5/31/2023    12:23 PM 10/10/2023    12:54 PM 10/24/2023    12:58 PM   MILAD-2   Feeling nervous, anxious, or on edge 1 1    1 1 1    1 2 2 1   Not being able to stop or control worrying 1 1    1 1 1    1 1 1 1   MILAD-2 Total Score 2 2    2 2 2    2 3 3 2     GAD7:       12/3/2021     7:37 AM 1/24/2022    12:06 PM 2/21/2022    12:42 PM 9/2/2022    10:43 AM 5/31/2023    12:23 PM 10/10/2023    12:54 PM   MILAD-7 SCORE   Total Score 5 (mild anxiety)    9 (mild anxiety) 8 (mild anxiety)   Total Score  5 7 8 1 9 8     PROMIS 10-Global Health (only subscores and total score):       2/21/2022    12:42 PM 3/18/2022    11:01 AM 6/17/2022     1:02 PM 7/1/2022    12:59 PM 7/15/2022     1:05 PM 5/31/2023    12:25 PM 9/12/2023    12:58 PM   PROMIS-10 Scores Only   Global Mental Health Score 14 11 11    11 11 11    11 8 12   Global Physical Health Score 17 16 14    14 17 17    17 15 16   PROMIS TOTAL - SUBSCORES 31 27 25    25 28 28    28 23 28         ASSESSMENT: Current Emotional / Mental Status (status of significant symptoms):   Risk status (Self / Other harm or suicidal ideation)   Patient denies current fears or concerns for personal safety.   Patient denies current or recent suicidal ideation or behaviors.   Patient denies current or recent homicidal ideation or behaviors.   Patient denies current or recent self injurious behavior or ideation.   Patient denies other safety concerns.   Patient reports there has been no change in risk factors since their last session.     Patient reports there has been no change in protective factors since their last session.     Recommended that patient call 911 or go to the local ED should there be a change in any of these risk factors.     Appearance:   Appropriate    Eye Contact:   Good    Psychomotor Behavior: Normal    Attitude:   Cooperative  Interested Friendly Pleasant   Orientation:   All   Speech    Rate / Production: Normal/ Responsive Talkative    Volume:  Normal    Mood:    Anxious  Depressed  Normal   Affect:    Appropriate  Blunted  Worrisome    Thought Content:  Clear    Thought Form:  Coherent  Logical    Insight:    Fair      Medication Review:   No changes to current psychiatric medication(s)     Medication Compliance:   Yes     Changes in Health Issues:   None reported     Chemical Use Review:   Substance Use: Chemical use reviewed, no active concerns identified      Tobacco Use: No current tobacco use.      Diagnosis:  1. Adjustment disorder with mixed anxiety  and depressed mood        Collateral Reports Completed:   Not Applicable    PLAN: (Patient Tasks / Therapist Tasks / Other)  Make appointment with PCP to discuss meds (extra credit)  Challenge your own narrative to make it more positive or neurtral instead of negative  Look into groups you could try that are more active  Utilize critical observation skills and read posts aloud  Follow-through on plan with parents     Keaton Stein, MANISHA                                                       ______________________________________________________________________    Individual Treatment Plan    Patient's Name: Jamari Cota  YOB: 2000    Date of Creation: 3/4/22  Date Treatment Plan Last Reviewed/Revised: 9/12/23    DSM5 Diagnoses: Adjustment Disorders  309.28 (F43.23) With mixed anxiety and depressed mood  Psychosocial / Contextual Factors: concerns about acceptance by family, pt has started dating, financial stressors  PROMIS (reviewed every 90 days):   PROMIS 10-Global Health (only subscores and total score):       2/21/2022    12:42 PM 3/18/2022    11:01 AM 6/17/2022     1:02 PM 7/1/2022    12:59 PM 7/15/2022     1:05 PM 5/31/2023    12:25 PM 9/12/2023    12:58 PM   PROMIS-10 Scores Only   Global Mental Health Score 14 11 11    11 11 11    11 8 12   Global Physical Health Score 17 16 14    14 17 17    17 15 16   PROMIS TOTAL - SUBSCORES 31 27 25    25 28 28    28 23 28       Referral / Collaboration:  Referral to another professional/service is not indicated at this time..    Anticipated number of session for this episode of care: 8-13  Anticipation frequency of session: Biweekly  Anticipated Duration of each session: 38-52 minutes  Treatment plan will be reviewed in 90 days or when goals have been changed.       MeasurableTreatment Goal(s) related to diagnosis / functional impairment(s)  Goal 1: Patient will develop helpful coping skills, while learning to recognize unhelpful ones, and build up  his inner strength.     I will know I've met my goal when I am more able to feel more confident in myself and decisions/presetation.     Objective #A (Patient Action)    Patient will identify helpful vs unhelpful coping strategies to more effectively address stressors  exercise for 30 minutes 3 times per week for 30 minutes  Decrease frequency and intensity of feeling down, depressed, hopeless  Improve diet, appetite, mindful eating, and / or meal planning  Identify negative self-talk and behaviors: challenge core beliefs, myths, and actions  identify 5 coping strategies for improving mood  practice one mindfulness skill each day for 5-10 minutes.  Status: Continued - Date(s):  9/12/23    Intervention(s)  Therapist will assign homework related to target objective with the intent to promote pt autonomy and better manage MH.  Facilitate increase in self-awareness  Provide psycho-education on mindfulness and helpful vs. unhelpful coping identification  Teach/promote utilization of coping skill development/exploration and mindfulness techniques    Objective #B Pt will learn how to develop and identify a helpful support system.  Patient will participate in social or interpersonal activities to improve mood  attend and participate in social or recreational activities to expand social network/support  Increase interest, engagement, and pleasure in doing things  Identify negative self-talk and behaviors: challenge core beliefs, myths, and actions  track and record at least 3 pleasant exchanges with friends and new people .  Status: Restarted - Date: 9/12/23      Intervention(s)  Therapist will assign homework related to target objective with the intent to promote pt autonomy and better manage MH.  Facilitate increase in self-awareness  Provide psycho-education on social distortions/fallacies and boundaries  Teach/promote utilization of social engagement skills and problem solving    Goal 2: Patient will be more able to  "engage socially and feel less anxious when interacting with others more consistently and not feel like he needs to leave.     I will know I've met my goal when I am able to be less stressed/anxious in social interactions.      Objective #A (Patient Action)    Status: Continued - Date(s): 9/12/23    Patient will identify and compliment positives at least 3 times daily to support positive self-image  identify unhelpful or unrealistic thoughts/concerns and stressors which contribute to feelings of anxiety  learn and demonstrate pro-social and communication assertiveness skill(s)  initiate 3 social contact(s) per day for increasing lengths of time  practice setting boundaries 3 times in the next several weeks  accept compliment with a \"thank you\" and no self deprecating comments.    Intervention(s)  Therapist will assign homework related to target objective with the intent to promote pt autonomy and better manage MH.  Facilitate increase in self-awareness  Provide psycho-education on social anxiety thinking and communication styles  Teach/promote utilization of thought challenging and self-confidence building    Goal 3: Patient will work on processing through self-identity and how to express his authentic or genuine self while navigating relationships, especially pre-existing ones.     I will know I've met my goal when I feel more comfortable being my authentic or genuine self with people I know.      Objective #A (Patient Action)  Status: Continued - Date(s): 9/12/23    Patient will identify communication strategies to more effectively address stressors  use thought-stopping strategy daily to reduce intrusive thoughts  Identify negative self-talk and behaviors: challenge core beliefs, myths, and actions  identify how the use of substances contributes to the avoidance of feeling associated with the loss  identify the time in your life when you began to feel poorly about themselves.  Use positive self-affirmation daily " to promote self-acceptance.    Intervention(s)  Therapist will assign homework related to target objective with the intent to promote pt autonomy and better manage MH.  Facilitate increase in self-awareness  Provide psycho-education on self-compassion and anticipatory grief and avoidance  Teach/promote utilization of self-care/acceptance and processing through internal-experiences    Patient has reviewed and agreed to the above plan.      Keaton Stein, [facility]  March 4, 2022

## 2023-12-05 ENCOUNTER — VIRTUAL VISIT (OUTPATIENT)
Dept: PSYCHOLOGY | Facility: CLINIC | Age: 23
End: 2023-12-05
Payer: COMMERCIAL

## 2023-12-05 DIAGNOSIS — F43.23 ADJUSTMENT DISORDER WITH MIXED ANXIETY AND DEPRESSED MOOD: Primary | ICD-10-CM

## 2023-12-05 PROCEDURE — 90837 PSYTX W PT 60 MINUTES: CPT | Mod: VID | Performed by: COUNSELOR

## 2023-12-05 NOTE — PROGRESS NOTES
[facility]                                      Progress Note    Patient Name: Jamari Cota  Date: 12/05/23         Service Type: Individual      Session Start Time: 1:00 pm  Session End Time: 2:00 pm     Session Length: 60 minutes    Session #: 29    Attendees: Client attended alone    Service Modality:  Video Visit:      Provider verified identity through the following two step process.  Patient provided:  Patient is known previously to provider    Telemedicine Visit: The patient's condition can be safely assessed and treated via synchronous audio and visual telemedicine encounter.      Reason for Telemedicine Visit: Services only offered telehealth    Originating Site (Patient Location): Patient's home     Distant Site (Provider Location): [facility] Outpatient Setting: [place]    Consent:  The patient/guardian has verbally consented to: the potential risks and benefits of telemedicine (video visit) versus in person care; bill my insurance or make self-payment for services provided; and responsibility for payment of non-covered services.     Patient would like the video invitation sent by:  My Chart    Mode of Communication:  Video Conference via Amwell    As the provider I attest to compliance with applicable laws and regulations related to telemedicine.    Distant Location (provider location):  On-site    DATA  Interactive Complexity: No  Crisis: No   Extended Session (53+ minutes): PROLONGED SERVICE IN THE OUTPATIENT SETTING REQUIRING DIRECT (FACE-TO-FACE) PATIENT CONTACT BEYOND THE USUAL SERVICE:    - Patient's presenting concerns require more intensive intervention than could be completed within the usual service      Progress Since Last Session (Related to Symptoms / Goals / Homework):   Symptoms: Improving anxiety coping is better      Homework: Achieved / completed to satisfaction      Episode of Care Goals: Satisfactory progress - ACTION (Actively working towards  "change); Intervened by reinforcing change plan / affirming steps taken     Current / Ongoing Stressors and Concerns:  Pt is currently seeking therapy related to depression and anxiety around self-exploration related to his bi-sexual or singh sexual identity and has recently come out, which is something he is still navigating. Pt grew up in a very conservative [employer] home, where same-sex relations were not supported, which has caused him to have an internal struggle with being his authentic self. Since last session, pt reported that things have been going okay and his mood has been stable though low. Pt did talk with his sister over the holiday, and she was receptive to talking things out with him. Pt offered to help her get into therapy once she turns 16, which she expressed was something she was very interested in. Pt's holiday overall went fine and he thought he handled it well. Pt talked with his parents a bit, though was able to recognize that he can't control everything and focused on how they were constructive. After talking through it pt has noticed that he can sometimes ware himself out with his over-thinking and worrying. Pt reflected that he has a hard time discerning between immediate worries and hypothetical worries as they feel the same much of the time to him. Psycho-ed on \"what-if\" thinking and reasonable vs unreasonable worry was provided. Pt also discussed how he feels he lacks social skills in confrontation and setting boundaries/expectations. Strategies to address this were explored including scripts and expectation management skills. Pt is still dating which he thinks is going fine for now and is finding that long distance dating is different in both good and bad ways. Pt also has been pushing himself to engage with new groups online more, which has been going overall well. Session took longer then anticipated due to presenting issue and high level of distress.     Current coping " skills-  Music  Breathing technique  D8 pen (vape) - unhelpful  Eating - unhelpful  Socializing  Reading to escape (games books dnd) - unhelpful in excess   Exercising  Singing (car singing)  Staying busy at work a lot with low-priority or off tasks - unhelpful    Look up LGBT people who are surprising to pt, and bring 3 examples - openly singh [employer] member, music artists, [employer] channel: middle ground   Make list of 5 potential jobs or job fields you may want to go into - doesn't feel he has the confidence to apply for some jobs due to internalizing some feelings of past failure.      Treatment Objective(s) Addressed in This Session:   identify current thoughts, triggers, and stressors which contribute to feelings of depression  identify unhelpful or unrealistic fears / thoughts that contribute to feeling anxious  Increase interest, engagement, and pleasure in doing things  Decrease frequency and intensity of feeling down, depressed, hopeless  Feel less tired and more energy during the day   Identify negative self-talk and behaviors: challenge core beliefs, myths, and actions  identify coping strategies for improving mood  identify two areas of life that you would like to have improved functioning     Intervention:   CBT: Discussed how he frames interactions and if that is helpful vs unhelpful   Psychodynamic: Processed internal-experiences related to feelings of inherent conflict/competiton in social interactions and at work   Solution Focused: Identified areas in which pt is self-imposing barriers and possible strategies to overcome them.    Motivational Interviewing    MI Intervention: Expressed Empathy/Understanding, Supported Autonomy, Collaboration, Evocation, Open-ended questions, Reflections: simple and complex, Change talk (evoked), and Reframe     Change Talk Expressed by the Patient: Desire to change Ability to change Reasons to change Need to change Committment to change Activation Taking  steps    Provider Response to Change Talk: E - Evoked more info from patient about behavior change, A - Affirmed patient's thoughts, decisions, or attempts at behavior change, R - Reflected patient's change talk, and S - Summarized patient's change talk statements        Assessments completed prior to visit:  PHQ2:       10/24/2023    12:58 PM 7/18/2023    12:57 PM 3/30/2023     7:11 AM 3/30/2023     7:10 AM 9/2/2022    10:43 AM 9/2/2022    10:37 AM 7/22/2022     1:00 PM   PHQ-2 ( 1999 [employer])   Q1: Little interest or pleasure in doing things 1 1 1 1 0 0 1   Q2: Feeling down, depressed or hopeless 1 1 1 1 0 0 1   PHQ-2 Score 2 2 2 2 0 0 2   Q1: Little interest or pleasure in doing things Several days Several days Several days    Several days   Not at all Several days   Q2: Feeling down, depressed or hopeless Several days Several days Several days    Several days   Not at all Several days   PHQ-2 Score 2 2 2    2   0 2     PHQ9:       7/13/2015    11:20 AM 12/3/2021     7:36 AM 1/24/2022    12:06 PM 2/21/2022    12:42 PM 9/2/2022    10:43 AM   PHQ-9 SCORE   PHQ-9 Total Score 17       PHQ-9 Total Score MyChart  6 (Mild depression)      PHQ-9 Total Score  6 8 6 1     GAD2:       3/18/2022    11:00 AM 6/17/2022     1:02 PM 7/1/2022    12:58 PM 7/15/2022     1:04 PM 5/31/2023    12:23 PM 10/10/2023    12:54 PM 10/24/2023    12:58 PM   MILAD-2   Feeling nervous, anxious, or on edge 1 1    1 1 1    1 2 2 1   Not being able to stop or control worrying 1 1    1 1 1    1 1 1 1   MILAD-2 Total Score 2 2    2 2 2    2 3 3 2     GAD7:       12/3/2021     7:37 AM 1/24/2022    12:06 PM 2/21/2022    12:42 PM 9/2/2022    10:43 AM 5/31/2023    12:23 PM 10/10/2023    12:54 PM   MILAD-7 SCORE   Total Score 5 (mild anxiety)    9 (mild anxiety) 8 (mild anxiety)   Total Score 5 7 8 1 9 8     PROMIS 10-Global Health (only subscores and total score):       2/21/2022    12:42 PM 3/18/2022    11:01 AM 6/17/2022     1:02 PM 7/1/2022    12:59 PM  7/15/2022     1:05 PM 5/31/2023    12:25 PM 9/12/2023    12:58 PM   PROMIS-10 Scores Only   Global Mental Health Score 14 11 11    11 11 11    11 8 12   Global Physical Health Score 17 16 14    14 17 17    17 15 16   PROMIS TOTAL - SUBSCORES 31 27 25    25 28 28    28 23 28         ASSESSMENT: Current Emotional / Mental Status (status of significant symptoms):   Risk status (Self / Other harm or suicidal ideation)   Patient denies current fears or concerns for personal safety.   Patient denies current or recent suicidal ideation or behaviors.   Patient denies current or recent homicidal ideation or behaviors.   Patient denies current or recent self injurious behavior or ideation.   Patient denies other safety concerns.   Patient reports there has been no change in risk factors since their last session.     Patient reports there has been no change in protective factors since their last session.     Recommended that patient call 911 or go to the local ED should there be a change in any of these risk factors.     Appearance:   Appropriate    Eye Contact:   Good    Psychomotor Behavior: Normal    Attitude:   Cooperative  Interested Friendly Pleasant   Orientation:   All   Speech    Rate / Production: Normal/ Responsive    Volume:  Normal    Mood:    Anxious  Depressed  Normal   Affect:    Appropriate  Blunted  Worrisome    Thought Content:  Clear    Thought Form:  Coherent  Logical    Insight:    Good  and Intellectual Insight     Medication Review:   No changes to current psychiatric medication(s)     Medication Compliance:   Yes     Changes in Health Issues:   None reported     Chemical Use Review:   Substance Use: Chemical use reviewed, no active concerns identified      Tobacco Use: No current tobacco use.      Diagnosis:  1. Adjustment disorder with mixed anxiety and depressed mood      Collateral Reports Completed:   Not Applicable    PLAN: (Patient Tasks / Therapist Tasks / Other)  Make appointment with PCP to  discuss meds (extra credit)  Try using expectation management approach with boundaries  Continue to engage with new people online  Utilize critical observation skills and read posts aloud    Keaton Stein, [facility]                                                       ______________________________________________________________________    Individual Treatment Plan    Patient's Name: Jamari Cota  YOB: 2000    Date of Creation: 3/4/22  Date Treatment Plan Last Reviewed/Revised: 9/12/23    DSM5 Diagnoses: Adjustment Disorders  309.28 (F43.23) With mixed anxiety and depressed mood  Psychosocial / Contextual Factors: concerns about acceptance by family, pt has started dating, financial stressors  PROMIS (reviewed every 90 days):   PROMIS 10-Global Health (only subscores and total score):       2/21/2022    12:42 PM 3/18/2022    11:01 AM 6/17/2022     1:02 PM 7/1/2022    12:59 PM 7/15/2022     1:05 PM 5/31/2023    12:25 PM 9/12/2023    12:58 PM   PROMIS-10 Scores Only   Global Mental Health Score 14 11 11    11 11 11    11 8 12   Global Physical Health Score 17 16 14    14 17 17    17 15 16   PROMIS TOTAL - SUBSCORES 31 27 25    25 28 28    28 23 28       Referral / Collaboration:  Referral to another professional/service is not indicated at this time..    Anticipated number of session for this episode of care: 8-13  Anticipation frequency of session: Biweekly  Anticipated Duration of each session: 38-52 minutes  Treatment plan will be reviewed in 90 days or when goals have been changed.       MeasurableTreatment Goal(s) related to diagnosis / functional impairment(s)  Goal 1: Patient will develop helpful coping skills, while learning to recognize unhelpful ones, and build up his inner strength.     I will know I've met my goal when I am more able to feel more confident in myself and decisions/presetation.     Objective #A (Patient Action)    Patient will identify helpful vs unhelpful coping strategies  to more effectively address stressors  exercise for 30 minutes 3 times per week for 30 minutes  Decrease frequency and intensity of feeling down, depressed, hopeless  Improve diet, appetite, mindful eating, and / or meal planning  Identify negative self-talk and behaviors: challenge core beliefs, myths, and actions  identify 5 coping strategies for improving mood  practice one mindfulness skill each day for 5-10 minutes.  Status: Continued - Date(s):  9/12/23    Intervention(s)  Therapist will assign homework related to target objective with the intent to promote pt autonomy and better manage MH.  Facilitate increase in self-awareness  Provide psycho-education on mindfulness and helpful vs. unhelpful coping identification  Teach/promote utilization of coping skill development/exploration and mindfulness techniques    Objective #B Pt will learn how to develop and identify a helpful support system.  Patient will participate in social or interpersonal activities to improve mood  attend and participate in social or recreational activities to expand social network/support  Increase interest, engagement, and pleasure in doing things  Identify negative self-talk and behaviors: challenge core beliefs, myths, and actions  track and record at least 3 pleasant exchanges with friends and new people .  Status: Restarted - Date: 9/12/23      Intervention(s)  Therapist will assign homework related to target objective with the intent to promote pt autonomy and better manage MH.  Facilitate increase in self-awareness  Provide psycho-education on social distortions/fallacies and boundaries  Teach/promote utilization of social engagement skills and problem solving    Goal 2: Patient will be more able to engage socially and feel less anxious when interacting with others more consistently and not feel like he needs to leave.     I will know I've met my goal when I am able to be less stressed/anxious in social interactions.   "    Objective #A (Patient Action)    Status: Continued - Date(s): 9/12/23    Patient will identify and compliment positives at least 3 times daily to support positive self-image  identify unhelpful or unrealistic thoughts/concerns and stressors which contribute to feelings of anxiety  learn and demonstrate pro-social and communication assertiveness skill(s)  initiate 3 social contact(s) per day for increasing lengths of time  practice setting boundaries 3 times in the next several weeks  accept compliment with a \"thank you\" and no self deprecating comments.    Intervention(s)  Therapist will assign homework related to target objective with the intent to promote pt autonomy and better manage MH.  Facilitate increase in self-awareness  Provide psycho-education on social anxiety thinking and communication styles  Teach/promote utilization of thought challenging and self-confidence building    Goal 3: Patient will work on processing through self-identity and how to express his authentic or genuine self while navigating relationships, especially pre-existing ones.     I will know I've met my goal when I feel more comfortable being my authentic or genuine self with people I know.      Objective #A (Patient Action)  Status: Continued - Date(s): 9/12/23    Patient will identify communication strategies to more effectively address stressors  use thought-stopping strategy daily to reduce intrusive thoughts  Identify negative self-talk and behaviors: challenge core beliefs, myths, and actions  identify how the use of substances contributes to the avoidance of feeling associated with the loss  identify the time in your life when you began to feel poorly about themselves.  Use positive self-affirmation daily to promote self-acceptance.    Intervention(s)  Therapist will assign homework related to target objective with the intent to promote pt autonomy and better manage MH.  Facilitate increase in self-awareness  Provide " psycho-education on self-compassion and anticipatory grief and avoidance  Teach/promote utilization of self-care/acceptance and processing through internal-experiences    Patient has reviewed and agreed to the above plan.      Keaton Stein, LPCC  LPCC  March 4, 2022

## 2023-12-19 ENCOUNTER — VIRTUAL VISIT (OUTPATIENT)
Dept: PSYCHOLOGY | Facility: CLINIC | Age: 23
End: 2023-12-19
Payer: COMMERCIAL

## 2023-12-19 DIAGNOSIS — F43.23 ADJUSTMENT DISORDER WITH MIXED ANXIETY AND DEPRESSED MOOD: Primary | ICD-10-CM

## 2023-12-19 PROCEDURE — 90837 PSYTX W PT 60 MINUTES: CPT | Mod: VID | Performed by: COUNSELOR

## 2023-12-19 NOTE — PROGRESS NOTES
[facility]                                      Progress Note    Patient Name: Jamari Cota  Date: 12/19/23         Service Type: Individual      Session Start Time: 1:00 pm  Session End Time: 1:59 pm     Session Length: 59 minutes    Session #: 30    Attendees: Client attended alone    Service Modality:  Video Visit:      Provider verified identity through the following two step process.  Patient provided:  Patient is known previously to provider    Telemedicine Visit: The patient's condition can be safely assessed and treated via synchronous audio and visual telemedicine encounter.      Reason for Telemedicine Visit: Services only offered telehealth    Originating Site (Patient Location): Patient's home     Distant Site (Provider Location): [facility] Outpatient Setting: [place]    Consent:  The patient/guardian has verbally consented to: the potential risks and benefits of telemedicine (video visit) versus in person care; bill my insurance or make self-payment for services provided; and responsibility for payment of non-covered services.     Patient would like the video invitation sent by:  My Chart    Mode of Communication:  Video Conference via [employer]well    As the provider I attest to compliance with applicable laws and regulations related to telemedicine.    Distant Location (provider location):  On-site    DATA  Interactive Complexity: No  Crisis: No   Extended Session (53+ minutes): PROLONGED SERVICE IN THE OUTPATIENT SETTING REQUIRING DIRECT (FACE-TO-FACE) PATIENT CONTACT BEYOND THE USUAL SERVICE:    - Patient's presenting concerns require more intensive intervention than could be completed within the usual service    - High distress and under complex circumstances.  See Data section for details      Progress Since Last Session (Related to Symptoms / Goals / Homework):   Symptoms: Worsening pt has been feeling depressed      Homework: Achieved / completed to  satisfaction      Episode of Care Goals: Satisfactory progress - CONTEMPLATION (Considering change and yet undecided); Intervened by assessing the negative and positive thinking (ambivalence) about behavior change     Current / Ongoing Stressors and Concerns:  Pt is currently seeking therapy related to depression and anxiety around self-exploration related to his bi-sexual or singh sexual identity and has recently come out, which is something he is still navigating. Pt grew up in a very conservative Latter-day home, where same-sex relations were not supported, which has caused him to have an internal struggle with being his authentic self. Since last session, pt reported that things have been pretty stable mood wise and he has been doing okay with no big surprises. Pt had to travel for work, which went fine, though pt did come back home sick. Pt did express that he felt like he didn't belong at the holiday party that happened during the trip, as he felt he didn't fit in with the crowd. Pt also verbalized feeling down that he wasn't invited to the holiday party at his own office. Pt clarified that these experiences likely aren't intentional and yet they make him feel like he doesn't fit in, which reinforces this internal narrative his brain tells him that he can't fit in anywhere. Pt plans to celebrate the holidays with family. Pt verbalized that he's not really a big fan of this time of year as he feels there is a lot of expectations placed on him, which makes everything feel like a chore. Pt expressed that this time of year has always been kind of a downer for him as he feels people tend to be angry or unhappy around this time of year in his experience. Pt overall expressed that he feels stuck again and his internal depression voice has been getting louder. After discussing it, pt agreed that he needs to talk about medication with his PCP. Pt expressed that the trx resistant program felt more intense then he thinks he  was looking for, though pt was encouraged to consider it further if he is still feeling stuck. Pt did identify that the social experiences online have been more positive recently and that things with his long distance boyfriend have been going well. They have talked about his boyfriend coming to visit in April next year and pt has been working on not engaging with his anxious thoughts around their relationship. Pt does think that he is getting better at recognizing reasonable worry vs. unreasonable worry.  Session took longer then anticipated due to presenting issue and high level of distress. Trx plan was updated.    Current coping skills-  Music  Breathing technique  D8 pen (vape) - unhelpful  Eating - unhelpful  Socializing  Reading to escape (games books dnd) - unhelpful in excess   Exercising  Singing (car singing)  Staying busy at work a lot with low-priority or off tasks - unhelpful    Look up LGBT people who are surprising to pt, and bring 3 examples - openly singh [employer] member, music artists, j[employer] youtube channel: middle ground   Make list of 5 potential jobs or job fields you may want to go into - doesn't feel he has the confidence to apply for some jobs due to internalizing some feelings of past failure.      Treatment Objective(s) Addressed in This Session:   identify current thoughts, triggers, and stressors which contribute to feelings of depression  identify unhelpful or unrealistic fears / thoughts that contribute to feeling anxious  Increase interest, engagement, and pleasure in doing things  Decrease frequency and intensity of feeling down, depressed, hopeless  Feel less tired and more energy during the day   Identify negative self-talk and behaviors: challenge core beliefs, myths, and actions  identify coping strategies for improving mood  identify two areas of life that you would like to have improved functioning     Intervention:   CBT: Discussed how he frames interactions and if that is helpful  vs unhelpful   Psychodynamic: Processed internal-experiences related to feelings of inherent conflict/competiton in social interactions and at work   Solution Focused: Identified areas in which pt is self-imposing barriers and possible strategies to overcome them.    Motivational Interviewing    MI Intervention: Expressed Empathy/Understanding, Supported Autonomy, Collaboration, Evocation, Permission to raise concern or advise, Open-ended questions, Reflections: simple and complex, Change talk (evoked), and Reframe     Change Talk Expressed by the Patient: Ability to change Reasons to change Need to change Committment to change Activation Taking steps    Provider Response to Change Talk: E - Evoked more info from patient about behavior change, A - Affirmed patient's thoughts, decisions, or attempts at behavior change, R - Reflected patient's change talk, and S - Summarized patient's change talk statements      Assessments completed prior to visit:  PHQ2:       10/24/2023    12:58 PM 7/18/2023    12:57 PM 3/30/2023     7:11 AM 3/30/2023     7:10 AM 9/2/2022    10:43 AM 9/2/2022    10:37 AM 7/22/2022     1:00 PM   PHQ-2 ( 1999 [employer])   Q1: Little interest or pleasure in doing things 1 1 1 1 0 0 1   Q2: Feeling down, depressed or hopeless 1 1 1 1 0 0 1   PHQ-2 Score 2 2 2 2 0 0 2   Q1: Little interest or pleasure in doing things Several days Several days Several days    Several days   Not at all Several days   Q2: Feeling down, depressed or hopeless Several days Several days Several days    Several days   Not at all Several days   PHQ-2 Score 2 2 2    2   0 2     PHQ9:       7/13/2015    11:20 AM 12/3/2021     7:36 AM 1/24/2022    12:06 PM 2/21/2022    12:42 PM 9/2/2022    10:43 AM   PHQ-9 SCORE   PHQ-9 Total Score 17       PHQ-9 Total Score MyChart  6 (Mild depression)      PHQ-9 Total Score  6 8 6 1     GAD2:       3/18/2022    11:00 AM 6/17/2022     1:02 PM 7/1/2022    12:58 PM 7/15/2022     1:04 PM 5/31/2023     12:23 PM 10/10/2023    12:54 PM 10/24/2023    12:58 PM   MILAD-2   Feeling nervous, anxious, or on edge 1 1    1 1 1    1 2 2 1   Not being able to stop or control worrying 1 1    1 1 1    1 1 1 1   MILAD-2 Total Score 2 2    2 2 2    2 3 3 2     GAD7:       12/3/2021     7:37 AM 1/24/2022    12:06 PM 2/21/2022    12:42 PM 9/2/2022    10:43 AM 5/31/2023    12:23 PM 10/10/2023    12:54 PM   MILAD-7 SCORE   Total Score 5 (mild anxiety)    9 (mild anxiety) 8 (mild anxiety)   Total Score 5 7 8 1 9 8     PROMIS 10-Global Health (only subscores and total score):       3/18/2022    11:01 AM 6/17/2022     1:02 PM 7/1/2022    12:59 PM 7/15/2022     1:05 PM 5/31/2023    12:25 PM 9/12/2023    12:58 PM 12/19/2023    12:52 PM   PROMIS-10 Scores Only   Global Mental Health Score 11 11    11 11 11    11 8 12 10   Global Physical Health Score 16 14    14 17 17    17 15 16 15   PROMIS TOTAL - SUBSCORES 27 25    25 28 28    28 23 28 25         ASSESSMENT: Current Emotional / Mental Status (status of significant symptoms):   Risk status (Self / Other harm or suicidal ideation)   Patient denies current fears or concerns for personal safety.   Patient denies current or recent suicidal ideation or behaviors.   Patient denies current or recent homicidal ideation or behaviors.   Patient denies current or recent self injurious behavior or ideation.   Patient denies other safety concerns.   Patient reports there has been no change in risk factors since their last session.     Patient reports there has been no change in protective factors since their last session.     Recommended that patient call 911 or go to the local ED should there be a change in any of these risk factors.     Appearance:   Appropriate    Eye Contact:   Good    Psychomotor Behavior: Normal    Attitude:   Cooperative  Interested Friendly Pleasant   Orientation:   All   Speech    Rate / Production: Normal/ Responsive Emotional Talkative    Volume:  Normal    Mood:    Depressed   Normal   Affect:    Appropriate  Blunted    Thought Content:  Clear    Thought Form:  Coherent  Logical    Insight:    Good  and Intellectual Insight     Medication Review:   No current psychiatric medications prescribed     Medication Compliance:   NA     Changes in Health Issues:   None reported     Chemical Use Review:   Substance Use: Chemical use reviewed, no active concerns identified      Tobacco Use: No current tobacco use.      Diagnosis:  1. Adjustment disorder with mixed anxiety and depressed mood        Collateral Reports Completed:   Not Applicable    PLAN: (Patient Tasks / Therapist Tasks / Other)  Make appointment with PCP to discuss meds (extra credit)  Try using expectation management approach with boundaries  Continue to engage with new people online  Utilize critical observation skills and read posts aloud    Keaton Stein [facility]                                                       ______________________________________________________________________    Individual Treatment Plan    Patient's Name: Jamari Cota  YOB: 2000    Date of Creation: 3/4/22  Date Treatment Plan Last Reviewed/Revised: 12/19/23    DSM5 Diagnoses: 300.4 (F34.1) Persistent Depressive Disorder, With intermittent major depressive episodes, without current episode or Adjustment Disorders  309.28 (F43.23) With mixed anxiety and depressed mood  Psychosocial / Contextual Factors: CIS Male, , GLBT+ (Bi or [place]), Employed full time, in a LD relationship, and has complex family dynamics that are not supportive    PROMIS (reviewed every 90 days):   PROMIS 10-Global Health (only subscores and total score):       3/18/2022    11:01 AM 6/17/2022     1:02 PM 7/1/2022    12:59 PM 7/15/2022     1:05 PM 5/31/2023    12:25 PM 9/12/2023    12:58 PM 12/19/2023    12:52 PM   PROMIS-10 Scores Only   Global Mental Health Score 11 11    11 11 11    11 8 12 10   Global Physical Health Score 16 14    14 17 17    17 15 16 15    PROMIS TOTAL - SUBSCORES 27 25    25 28 28    28 23 28 25       Referral / Collaboration:  Referral to another professional/service is not indicated at this time..    Anticipated number of session for this episode of care: 8-13  Anticipation frequency of session: Biweekly  Anticipated Duration of each session: 38-52 minutes  Treatment plan will be reviewed in 90 days or when goals have been changed.       MeasurableTreatment Goal(s) related to diagnosis / functional impairment(s)  Goal 1: Patient will develop helpful coping skills, while learning to recognize unhelpful ones, and build up his inner strength.     I will know I've met my goal when I am more able to feel more confident in myself and decisions/presetation.     Objective #A (Patient Action)    Patient will identify helpful vs unhelpful coping strategies to more effectively address stressors  exercise for 30 minutes 3 times per week for 30 minutes  Decrease frequency and intensity of feeling down, depressed, hopeless  Improve diet, appetite, mindful eating, and / or meal planning  Identify negative self-talk and behaviors: challenge core beliefs, myths, and actions  identify 5 coping strategies for improving mood  practice one mindfulness skill each day for 5-10 minutes.  Status: Continued - Date(s):  12/19/23    Intervention(s)  Therapist will assign homework related to target objective with the intent to promote pt autonomy and better manage MH.  Facilitate increase in self-awareness  Provide psycho-education on mindfulness and helpful vs. unhelpful coping identification  Teach/promote utilization of coping skill development/exploration and mindfulness techniques    Objective #B Pt will learn how to develop and identify a helpful support system.  Patient will participate in social or interpersonal activities to improve mood  attend and participate in social or recreational activities to expand social network/support  Increase interest, engagement, and  "pleasure in doing things  Identify negative self-talk and behaviors: challenge core beliefs, myths, and actions  track and record at least 3 pleasant exchanges with friends and new people .  Status: Completed - Date: 12/19/23      Intervention(s)  Therapist will assign homework related to target objective with the intent to promote pt autonomy and better manage MH.  Facilitate increase in self-awareness  Provide psycho-education on social distortions/fallacies and boundaries  Teach/promote utilization of social engagement skills and problem solving    Goal 2: Patient will be more able to engage socially and feel less anxious when interacting with others more consistently and not feel like he needs to leave.     I will know I've met my goal when I am able to be less stressed/anxious in social interactions.      Objective #A (Patient Action)    Status: Continued - Date(s): 12/19/23    Patient will identify and compliment positives at least 3 times daily to support positive self-image  identify unhelpful or unrealistic thoughts/concerns and stressors which contribute to feelings of anxiety  learn and demonstrate pro-social and communication assertiveness skill(s)  initiate 3 social contact(s) per day for increasing lengths of time  practice setting boundaries 3 times in the next several weeks  accept compliment with a \"thank you\" and no self deprecating comments.    Intervention(s)  Therapist will assign homework related to target objective with the intent to promote pt autonomy and better manage MH.  Facilitate increase in self-awareness  Provide psycho-education on social anxiety thinking and communication styles  Teach/promote utilization of thought challenging and self-confidence building    Goal 3: Patient will work on processing through self-identity and how to express his authentic or genuine self while navigating relationships, especially pre-existing ones.     I will know I've met my goal when I feel more " comfortable being my authentic or genuine self with people I know.      Objective #A (Patient Action)  Status: Continued - Date(s): 12/19/23    Patient will identify communication strategies to more effectively address stressors  use thought-stopping strategy daily to reduce intrusive thoughts  Identify negative self-talk and behaviors: challenge core beliefs, myths, and actions  identify how the use of substances contributes to the avoidance of feeling associated with the loss  identify the time in your life when you began to feel poorly about themselves.  Use positive self-affirmation daily to promote self-acceptance.    Intervention(s)  Therapist will assign homework related to target objective with the intent to promote pt autonomy and better manage MH.  Facilitate increase in self-awareness  Provide psycho-education on self-compassion and anticipatory grief and avoidance  Teach/promote utilization of self-care/acceptance and processing through internal-experiences    Patient has reviewed and agreed to the above plan.      Keaton Stein, [facility]  March 4, 2022

## 2024-04-08 ENCOUNTER — OFFICE VISIT (OUTPATIENT)
Dept: DERMATOLOGY | Facility: CLINIC | Age: 24
End: 2024-04-08
Attending: PHYSICIAN ASSISTANT
Payer: COMMERCIAL

## 2024-04-08 DIAGNOSIS — D22.9 MULTIPLE BENIGN NEVI: ICD-10-CM

## 2024-04-08 DIAGNOSIS — L71.9 ROSACEA: ICD-10-CM

## 2024-04-08 DIAGNOSIS — L98.9 SKIN LESION: Primary | ICD-10-CM

## 2024-04-08 DIAGNOSIS — D48.9 NEOPLASM OF UNCERTAIN BEHAVIOR: ICD-10-CM

## 2024-04-08 PROCEDURE — 88305 TISSUE EXAM BY PATHOLOGIST: CPT | Performed by: DERMATOLOGY

## 2024-04-08 PROCEDURE — 11440 EXC FACE-MM B9+MARG 0.5 CM/<: CPT | Performed by: STUDENT IN AN ORGANIZED HEALTH CARE EDUCATION/TRAINING PROGRAM

## 2024-04-08 PROCEDURE — 12051 INTMD RPR FACE/MM 2.5 CM/<: CPT | Mod: 59 | Performed by: STUDENT IN AN ORGANIZED HEALTH CARE EDUCATION/TRAINING PROGRAM

## 2024-04-08 PROCEDURE — 99204 OFFICE O/P NEW MOD 45 MIN: CPT | Mod: 25 | Performed by: STUDENT IN AN ORGANIZED HEALTH CARE EDUCATION/TRAINING PROGRAM

## 2024-04-08 NOTE — LETTER
"April 11, 2024      Jamari Cota  [address]  [place] [address]        Dear ,    We are writing to inform you of your test results.    Biopsy returned as a benign mole just like we discussed.  No further treatment is needed.  I hope you are healing well.    Resulted Orders   Dermatological Path Order and Indications   Result Value Ref Range    Case Report       Surgical Pathology Report                         Case: [id number]                                  Authorizing Provider:  Shorty Lomas MD          Collected:           04/08/2024 07:24 AM          Ordering Location:     [facility]   Received:            04/08/2024 04:57 PM                                 [facility]                                                           Pathologist:           Jone Lovell MD                                                       Specimen:    Skin, L NLF                                                                                Final Diagnosis       Left nasolabial fold:  - Intradermal melanocytic nevus - (see description)        Clinical Information       The patient is a 24 year old male.       Gross Description       A(1). Skin, L NLF:  The specimen is received in formalin with proper patient identification, labeled \"L NLF\".  The specimen consists of a 0.4 cm in diameter by 0.3 cm in depth skin punch biopsy.  The skin surface contains a 0.4 x 0.4 x 0.1 cm tan-white, raised lesion.  The resection margin is inked and it is bisected and submitted in A1.       Microscopic Description       The specimen exhibits a predominantly intradermal proliferation of melanocytes in nests and cords which mature with descent. The lesion focally extends to the lateral margin.       Performing Labs       The technical component of this testing was completed at [facility]         If you have any questions or concerns, please " call the clinic at the number listed above.       Sincerely,      Shorty Lomas MD

## 2024-04-08 NOTE — PATIENT INSTRUCTIONS
Wound Care After a Biopsy    What is a skin biopsy?  A skin biopsy allows the doctor to examine a very small piece of tissue under the microscope to determine the diagnosis and the best treatment for the skin condition. A local anesthetic (numbing medicine) is injected with a very small needle into the skin area to be tested. A small piece of skin is taken from the area. Sometimes a suture (stitch) is used.     What are the risks of a skin biopsy?  I will experience scar, bleeding, swelling, pain, crusting and redness. I may experience incomplete removal or recurrence. Risks of this procedure are excessive bleeding, bruising, infection, nerve damage, numbness, thick (hypertrophic or keloidal) scar and non-diagnostic biopsy.    How should I care for my wound for the first 24 hours?  Keep the wound dry and covered for 24 hours  If it bleeds, hold direct pressure on the area for 15 minutes. If bleeding does not stop, call us or go to the emergency room  Avoid strenuous exercise the first 1-2 days or as your doctor instructs you    How should I care for the wound after 24 hours?  After 24 hours, remove the bandage  You may bathe or shower as normal  If you had a scalp biopsy, you can shampoo as usual and can use shower water to clean the biopsy site daily  Clean the wound once a day with gentle soap and water  Do not scrub, be gentle  Apply white petroleum/Vaseline after cleaning the wound with a cotton swab or a clean finger, and keep the site covered with a Bandaid /bandage. Bandages are not necessary with a scalp biopsy  If you are unable to cover the site with a Bandaid /bandage, re-apply ointment 2-3 times a day to keep the site moist. Moisture will help with healing  Avoid strenuous activity for first 1-2 days  Avoid lakes, rivers, pools, and oceans until the stitches are removed or the site is healed    How do I clean my wound?  Wash hands thoroughly with soap or use hand  before all wound care  Clean  the wound with gentle soap and water  Apply white petroleum/Vaseline  to wound after it is clean  Replace the Bandaid /bandage to keep the wound covered for the first few days or as instructed by your doctor  If you had a scalp biopsy, warm shower water to the area on a daily basis should suffice    What should I use to clean my wound?   Cotton-tipped applicators (Qtips )  White petroleum jelly (Vaseline ). Use a clean new container and use Q-tips to apply.  Bandaids  as needed  Gentle soap     How should I care for my wound long term?  Do not get your wound dirty  Keep up with wound care for one week or until the area is healed.  If you have stitches, stitches need to be removed in 7 days. You may return to our clinic for this or you may have it done locally at your doctor s office.  A small scab will form and fall off by itself when the area is completely healed. The area will be red and will become pink in color as it heals. Sun protection is very important for how your scar will turn out. Sunscreen with an SPF 30 or greater is recommended once the area is healed.  You should have some soreness but it should be mild and slowly go away over several days. Talk to your doctor about using tylenol for pain,    When should I call my doctor?  If you have increased:   Pain or swelling  Pus or drainage (clear or slightly yellow drainage is ok)  Temperature over 100F  Spreading redness or warmth around wound    When will I hear about my results?  The biopsy results can take 2 weeks to come back.  Your results will automatically release to [employer] before your provider has even reviewed them.  The clinic will call you with the results, send you a [employer] message, or have you schedule a follow-up clinic or phone time to discuss the results.  Contact our clinics if you do not hear from us in 2 weeks.    Who should I call with questions?  [facility]: [phone]  [facility]  [place]: [phone]  For urgent needs outside of business hours call the [facility] at [phone] and ask for the dermatology resident on call

## 2024-04-08 NOTE — LETTER
4/8/2024         RE: Jamari Cota  [address]  [facility] [address]        Dear Colleague,    Thank you for referring your patient, Jamari Cota, to the [facility]. Please see a copy of my visit note below.    DERMATOLOGIC SURGERY REPORT    NAME OF PROCEDURE:  EXCISION AND CLOSURE    Surgeon:  Shorty Lomas MD    PREOPERATIVE DIAGNOSIS: NUB  POSTOPERATIVE DIAGNOSIS: Same  Lesion Size: 4mm lesion with 0mm margins  Final excision size: 4mm  Repair type: Intermediate  FINAL REPAIR LENGTH:  4 mm  Location: Face  Prior Biopsy Accession #: n/[id number]    INDICATIONS:Excision was indicated for treatment. We discussed the principles of treatment and most likely complications including bleeding, infection, wound dehiscence, pain, nerve damage, and scarring. Informed consent was obtained and the patient underwent the procedure as follows.    PROCEDURE:  The patient was taken to the operative suite. The treatment area was anesthetized with 1% lidocaine with 1:071303 epinephrine buffered with bicarbonate. The area was washed with hibiclens, rinsed with saline and draped with sterile towels. The lesion was delineated and excised down to subcutaneous fat using a 4mm punch tool. Hemostasis was obtained by electrocoagulation.     REPAIR:  In order to repair this defect while maintaining the normal anatomic relations and function, we elected to utilize a linear closure. Closure was oriented so that the wound was in the patient's natural skin tension lines. T Deep dermal and subcutaneous layer closure was performed using 4-0 monocryl deep, intradermal and subcutaneous sutures.  Final cutaneous approximation was achieved with 5-0 fast gut simple interrupted sutures.      A total of 2mL of anesthesia was administered for all surgical sites. Estimated blood loss was less than 5 mL for all surgical sites. A sterile pressure dressing was applied and wound care instructions, with a written handout,  were given. The patient was discharged alert and ambulatory.    Shorty Lomas M.D.      Department of Dermatology       Again, thank you for allowing me to participate in the care of your patient.        Sincerely,        Shorty Lomas MD

## 2024-04-08 NOTE — PROGRESS NOTES
[facility] Dermatology Note    Encounter Date: Apr 8, 2024    Dermatology Problem List:    ______________________________________    Impression/Plan:  Jamari was seen today for derm problem.    Diagnoses and all orders for this visit:      Neoplasm of uncertain behavior  -     Dermatological Path Order and Indications; Standing  -     Dermatological Path Order and Indications  -     EXC BENIGN SKIN LESION FACE/EARS <=0.5 CM  -     REPAIR INTERMED, WOUND FACE/EARS <=2.5 CM  - getting caught on razor when shaving, bleeding  - for relief of intolerable sx and dx excision performed     Rosacea  - ET, declines prescription topicals  - Reviewed the compounding benefits of incremental changes to sun protective clothing behaviors including increased frequency of sunscreen and sun protective clothing like broad brimmed hats and longsleeved UPF containing clothing    Multiple benign nevi  - Reviewed the compounding benefits of incremental changes to sun protective clothing behaviors including increased frequency of sunscreen and sun protective clothing like broad brimmed hats and longsleeved UPF containing clothing        Follow-up PRN.       Staff Involved:  Staff Only    Shorty Lomas MD   of Dermatology  Department of Dermatology  [facility]      CC:   Chief Complaint   Patient presents with    Derm Problem     Lesion on face        History of Present Illness:  Mr. Jamari Cota is a 24 year old male who presents as a new patient.    Patient presents today for referral that was placed all the way back in September.  He has a facial papule in the left medial cheek that he states gets caught on his razor when he shaves.  It is painful and bleeds when it happens.    Labs:      Physical exam:  Vitals: There were no vitals taken for this visit.  GEN: well developed, well-nourished, in no acute distress, in a pleasant mood.     SKIN: Vera phototype  1  - Sun-exposed skin, which includes the head/face, neck, both arms, digits, and/or nails was examined.   - erythema and telangiectasia involving forehead, cheeks, and chin   - scattered brown papules on trunk and extremities   - No other lesions of concern on areas examined.     Past Medical History:   Past Medical History:   Diagnosis Date    Fatty liver     Otitis media with effusion     chronic    Varicella     about age 3     Past Surgical History:   Procedure Laterality Date    PE TUBES      SURGICAL HISTORY OF -       arm surgery       Social History:   reports that he has never smoked. He has never used smokeless tobacco. He reports that he does not drink alcohol and does not use drugs.    Family History:  Family History   Problem Relation Age of Onset    Hyperlipidemia Father     Diabetes Paternal Grandmother         Type II    Hyperlipidemia Paternal Grandmother     Diabetes Paternal Grandfather         Type II    Hyperlipidemia Paternal Grandfather     Cancer Maternal Uncle         eye cancer    Hypertension No family hx of     Coronary Artery Disease No family hx of     Breast Cancer No family hx of     Colon Cancer No family hx of     Prostate Cancer No family hx of        Medications:  Current Outpatient Medications   Medication Sig Dispense Refill    hydrOXYzine (ATARAX) 25 MG tablet Take 1 tablet (25 mg) by mouth 3 times daily as needed for itching 90 tablet 2     Allergies   Allergen Reactions    Codeine Palpitations     Tachycardia             [facility] Dermatology Note    Encounter Date: Apr 8, 2024    Dermatology Problem List:  - ***    Major PMHx  -   ______________________________________    Impression/Plan:  Jamari was seen today for derm problem.    Diagnoses and all orders for this visit:    Skin lesion  -     Adult Dermatology Referral    Neoplasm of uncertain behavior  -     Dermatological Path Order and Indications; Standing  -     Dermatological Path Order and  Indications  -     EXC BENIGN SKIN LESION FACE/EARS <=0.5 CM  -     REPAIR INTERMED, WOUND FACE/EARS <=2.5 CM    Rosacea    Multiple benign nevi        {Procedure:840665}    Follow-up in ***.       Staff Involved:  Staff Only    Shorty Lomas MD   of Dermatology  Department of Dermatology  [facility]      CC:   Chief Complaint   Patient presents with    Derm Problem     Lesion on face        History of Present Illness:  Mr. Jamari Cota is a 24 year old male who presents as a *** patient.    ***    Labs:      Physical exam:  Vitals: There were no vitals taken for this visit.  GEN: well developed, well-nourished, in no acute distress, in a pleasant mood.     SKIN: Vera phototype ***  - {Skin Exam:544591}  ***  - No other lesions of concern on areas examined.     Past Medical History:   Past Medical History:   Diagnosis Date    Fatty liver     Otitis media with effusion     chronic    Varicella     about age 3     Past Surgical History:   Procedure Laterality Date    PE TUBES      SURGICAL HISTORY OF -       arm surgery       Social History:   reports that he has never smoked. He has never used smokeless tobacco. He reports that he does not drink alcohol and does not use drugs.    Family History:  Family History   Problem Relation Age of Onset    Hyperlipidemia Father     Diabetes Paternal Grandmother         Type II    Hyperlipidemia Paternal Grandmother     Diabetes Paternal Grandfather         Type II    Hyperlipidemia Paternal Grandfather     Cancer Maternal Uncle         eye cancer    Hypertension No family hx of     Coronary Artery Disease No family hx of     Breast Cancer No family hx of     Colon Cancer No family hx of     Prostate Cancer No family hx of        Medications:  Current Outpatient Medications   Medication Sig Dispense Refill    hydrOXYzine (ATARAX) 25 MG tablet Take 1 tablet (25 mg) by mouth 3 times daily as needed for itching 90 tablet  2     Allergies   Allergen Reactions    Codeine Palpitations     Tachycardia

## 2024-04-08 NOTE — PROGRESS NOTES
DERMATOLOGIC SURGERY REPORT    NAME OF PROCEDURE:  EXCISION AND CLOSURE    Surgeon:  Shorty Lomas MD    PREOPERATIVE DIAGNOSIS: NUB  POSTOPERATIVE DIAGNOSIS: Same  Lesion Size: 4mm lesion with 0mm margins  Final excision size: 4mm  Repair type: Intermediate  FINAL REPAIR LENGTH:  4 mm  Location: Face  Prior Biopsy Accession #: n/a    INDICATIONS:Excision was indicated for treatment. We discussed the principles of treatment and most likely complications including bleeding, infection, wound dehiscence, pain, nerve damage, and scarring. Informed consent was obtained and the patient underwent the procedure as follows.    PROCEDURE:  The patient was taken to the operative suite. The treatment area was anesthetized with 1% lidocaine with 1:[id number] epinephrine buffered with bicarbonate. The area was washed with hibiclens, rinsed with saline and draped with sterile towels. The lesion was delineated and excised down to subcutaneous fat using a 4mm punch tool. Hemostasis was obtained by electrocoagulation.     REPAIR:  In order to repair this defect while maintaining the normal anatomic relations and function, we elected to utilize a linear closure. Closure was oriented so that the wound was in the patient's natural skin tension lines. T Deep dermal and subcutaneous layer closure was performed using 4-0 monocryl deep, intradermal and subcutaneous sutures.  Final cutaneous approximation was achieved with 5-0 fast gut simple interrupted sutures.      A total of 2mL of anesthesia was administered for all surgical sites. Estimated blood loss was less than 5 mL for all surgical sites. A sterile pressure dressing was applied and wound care instructions, with a written handout, were given. The patient was discharged alert and ambulatory.    Shorty Lomas M.D.      Department of Dermatology

## 2024-04-10 LAB
PATH REPORT.COMMENTS IMP SPEC: NORMAL
PATH REPORT.COMMENTS IMP SPEC: NORMAL
PATH REPORT.FINAL DX SPEC: NORMAL
PATH REPORT.GROSS SPEC: NORMAL
PATH REPORT.MICROSCOPIC SPEC OTHER STN: NORMAL
PATH REPORT.RELEVANT HX SPEC: NORMAL

## 2024-04-16 NOTE — PROGRESS NOTES
[facility] Dermatology Note    Encounter Date: Apr 8, 2024    Dermatology Problem List:    ______________________________________    Impression/Plan:  Jamari was seen today for derm problem.    Diagnoses and all orders for this visit:    Skin lesion  -     Adult Dermatology Referral    Neoplasm of uncertain behavior  -     Dermatological Path Order and Indications; Standing  -     Dermatological Path Order and Indications  -     EXC BENIGN SKIN LESION FACE/EARS <=0.5 CM  -     REPAIR INTERMED, WOUND FACE/EARS <=2.5 CM  - discussed risks and benefits of surgery   - risk factors include none  - pt wishes to proceed  - see separate procedure note     Rosacea  - ET type  - declines prescription topicals     Multiple benign nevi  - Reviewed the compounding benefits of incremental changes to sun protective clothing behaviors including increased frequency of sunscreen and sun protective clothing like broad brimmed hats and longsleeved UPF containing clothing          Follow-up PRN.       Staff Involved:  Staff Only    Shorty Lomas MD   of Dermatology  Department of Dermatology  [facility]      CC:   Chief Complaint   Patient presents with    Derm Problem     Lesion on face        History of Present Illness:  Mr. Jamari Cota is a 24 year old male who presents as a new patient.    Patient presents for examination of spots on his face and trunk.  He has 1 particular spot on the face that is bothersome to him and he is not sure what it might be.  He also has some facial redness.    Labs:      Physical exam:  Vitals: There were no vitals taken for this visit.  GEN: well developed, well-nourished, in no acute distress, in a pleasant mood.     SKIN: Vera phototype 1  - Sun-exposed skin, which includes the head/face, neck, both arms, digits, and/or nails was examined.   - erythema and telangiectasia involving forehead, cheeks, and chin   - scattered  brown papules on trunk and extremities   - No other lesions of concern on areas examined.     Past Medical History:   Past Medical History:   Diagnosis Date    Fatty liver     Otitis media with effusion     chronic    Varicella     about age 3     Past Surgical History:   Procedure Laterality Date    PE TUBES      SURGICAL HISTORY OF -       arm surgery       Social History:   reports that he has never smoked. He has never used smokeless tobacco. He reports that he does not drink alcohol and does not use drugs.    Family History:  Family History   Problem Relation Age of Onset    Hyperlipidemia Father     Diabetes Paternal Grandmother         Type II    Hyperlipidemia Paternal Grandmother     Diabetes Paternal Grandfather         Type II    Hyperlipidemia Paternal Grandfather     Cancer Maternal Uncle         eye cancer    Hypertension No family hx of     Coronary Artery Disease No family hx of     Breast Cancer No family hx of     Colon Cancer No family hx of     Prostate Cancer No family hx of        Medications:  Current Outpatient Medications   Medication Sig Dispense Refill    hydrOXYzine (ATARAX) 25 MG tablet Take 1 tablet (25 mg) by mouth 3 times daily as needed for itching 90 tablet 2     Allergies   Allergen Reactions    Codeine Palpitations     Tachycardia

## 2024-06-02 ENCOUNTER — HEALTH MAINTENANCE LETTER (OUTPATIENT)
Age: 24
End: 2024-06-02

## 2024-11-12 NOTE — NURSING NOTE
"Chief Complaint   Patient presents with    RECHECK     Elevated LFT's and fatty liver     Vital signs:  Temp: 98.2  F (36.8  C) Temp src: Oral BP: 123/82 Pulse: 84   Resp: 18 SpO2: 98 %     Height: 182.9 cm (6' 0.01\") Weight: 101.2 kg (223 lb 1.6 oz)  Estimated body mass index is 30.25 kg/m  as calculated from the following:    Height as of this encounter: 1.829 m (6' 0.01\").    Weight as of this encounter: 101.2 kg (223 lb 1.6 oz).      Aleena Wu, [facility]  10/23/2023 11:36 AM    " No

## 2025-01-13 ENCOUNTER — TELEPHONE (OUTPATIENT)
Dept: CARDIOLOGY | Facility: CLINIC | Age: 25
End: 2025-01-13
Payer: COMMERCIAL

## 2025-01-13 NOTE — TELEPHONE ENCOUNTER
1/13 talked with pt, pt would like to cb next week to schedule 2 yr follow-up with Virgil SANDOVAL

## 2025-02-20 ENCOUNTER — TELEPHONE (OUTPATIENT)
Dept: CARDIOLOGY | Facility: CLINIC | Age: 25
End: 2025-02-20
Payer: COMMERCIAL

## 2025-02-20 NOTE — TELEPHONE ENCOUNTER
Left Voicemail (1st Attempt) for the patient to call back and schedule the following:    Appointment type: rtn cardio   Provider: mel li   Return date:  next available   Specialty phone number: [phone] opt 1   Additional appointment(s) needed: n/a   Additonal Notes:  will call back to schedule

## 2025-06-14 ENCOUNTER — HEALTH MAINTENANCE LETTER (OUTPATIENT)
Age: 25
End: 2025-06-14